# Patient Record
Sex: MALE | Race: WHITE | NOT HISPANIC OR LATINO | Employment: OTHER | URBAN - METROPOLITAN AREA
[De-identification: names, ages, dates, MRNs, and addresses within clinical notes are randomized per-mention and may not be internally consistent; named-entity substitution may affect disease eponyms.]

---

## 2018-01-12 ENCOUNTER — OFFICE VISIT - HEALTHEAST (OUTPATIENT)
Dept: FAMILY MEDICINE | Facility: CLINIC | Age: 63
End: 2018-01-12

## 2018-01-12 DIAGNOSIS — R05.9 COUGH: ICD-10-CM

## 2018-01-12 DIAGNOSIS — R09.82 POSTNASAL DRIP: ICD-10-CM

## 2018-07-30 ENCOUNTER — OFFICE VISIT - HEALTHEAST (OUTPATIENT)
Dept: FAMILY MEDICINE | Facility: CLINIC | Age: 63
End: 2018-07-30

## 2018-07-30 ENCOUNTER — RECORDS - HEALTHEAST (OUTPATIENT)
Dept: GENERAL RADIOLOGY | Facility: CLINIC | Age: 63
End: 2018-07-30

## 2018-07-30 DIAGNOSIS — Z13.6 ENCOUNTER FOR ABDOMINAL AORTIC ANEURYSM (AAA) SCREENING: ICD-10-CM

## 2018-07-30 DIAGNOSIS — E88.09 HYPOALBUMINEMIA: ICD-10-CM

## 2018-07-30 DIAGNOSIS — E66.9 OBESITY, UNSPECIFIED: ICD-10-CM

## 2018-07-30 DIAGNOSIS — E66.9 OBESE: ICD-10-CM

## 2018-07-30 DIAGNOSIS — Z11.4 ENCOUNTER FOR SCREENING FOR HIV: ICD-10-CM

## 2018-07-30 DIAGNOSIS — T14.8XXA BLOOD BLISTER: ICD-10-CM

## 2018-07-30 DIAGNOSIS — T14.8XXA OTHER INJURY OF UNSPECIFIED BODY REGION, INITIAL ENCOUNTER: ICD-10-CM

## 2018-07-30 DIAGNOSIS — R21 RASH: ICD-10-CM

## 2018-07-30 DIAGNOSIS — M25.571 PAIN IN JOINT INVOLVING ANKLE AND FOOT, RIGHT: ICD-10-CM

## 2018-07-30 DIAGNOSIS — Z11.59 ENCOUNTER FOR HEPATITIS C SCREENING TEST FOR LOW RISK PATIENT: ICD-10-CM

## 2018-07-30 LAB
ALBUMIN SERPL-MCNC: 3.2 G/DL (ref 3.5–5)
ALP SERPL-CCNC: 105 U/L (ref 45–120)
ALT SERPL W P-5'-P-CCNC: 17 U/L (ref 0–45)
ANION GAP SERPL CALCULATED.3IONS-SCNC: 12 MMOL/L (ref 5–18)
AST SERPL W P-5'-P-CCNC: 16 U/L (ref 0–40)
BASOPHILS # BLD AUTO: 0.1 THOU/UL (ref 0–0.2)
BASOPHILS NFR BLD AUTO: 1 % (ref 0–2)
BILIRUB SERPL-MCNC: 0.6 MG/DL (ref 0–1)
BUN SERPL-MCNC: 17 MG/DL (ref 8–22)
CALCIUM SERPL-MCNC: 8.7 MG/DL (ref 8.5–10.5)
CHLORIDE BLD-SCNC: 105 MMOL/L (ref 98–107)
CHOLEST SERPL-MCNC: 165 MG/DL
CO2 SERPL-SCNC: 21 MMOL/L (ref 22–31)
CREAT SERPL-MCNC: 0.86 MG/DL (ref 0.7–1.3)
EOSINOPHIL # BLD AUTO: 0.2 THOU/UL (ref 0–0.4)
EOSINOPHIL NFR BLD AUTO: 2 % (ref 0–6)
ERYTHROCYTE [DISTWIDTH] IN BLOOD BY AUTOMATED COUNT: 12.3 % (ref 11–14.5)
FASTING STATUS PATIENT QL REPORTED: NO
GFR SERPL CREATININE-BSD FRML MDRD: >60 ML/MIN/1.73M2
GLUCOSE BLD-MCNC: 117 MG/DL (ref 70–125)
HBA1C MFR BLD: 5.8 % (ref 3.5–6)
HCT VFR BLD AUTO: 42.5 % (ref 40–54)
HDLC SERPL-MCNC: 43 MG/DL
HGB BLD-MCNC: 14.2 G/DL (ref 14–18)
HIV 1+2 AB+HIV1 P24 AG SERPL QL IA: NEGATIVE
LDLC SERPL CALC-MCNC: 94 MG/DL
LYMPHOCYTES # BLD AUTO: 1.6 THOU/UL (ref 0.8–4.4)
LYMPHOCYTES NFR BLD AUTO: 19 % (ref 20–40)
MCH RBC QN AUTO: 29.7 PG (ref 27–34)
MCHC RBC AUTO-ENTMCNC: 33.4 G/DL (ref 32–36)
MCV RBC AUTO: 89 FL (ref 80–100)
MONOCYTES # BLD AUTO: 0.8 THOU/UL (ref 0–0.9)
MONOCYTES NFR BLD AUTO: 9 % (ref 2–10)
NEUTROPHILS # BLD AUTO: 5.6 THOU/UL (ref 2–7.7)
NEUTROPHILS NFR BLD AUTO: 68 % (ref 50–70)
PLATELET # BLD AUTO: 264 THOU/UL (ref 140–440)
PMV BLD AUTO: 6.8 FL (ref 7–10)
POTASSIUM BLD-SCNC: 4 MMOL/L (ref 3.5–5)
PROT SERPL-MCNC: 7.2 G/DL (ref 6–8)
RBC # BLD AUTO: 4.77 MILL/UL (ref 4.4–6.2)
SODIUM SERPL-SCNC: 138 MMOL/L (ref 136–145)
TRIGL SERPL-MCNC: 142 MG/DL
WBC: 8.2 THOU/UL (ref 4–11)

## 2018-07-30 ASSESSMENT — MIFFLIN-ST. JEOR: SCORE: 1641.49

## 2018-07-31 LAB
25(OH)D3 SERPL-MCNC: 25.7 NG/ML (ref 30–80)
25(OH)D3 SERPL-MCNC: 25.7 NG/ML (ref 30–80)
HCV AB SERPL QL IA: NEGATIVE

## 2018-08-03 ENCOUNTER — HOSPITAL ENCOUNTER (OUTPATIENT)
Dept: ULTRASOUND IMAGING | Facility: HOSPITAL | Age: 63
Discharge: HOME | End: 2018-08-03

## 2018-08-03 DIAGNOSIS — Z13.6 ENCOUNTER FOR ABDOMINAL AORTIC ANEURYSM (AAA) SCREENING: ICD-10-CM

## 2018-08-03 DIAGNOSIS — E66.9 OBESE: ICD-10-CM

## 2019-01-01 ENCOUNTER — AMBULATORY - HEALTHEAST (OUTPATIENT)
Dept: MULTI SPECIALTY CLINIC | Facility: CLINIC | Age: 64
End: 2019-01-01

## 2019-01-04 ENCOUNTER — RECORDS - HEALTHEAST (OUTPATIENT)
Dept: ADMINISTRATIVE | Facility: OTHER | Age: 64
End: 2019-01-04

## 2019-06-11 ENCOUNTER — COMMUNICATION - HEALTHEAST (OUTPATIENT)
Dept: FAMILY MEDICINE | Facility: CLINIC | Age: 64
End: 2019-06-11

## 2019-08-09 ENCOUNTER — OFFICE VISIT - HEALTHEAST (OUTPATIENT)
Dept: FAMILY MEDICINE | Facility: CLINIC | Age: 64
End: 2019-08-09

## 2019-08-09 DIAGNOSIS — L72.3 INFLAMED SEBACEOUS CYST: ICD-10-CM

## 2019-08-09 DIAGNOSIS — L73.9 FOLLICULITIS: ICD-10-CM

## 2019-08-09 ASSESSMENT — MIFFLIN-ST. JEOR: SCORE: 1606.62

## 2019-11-11 ENCOUNTER — COMMUNICATION - HEALTHEAST (OUTPATIENT)
Dept: FAMILY MEDICINE | Facility: CLINIC | Age: 64
End: 2019-11-11

## 2019-12-19 ENCOUNTER — AMBULATORY - HEALTHEAST (OUTPATIENT)
Dept: NURSING | Facility: CLINIC | Age: 64
End: 2019-12-19

## 2019-12-19 DIAGNOSIS — Z23 FLU VACCINE NEED: ICD-10-CM

## 2020-01-21 ENCOUNTER — OFFICE VISIT - HEALTHEAST (OUTPATIENT)
Dept: FAMILY MEDICINE | Facility: CLINIC | Age: 65
End: 2020-01-21

## 2020-01-21 DIAGNOSIS — H54.61 DECREASED VISION OF RIGHT EYE: ICD-10-CM

## 2020-01-21 DIAGNOSIS — Z01.818 PREOPERATIVE EXAMINATION: ICD-10-CM

## 2020-01-21 ASSESSMENT — MIFFLIN-ST. JEOR: SCORE: 1619.95

## 2020-02-04 ENCOUNTER — COMMUNICATION - HEALTHEAST (OUTPATIENT)
Dept: FAMILY MEDICINE | Facility: CLINIC | Age: 65
End: 2020-02-04

## 2020-12-02 ENCOUNTER — COMMUNICATION - HEALTHEAST (OUTPATIENT)
Dept: FAMILY MEDICINE | Facility: CLINIC | Age: 65
End: 2020-12-02

## 2020-12-02 ENCOUNTER — AMBULATORY - HEALTHEAST (OUTPATIENT)
Dept: NURSING | Facility: CLINIC | Age: 65
End: 2020-12-02

## 2020-12-02 DIAGNOSIS — Z23 NEED FOR VACCINATION: ICD-10-CM

## 2021-03-15 ENCOUNTER — COMMUNICATION - HEALTHEAST (OUTPATIENT)
Dept: FAMILY MEDICINE | Facility: CLINIC | Age: 66
End: 2021-03-15

## 2021-05-31 VITALS — BODY MASS INDEX: 32.43 KG/M2 | WEIGHT: 204 LBS

## 2021-05-31 NOTE — PROGRESS NOTES
"[facility] Note    Name: Se Quinonez  : 1955   MRN: [id number]    Se Quinonez is a 64 y.o. male presenting to discuss the following:     CC:   Chief Complaint   Patient presents with     Cyst     Upper back, left shoulder blade     Urticaria     Waist and legs       HPI:  CYST - wife noticed 2 weeks ago. Wife has been squeezing it and applying triple antibiotic ointment. Not painful. Initially had some drainage, white cheesy drainage. Now not getting much out, has decreased in size. Was initially a quarter in diameter and 1/4\" tall.     HIVES - Had an outdoor tabling evening at [facility] 1 week ago. Noticed a few bumps later in the day, looked hive-like. Remote history of hives and looked similar. Grass had recently been mowed. Not sure if insect bites. No longer itchy. Las 5-6 days, has been using topical benadryl. Stayed the same, no migration. Seem to be shrinking in the last day or so.     No symptoms of swelling, shortness of breath, lightheadedness or dizziness.     Had colonoscopy in January, diagnosed with mild case of Crohn's, prescribed budesonide, treatment was going to be $2000 annually. Inflammatory bowel disease runs in family. Since then, hasn't had any symptoms or urges. Was told he had hemorrhoids as well. Hasn't followed up with GI.     ROS:   See HPI above.     PMH:   Patient Active Problem List   Diagnosis     Postherpetic Polyneuropathy     Herpes Zoster (Shingles)     Red Blood In Bowel Movement (Hematochezia)     PSH:   Past Surgical History:   Procedure Laterality Date     EYE SURGERY       tonsillectomy       MEDICATIONS:   Current Outpatient Medications on File Prior to Visit   Medication Sig Dispense Refill     [DISCONTINUED] ibuprofen (ADVIL,MOTRIN) 200 MG tablet Take 200 mg by mouth every 6 (six) hours as needed for pain.       No current facility-administered medications on file prior to visit.        ALLERGIES:  No Known Allergies    PHYSICAL EXAM: " "  /74   Pulse 100   Temp 98.8  F (37.1  C) (Oral)   Resp 16   Ht 5' 6.5\" (1.689 m)   Wt 195 lb 5 oz (88.6 kg)   BMI 31.05 kg/m     GENERAL: Se is a pleasant, well appearing male, overweight, in no acute distress.   HEART: Regular rate and rhythm, no murmurs.   LUNGS: Clear to auscultation bilaterally, unlabored.   DERM: Inflamed cyst draining cheesy white, purulent fluid overlying left scapula. Utilized q-tip to help express remaining contents. Cyst flattened, approximately the size of a nickel. On bilateral upper legs, he has non-blanchable dark erythematous papules and pustules    ASSESSMENT & PLAN:   Se Quinonez is a 64 y.o. male presenting today for evaluation of skin concerns.     1. Inflamed sebaceous cyst  - sulfamethoxazole-trimethoprim (BACTRIM DS) 800-160 mg per tablet; Take 1 tablet by mouth 2 (two) times a day for 7 days.  Dispense: 14 tablet; Refill: 0    Cyst currently inflamed, has already been drained by home remedies. Recommended antibiotics to help calm down inflammation and treat secondary bacterial infection. Discussed options of keflex vs clindamycin vs bactrim vs doxycycline, opted to cover with Bactrim.     Recommended allowing cyst to re-accumulate and when bothersome again, then return for excision of cyst sac to prevent recurrence.     2. Folliculitis  Leg legions are not consistent with hives, more consistent with folliculitis vs bug bites. May continue topical and/or oral antihistamines for itching. Anticipate antibiotics per above will treat lesions. If not improving, return for further evaluation and consider possible vasculitis in differential diagnosis.     HM: JAMI signed for colonoscopy report from [facility].     RTC: 1 month - medicare annual wellness visit    Anastasiya Abdul, DO       "

## 2021-06-01 VITALS — WEIGHT: 203 LBS | HEIGHT: 67 IN | BODY MASS INDEX: 31.86 KG/M2

## 2021-06-03 VITALS — BODY MASS INDEX: 30.65 KG/M2 | HEIGHT: 67 IN | WEIGHT: 195.31 LBS

## 2021-06-04 VITALS
DIASTOLIC BLOOD PRESSURE: 78 MMHG | SYSTOLIC BLOOD PRESSURE: 122 MMHG | HEIGHT: 67 IN | HEART RATE: 72 BPM | BODY MASS INDEX: 31.11 KG/M2 | RESPIRATION RATE: 16 BRPM | TEMPERATURE: 97.9 F | WEIGHT: 198.25 LBS

## 2021-06-05 NOTE — PROGRESS NOTES
Preoperative Exam    Scheduled Procedure: R Eye Yag Capsulotomy  Surgery Date:  1/23/20  Surgery Location: [facility], [place], fax [phone]    Surgeon:  Dr. Denton Mcnally    Assessment/Plan:     1. Preoperative examination    2. Decreased vision of right eye     Surgical Procedure Risk: Low (reported cardiac risk generally < 1%)  Have you had prior anesthesia?: Yes  Have you or any family members had a previous anesthesia reaction:  No  Do you or any family members have a history of a clotting or bleeding disorder?: No  Cardiac Risk Assessment: no increased risk for major cardiac complications    APPROVAL GIVEN to proceed with proposed procedure, without further diagnostic evaluation    Functional Status: Independent  Patient plans to recover at home with family.     Subjective:      Se Quinonez is a 64 y.o. male who presents for a preoperative consultation.  He has unfortunately been having significant issues with his right eye.  He will be having a yag capsulotomy procedure done later this week.    He has a past medical history significant for Crohn's disease which is untreated with medication.  He states that he is overall asymptomatic.    All other systems reviewed and are negative, other than those listed in the HPI.    Pertinent History  Do you have difficulty breathing or chest pain after walking up a flight of stairs: No  History of obstructive sleep apnea: No  Steroid use in the last 6 months: No  Frequent Aspirin/NSAID use: No  Prior Blood Transfusion: No  Prior Blood Transfusion Reaction: No  If for some reason prior to, during or after the procedure, if it is medically indicated, would you be willing to have a blood transfusion?:  There is no transfusion refusal.    No current outpatient medications on file.     No current facility-administered medications for this visit.         No Known Allergies    Patient Active Problem List   Diagnosis     Red Blood In Bowel Movement  (Hematochezia)       Past Medical History:   Diagnosis Date     Herpes Zoster (Shingles)     Created by Conversion  Replacement Utility updated for latest IMO load     Postherpetic polyneuropathy     Created by Conversion        Past Surgical History:   Procedure Laterality Date     EYE SURGERY       tonsillectomy         Social History     Socioeconomic History     Marital status:      Spouse name: Not on file     Number of children: Not on file     Years of education: Not on file     Highest education level: Not on file   Occupational History     Not on file   Social Needs     Financial resource strain: Not on file     Food insecurity:     Worry: Not on file     Inability: Not on file     Transportation needs:     Medical: Not on file     Non-medical: Not on file   Tobacco Use     Smoking status: Former Smoker     Last attempt to quit: 11/1/2004     Years since quitting: 15.2     Smokeless tobacco: Never Used   Substance and Sexual Activity     Alcohol use: No     Drug use: No     Sexual activity: Not on file   Lifestyle     Physical activity:     Days per week: Not on file     Minutes per session: Not on file     Stress: Not on file   Relationships     Social connections:     Talks on phone: Not on file     Gets together: Not on file     Attends [employer] service: Not on file     Active member of club or organization: Not on file     Attends meetings of clubs or organizations: Not on file     Relationship status: Not on file     Intimate partner violence:     Fear of current or ex partner: Not on file     Emotionally abused: Not on file     Physically abused: Not on file     Forced sexual activity: Not on file   Other Topics Concern     Not on file   Social History Narrative     Not on file       Patient Care Team:  Mikayla Bill MD as PCP - General (Family Medicine)  Anastasiya Abdul DO as Assigned PCP          Objective:     Vitals:    01/21/20 0953   BP: 122/78   Pulse: 72   Resp: 16   Temp:  "97.9  F (36.6  C)   TempSrc: Oral   Weight: 198 lb 4 oz (89.9 kg)   Height: 5' 6.5\" (1.689 m)         Physical Exam:  Objective:  Vital signs reviewed and stable  General: No acute distress  Psych: Appropriate affect  HEENT: moist mucous membranes, pupils equal, round, reactive to light and accomodation, posterior oropharynx clear of erythema or exudate, tympanic membranes are pearly grey bilaterally  Lymph: no cervical or supraclavicular lymphadenopathy  Cardiovascular: regular rate and rhythm with no murmur  Pulmonary: clear to auscultation bilaterally with no wheeze  Abdomen: soft, non tender, non distended with normo-active bowel sounds  Extremities: warm and well perfused with no edema  Skin: warm and dry with no rash      There are no Patient Instructions on file for this visit.      Labs:  No labs were ordered during this visit    Immunization History   Administered Date(s) Administered     Influenza high dose,seasonal,PF, 65+ yrs 12/19/2019     Influenza, inj, historic,unspecified 12/14/2005, 11/01/2013, 09/11/2015     Pneumo Conj 13-V (2010&after) 07/30/2018     Tdap 07/30/2018           Electronically signed by Mikayla Bill MD 01/21/20 9:55 AM  "

## 2021-06-05 NOTE — TELEPHONE ENCOUNTER
Left message for pt to call back.  Dr. Bill wanted pt to know she received a copy of colonoscopy from 1/2019. Recommendations from [facility] were to repeat colonoscopy in 1 year.  Please give pt phone number for [facility] ([phone]) to call and schedule colonoscopy.  Please document call was returned.

## 2021-06-05 NOTE — TELEPHONE ENCOUNTER
Patient Returning Call  Reason for call:  Return call   Information relayed to patient:  Caller was informed of message below.   Patient has additional questions:  Yes  If YES, what are your questions/concerns:  Caller stated that he already knows and does not need a reminder call. Caller stated that he is not ready at the moment and will call when he is.   Okay to leave a detailed message?: No call back needed

## 2021-06-15 NOTE — PROGRESS NOTES
ASSESSMENT/ PLAN    1. Cough  2. Postnasal drip  62 year old male here for 4-5 day history of cough, cold symptoms, postnasal drip. He's feeling better today but still came in to make sure everything is ok. Vital signs normal. Cardiopulmonary exam normal. HEENT exam normal except mildly erythematous posterior oropharynx. Most likely viral bronchitis, and cough due to ongoing postnasal drip. Will treat symptomatically with flonase for postnasal drip. No antibiotic indication. No lab work or imaging indicated. RTC in a few days if not improved.   - fluticasone (FLONASE) 50 mcg/actuation nasal spray; 1 spray into each nostril daily.  Dispense: 16 g; Refill: 2      Rosangela Morfin MD        SUBJECTIVE   Se Quinonez is a 62 y.o. old male with a past medical history of obesity who presented to clinic today for further evaluation of cough and cold symptoms. Started about 4-5 days ago. Cough is getting worse with chest pain from coughing, pain on the bilateral frontal lower ribs, postnasal drip, dry cough. Associated with fatigue. Reports chills and subjective fever. Headache. Denies dizziness. Reports fatigue. Reports mild sinus pain. Denies ear pain. Reports mild nausea with coughing. Reports mild SOB. Denies chest pain with exertion. Denies stool problem or urinary problem. Nasal congestion and fatigue.     Review of Systems:  A 12 point comprehensive review of systems was negative except as noted in HPI.    The following portions of the patient's history were reviewed and updated as appropriate: past medical history, past surgical history, family history, allergies, current medications and problem list.    Medical History  Active Ambulatory (Non-Hospital) Problems    Diagnosis     Postherpetic Polyneuropathy     Herpes Zoster (Shingles)     Red Blood In Bowel Movement (Hematochezia)     History reviewed. No pertinent past medical history.    Surgical History  He  has a past surgical history that includes  tonsillectomy and Eye surgery.    Social History  Reviewed, and he  reports that he quit smoking about 13 years ago. He has never used smokeless tobacco. He reports that he does not drink alcohol or use illicit drugs.    Family History  Reviewed, and family history includes Alzheimer's disease in his mother; Cancer in his father.    Medications  Reviewed and reconciled.      Allergies  No Known Allergies      OBJECTIVE  Physical Exam:  Vital signs: /86 (Patient Site: Left Arm, Patient Position: Sitting, Cuff Size: Adult Regular)  Pulse (!) 104  Temp 98.5  F (36.9  C) (Oral)   Resp 20  Wt 204 lb (92.5 kg)  SpO2 94%  BMI 32.43 kg/m2  Weight: 204 lb (92.5 kg)    General appearance: pleasant, appears stated age, cooperative and in no distress  Eyes: EOMs intact, PERRL, conjunctivae normal.   ENT: moist oral mucosa, posterior oropharynx mildly, TMs normal. No sinus tenderness on palpation.   Lymph: no cervical/supraclavicular adenopathy  Respiratory: clear to auscultation bilaterally, good air movement throughout, no wheezing or crackles, speaking full sentences without difficulty  Cardiovascular: regular rate and rhythm, no murmur appreciated, no leg edema  Skin: no rashes  Neuro: alert oriented x 3, grossly normal otherwise  Psych: normal affect, appropriate conversation

## 2021-06-19 NOTE — LETTER
Letter by Mikayla Bill MD at      Author: Mikayla Bill MD Service: -- Author Type: --    Filed:  Encounter Date: 6/11/2019 Status: (Other)       Se Quinonez  [address] [facility] [address]             June 11, 2019        Dear Se Quinonez :    Dr. Bill was reviewing your chart and noticed that you are due for a colonoscopy. Your last discussion pertaining to a colonoscopy was 7/30/2018 with Dr. Maikol Loving.    To prevent delays in your care, please call [facility] [phone] to schedule a screening colonoscopy.    If you had a colonoscopy performed within the last 10 years at a different facility please contact the [facility] at [phone]   so we can get the report.    Sincerely,  Your care team at [facility]

## 2021-06-19 NOTE — PROGRESS NOTES
"[facility] Note    Patient Name: Se Quinonez  Patient Age: 63 y.o.  YOB: 1955  MRN: [id number]    Date of visit: 7/30/2018    Patient Active Problem List   Diagnosis     Postherpetic Polyneuropathy     Herpes Zoster (Shingles)     Red Blood In Bowel Movement (Hematochezia)     Social History     Social History Narrative     Family History   Problem Relation Age of Onset     Alzheimer's disease Mother      Cancer Father      blood cancer     Outpatient Encounter Prescriptions as of 7/30/2018   Medication Sig Dispense Refill     ibuprofen (ADVIL,MOTRIN) 200 MG tablet Take 200 mg by mouth every 6 (six) hours as needed for pain.       terbinafine HCl (LAMISIL) 1 % cream Apply topically 2 (two) times a day for 14 days. 30 g 0     triamcinolone (KENALOG) 0.5 % ointment Apply topically 2 (two) times a day for 14 days. If using longer than 2 weeks, only use 5 days/week. 15 g 0     [DISCONTINUED] fluticasone (FLONASE) 50 mcg/actuation nasal spray 1 spray into each nostril daily. 16 g 2     No facility-administered encounter medications on file as of 7/30/2018.        Chief Complaint:   Chief Complaint   Patient presents with     Rash     x 1 month. Itchy       /74  Pulse 96  Temp 98.3  F (36.8  C) (Oral)   Resp 20  Ht 5' 6.5\" (1.689 m)  Wt 203 lb (92.1 kg)  BMI 32.27 kg/m2  HPI:   Patient has had a rectangular rash on the right medial ankle for the last month or so, it is increased somewhat in size, it is pruritic.  He has not been using any medication on it, no new medications, no tape has been used on it.  He has had swelling of that ankle for the last year and some pain when he is going down steps over that time no distal symptoms.  He did notice a hemorrhagic blister on his fourth toe over the last week.    He has not been in for a wellness visit in a very long time, he is interested in having some preventative things done here today.  He had a sigmoidoscopy about 30 years ago.  He " "did smoke until 2004.    ROS: Pertinent ros findings in hpi, all other systems negative.    Objective/Physical Exam:     /74  Pulse 96  Temp 98.3  F (36.8  C) (Oral)   Resp 20  Ht 5' 6.5\" (1.689 m)  Wt 203 lb (92.1 kg)  BMI 32.27 kg/m2    Gen: NAD, appears age  Skin: warm, dry  HENT: normocephalic atraumatic, MMM  Eyes: non-icteric, no proptosis  CV: NRRR no m/r/g, no peripheral edema  Resp: CTAB no w/r/r, normal respiratory effort  Abd: non-distended, soft  Hematologic: No petechiae or purpura  MSK: no muscle or joint swelling  Neuro: no dysarthria or gross asymmetry  Psych: Cooperative, full affect    Right medial ankle there is a 1 x 3 cm patch with some crusting over it which is patchy.  There is no surrounding erythema, no induration.  There is very mild swelling of the ankle on the right, dorsalis pedis 1+ no distal cyanosis or pallor.  There is a hemorrhagic blister on the plantar surface of the fourth toe which has started to become a callus.  There is no pain or restriction with range of motion of the right ankle, 5 out of 5 strength of the ankle and great toe.  Absolutely no swelling of the right calf.    Assessment/Plan:  No results found for this or any previous visit (from the past 24 hour(s)).      ICD-10-CM    1. Rash R21 Ambulatory referral to Dermatology   2. Pain in joint involving ankle and foot, right M25.571    3. Blood blister T14.8XXA XR Ankle Right 3 or More VWS   4. Obese E66.9 Lipid Cascade     Glycosylated Hemoglobin A1c     Comprehensive Metabolic Panel     Vitamin D, Total (25-Hydroxy)     HIV Antigen/Antibody Screening [place]     Hepatitis C Antibody (Anti-HCV)     Ambulatory referral for Colonoscopy     US Abdominal Aorta     XR Ankle Right 3 or More VWS   5. Encounter for abdominal aortic aneurysm (AAA) screening Z13.6 US Abdominal Aorta   6. Encounter for hepatitis C screening test for low risk patient Z11.59 Hepatitis C Antibody (Anti-HCV)   7. Encounter for screening " for HIV Z11.4 HIV Antigen/Antibody Screening [place]       I am certain uncertain what the cause of this rash is on the right medial ankle, it may be fungal versus contact dermatitis versus other.  Given that it is worsening, I am recommend that he see a dermatologist, it may need a biopsy.  In the meantime, I do recommend trying topical antifungal for 4 weeks and topical steroid for 1-2 weeks initially.    We are doing a Prevnar and his Tdap today, he wanted to wait on his shin Grix.  Blood tests as above, AAA screen and colonoscopy risks benefits discussed.  I did advise him that he should schedule a dedicated preventative visit in the next couple months.  We talked extensively about diet and exercise.    He is not having pain in his right ankle except when he is going downstairs for the most part, we are x-raying his ankle, we did discuss that weight loss would probably help with this.  We will see what x-ray shows.    Patient Instructions   Plant Based Diet Handout    Eat abundant vegetables.    Only eat whole grains.    2-4 fruits/day.    Enjoy at least 2 servings of legumes (beans) or tofu daily.      Avoid animal products such as dairy, eggs and meat. (Replace these with 1,000mcg vitamin B12 and a calcium/vitamin D supplement such as Caltrate+D3 daily.)    Avoid processed foods, sugars and oils.    Cook your own food as much as possible.    Keep a food diary and ask someone else to diet with you.    Drink 8 glasses of water a day.    [employer] Documentary - watch online.        Counseled patient regarding treatments, treatment options, risks and benefits and diagnosis.  The patient was interactive, attentive, verbalized understanding, and we discussed plan.     Maikol Loving MD

## 2021-06-26 ENCOUNTER — HEALTH MAINTENANCE LETTER (OUTPATIENT)
Age: 66
End: 2021-06-26

## 2021-07-03 NOTE — ADDENDUM NOTE
Addendum Note by Suni Loving MD at 7/31/2018 10:22 AM     Author: Suni Loving MD Service: -- Author Type: Physician    Filed: 7/31/2018 10:22 AM Encounter Date: 7/30/2018 Status: Signed    : Suni Loving MD (Physician)    Addended by: SUNI LOVING on: 7/31/2018 10:22 AM        Modules accepted: Orders

## 2021-10-16 ENCOUNTER — HEALTH MAINTENANCE LETTER (OUTPATIENT)
Age: 66
End: 2021-10-16

## 2021-12-13 ENCOUNTER — IMMUNIZATION (OUTPATIENT)
Dept: FAMILY MEDICINE | Facility: CLINIC | Age: 66
End: 2021-12-13
Payer: MEDICARE

## 2021-12-13 PROCEDURE — 91306 COVID-19,PF,MODERNA (18+ YRS BOOSTER .25ML): CPT

## 2021-12-13 PROCEDURE — 0064A COVID-19,PF,MODERNA (18+ YRS BOOSTER .25ML): CPT

## 2022-07-08 ENCOUNTER — OFFICE VISIT (OUTPATIENT)
Dept: FAMILY MEDICINE | Facility: CLINIC | Age: 67
End: 2022-07-08
Payer: MEDICARE

## 2022-07-08 VITALS
RESPIRATION RATE: 16 BRPM | OXYGEN SATURATION: 97 % | DIASTOLIC BLOOD PRESSURE: 78 MMHG | TEMPERATURE: 97.8 F | SYSTOLIC BLOOD PRESSURE: 130 MMHG | HEART RATE: 80 BPM | BODY MASS INDEX: 31.24 KG/M2 | HEIGHT: 66 IN | WEIGHT: 194.38 LBS

## 2022-07-08 DIAGNOSIS — Z13.1 SCREENING FOR DIABETES MELLITUS: ICD-10-CM

## 2022-07-08 DIAGNOSIS — Z12.5 SCREENING FOR PROSTATE CANCER: ICD-10-CM

## 2022-07-08 DIAGNOSIS — Z00.00 ENCOUNTER FOR MEDICARE ANNUAL WELLNESS EXAM: Primary | ICD-10-CM

## 2022-07-08 DIAGNOSIS — Z13.220 LIPID SCREENING: ICD-10-CM

## 2022-07-08 DIAGNOSIS — H91.93 HEARING DEFICIT, BILATERAL: ICD-10-CM

## 2022-07-08 DIAGNOSIS — R97.20 ELEVATED PROSTATE SPECIFIC ANTIGEN (PSA): ICD-10-CM

## 2022-07-08 DIAGNOSIS — R73.09 OTHER ABNORMAL GLUCOSE: ICD-10-CM

## 2022-07-08 DIAGNOSIS — K50.919 CROHN'S DISEASE WITH COMPLICATION, UNSPECIFIED GASTROINTESTINAL TRACT LOCATION (H): ICD-10-CM

## 2022-07-08 LAB
ALBUMIN SERPL-MCNC: 3.2 G/DL (ref 3.4–5)
ALP SERPL-CCNC: 101 U/L (ref 40–150)
ALT SERPL W P-5'-P-CCNC: 24 U/L (ref 0–70)
ANION GAP SERPL CALCULATED.3IONS-SCNC: 5 MMOL/L (ref 3–14)
AST SERPL W P-5'-P-CCNC: 21 U/L (ref 0–45)
BILIRUB SERPL-MCNC: 0.9 MG/DL (ref 0.2–1.3)
BUN SERPL-MCNC: 15 MG/DL (ref 7–30)
CALCIUM SERPL-MCNC: 8.3 MG/DL (ref 8.5–10.1)
CHLORIDE BLD-SCNC: 108 MMOL/L (ref 94–109)
CHOLEST SERPL-MCNC: 149 MG/DL
CO2 SERPL-SCNC: 25 MMOL/L (ref 20–32)
CREAT SERPL-MCNC: 0.84 MG/DL (ref 0.66–1.25)
FASTING STATUS PATIENT QL REPORTED: NORMAL
GFR SERPL CREATININE-BSD FRML MDRD: >90 ML/MIN/1.73M2
GLUCOSE BLD-MCNC: 90 MG/DL (ref 70–99)
HBA1C MFR BLD: 5.3 % (ref 0–5.6)
HDLC SERPL-MCNC: 43 MG/DL
LDLC SERPL CALC-MCNC: 85 MG/DL
NONHDLC SERPL-MCNC: 106 MG/DL
POTASSIUM BLD-SCNC: 4.5 MMOL/L (ref 3.4–5.3)
PROT SERPL-MCNC: 7.5 G/DL (ref 6.8–8.8)
PSA SERPL-MCNC: 28.3 UG/L (ref 0–4)
SODIUM SERPL-SCNC: 138 MMOL/L (ref 133–144)
TRIGL SERPL-MCNC: 106 MG/DL

## 2022-07-08 PROCEDURE — 36415 COLL VENOUS BLD VENIPUNCTURE: CPT | Performed by: FAMILY MEDICINE

## 2022-07-08 PROCEDURE — 83036 HEMOGLOBIN GLYCOSYLATED A1C: CPT | Performed by: FAMILY MEDICINE

## 2022-07-08 PROCEDURE — G0439 PPPS, SUBSEQ VISIT: HCPCS | Performed by: FAMILY MEDICINE

## 2022-07-08 PROCEDURE — 80061 LIPID PANEL: CPT | Performed by: FAMILY MEDICINE

## 2022-07-08 PROCEDURE — 80053 COMPREHEN METABOLIC PANEL: CPT | Performed by: FAMILY MEDICINE

## 2022-07-08 PROCEDURE — G0103 PSA SCREENING: HCPCS | Performed by: FAMILY MEDICINE

## 2022-07-08 ASSESSMENT — ENCOUNTER SYMPTOMS
EYE PAIN: 0
HEARTBURN: 1
SORE THROAT: 0
SHORTNESS OF BREATH: 0
PALPITATIONS: 0
HEADACHES: 0
PARESTHESIAS: 0
CONSTIPATION: 0
WEAKNESS: 0
HEMATURIA: 0
DIZZINESS: 0
DYSURIA: 0
DIARRHEA: 0
HEMATOCHEZIA: 0
MYALGIAS: 0
ABDOMINAL PAIN: 0
FEVER: 0
JOINT SWELLING: 0
COUGH: 0
NERVOUS/ANXIOUS: 0
FREQUENCY: 1
ARTHRALGIAS: 1
CHILLS: 0

## 2022-07-08 ASSESSMENT — ACTIVITIES OF DAILY LIVING (ADL): CURRENT_FUNCTION: NO ASSISTANCE NEEDED

## 2022-07-08 NOTE — PATIENT INSTRUCTIONS
Patient Education   Personalized Prevention Plan  You are due for the preventive services outlined below.  Your care team is available to assist you in scheduling these services.  If you have already completed any of these items, please share that information with your care team to update in your medical record.  Health Maintenance Due   Topic Date Due     ANNUAL REVIEW OF HM ORDERS  Never done     Discuss Advance Care Planning  Never done     Zoster (Shingles) Vaccine (1 of 2) Never done     COVID-19 Vaccine (4 - Booster for Moderna series) 04/13/2022

## 2022-07-08 NOTE — PROGRESS NOTES
"SUBJECTIVE:   Se Quinonez is a 67 year old male who presents for Preventive Visit.    Patient has been advised of split billing requirements and indicates understanding: Yes  Are you in the first 12 months of your Medicare coverage?  No    Healthy Habits:     In general, how would you rate your overall health?  Good    Frequency of exercise:  4-5 days/week    Duration of exercise:  15-30 minutes    Do you usually eat at least 4 servings of fruit and vegetables a day, include whole grains    & fiber and avoid regularly eating high fat or \"junk\" foods?  No    Taking medications regularly:  Yes    Medication side effects:  None    Ability to successfully perform activities of daily living:  No assistance needed    Home Safety:  Lack of grab bars in the bathroom    Hearing Impairment:  Difficult to understand a speaker at a public meeting or [employer] service, need to ask people to speak up or repeat themselves, find that men's voices are easier to understand than woman's, difficulty understanding soft or whispered speech and difficulty understanding speech on the telephone    In the past 6 months, have you been bothered by leaking of urine? Yes    In general, how would you rate your overall mental or emotional health?  Good      PHQ-2 Total Score: 0    Additional concerns today:  No    Do you feel safe in your environment? Yes    Have you ever done Advance Care Planning? (For example, a Health Directive, POLST, or a discussion with a medical provider or your loved ones about your wishes): Not on file with the clinic    Fall risk  Fallen 2 or more times in the past year?: No  Any fall with injury in the past year?: No    Cognitive Screening   1) Repeat 3 items (Leader, Season, Table)    2) Clock draw: NORMAL  3) 3 item recall: Recalls 2 objects   Results: NORMAL clock, 1-2 items recalled: COGNITIVE IMPAIRMENT LESS LIKELY    Mini-CogTM Copyright FERNANDO Guevara. Licensed by the author for use in [facility]; " reprinted with permission (soob@.[facility]). All rights reserved.      Do you have sleep apnea, excessive snoring or daytime drowsiness?: no    Reviewed and updated as needed this visit by clinical staff   Tobacco  Allergies  Meds                Reviewed and updated as needed this visit by Provider                   Social History     Tobacco Use     Smoking status: Former Smoker     Quit date: 2004     Years since quittin.7     Smokeless tobacco: Never Used   Substance Use Topics     Alcohol use: No     If you drink alcohol do you typically have >3 drinks per day or >7 drinks per week? No    Alcohol Use 2022   Prescreen: >3 drinks/day or >7 drinks/week? Not Applicable     Patient notes that he gets up several times at night to urinate.  He does drink 2 cups of coffee before bed.  He has not tried decreasing his fluid intake.  He does not have issues urinating a full bladder or issues starting or stopping stream.  There is a family history of prostate cancer.  Patient has never had a physical and thus never had PSA testing done.  He has not had issues with urination throughout the day.      PROBLEMS TO ADD ON...    Current providers sharing in care for this patient include:   Patient Care Team:  Mikayla Bill MD as PCP - General (Family Practice)  Mikayla Bill MD as Assigned PCP    The following health maintenance items are reviewed in Epic and correct as of today:  Health Maintenance Due   Topic Date Due     ADVANCE CARE PLANNING  Never done     ZOSTER IMMUNIZATION (1 of 2) Never done     COVID-19 Vaccine (4 - Booster for Moderna series) 2022     Labs reviewed in EPIC    Review of Systems   Constitutional: Negative for chills and fever.   HENT: Positive for hearing loss. Negative for congestion, ear pain and sore throat.    Eyes: Negative for pain and visual disturbance.   Respiratory: Negative for cough and shortness of breath.    Cardiovascular: Negative for chest pain,  "palpitations and peripheral edema.   Gastrointestinal: Positive for heartburn. Negative for abdominal pain, constipation, diarrhea and hematochezia.   Genitourinary: Positive for frequency, impotence and urgency. Negative for dysuria, genital sores, hematuria and penile discharge.   Musculoskeletal: Positive for arthralgias. Negative for joint swelling and myalgias.   Skin: Negative for rash.   Neurological: Negative for dizziness, weakness, headaches and paresthesias.   Psychiatric/Behavioral: Negative for mood changes. The patient is not nervous/anxious.          OBJECTIVE:   /78   Pulse 80   Temp 97.8  F (36.6  C) (Tympanic)   Resp 16   Ht 1.676 m (5' 6\")   Wt 88.2 kg (194 lb 6 oz)   SpO2 97%   BMI 31.37 kg/m   Estimated body mass index is 31.37 kg/m  as calculated from the following:    Height as of this encounter: 1.676 m (5' 6\").    Weight as of this encounter: 88.2 kg (194 lb 6 oz).  Physical Exam  GENERAL: healthy, alert and no distress  EYES: Eyes grossly normal to inspection, PERRL and conjunctivae and sclerae normal  HENT: ear canals and TM's normal, nose and mouth without ulcers or lesions  NECK: no adenopathy, no asymmetry, masses, or scars and thyroid normal to palpation  RESP: lungs clear to auscultation - no rales, rhonchi or wheezes  CV: regular rate and rhythm, normal S1 S2, no S3 or S4, no murmur, click or rub, no peripheral edema and peripheral pulses strong  ABDOMEN: soft, nontender, no hepatosplenomegaly, no masses and bowel sounds normal  MS: no gross musculoskeletal defects noted, no edema  SKIN: no suspicious lesions or rashes  NEURO: Normal strength and tone, mentation intact and speech normal  PSYCH: mentation appears normal, affect normal/bright    Diagnostic Test Results:  Labs reviewed in Epic    ASSESSMENT / PLAN:   1. Encounter for Medicare annual wellness exam  - REVIEW OF HEALTH MAINTENANCE PROTOCOL ORDERS    2. Crohn's disease with complication, unspecified " "gastrointestinal tract location (H)  Labs below.  Not really having any issues currently.  Could consider following up with GI.  - Comprehensive metabolic panel (BMP + Alb, Alk Phos, ALT, AST, Total. Bili, TP); Future  - Hemoglobin A1c; Future  - Comprehensive metabolic panel (BMP + Alb, Alk Phos, ALT, AST, Total. Bili, TP)  - Hemoglobin A1c    3. Lipid screening  - Lipid panel reflex to direct LDL Non-fasting; Future  - Lipid panel reflex to direct LDL Non-fasting    4. Screening for prostate cancer  - PROSTATE SPEC ANTIGEN SCREEN; Future  - PROSTATE SPEC ANTIGEN SCREEN    5. Screening for diabetes mellitus  - Hemoglobin A1c; Future  - Hemoglobin A1c    6. Other abnormal glucose   - Hemoglobin A1c; Future  - Hemoglobin A1c    7. Hearing deficit, bilateral  - Adult Audiology  Referral; Future    8. Elevated prostate specific antigen (PSA)  PSA returned elevated.  Referral to urology.  - Adult Urology Referral; Future      Patient has been advised of split billing requirements and indicates understanding: Yes    COUNSELING:  Reviewed preventive health counseling, as reflected in patient instructions    Estimated body mass index is 31.37 kg/m  as calculated from the following:    Height as of this encounter: 1.676 m (5' 6\").    Weight as of this encounter: 88.2 kg (194 lb 6 oz).    Weight management plan: Discussed healthy diet and exercise guidelines    He reports that he quit smoking about 17 years ago. He has never used smokeless tobacco.      Appropriate preventive services were discussed with this patient, including applicable screening as appropriate for cardiovascular disease, diabetes, osteopenia/osteoporosis, and glaucoma.  As appropriate for age/gender, discussed screening for colorectal cancer, prostate cancer, breast cancer, and cervical cancer. Checklist reviewing preventive services available has been given to the patient.    Reviewed patients plan of care and provided an AVS. The Basic Care " Plan (routine screening as documented in Health Maintenance) for Se meets the Care Plan requirement. This Care Plan has been established and reviewed with the Patient.    Counseling Resources:  ATP IV Guidelines  Pooled Cohorts Equation Calculator  Breast Cancer Risk Calculator  Breast Cancer: Medication to Reduce Risk  FRAX Risk Assessment  ICSI Preventive Guidelines  Dietary Guidelines for Americans, 2010  [employer] MyPlate  ASA Prophylaxis  Lung CA Screening    Mikayla Bill MD  [facility]    Identified Health Risks:

## 2022-07-09 ENCOUNTER — MYC MEDICAL ADVICE (OUTPATIENT)
Dept: FAMILY MEDICINE | Facility: CLINIC | Age: 67
End: 2022-07-09

## 2022-07-28 ENCOUNTER — TRANSFERRED RECORDS (OUTPATIENT)
Dept: HEALTH INFORMATION MANAGEMENT | Facility: CLINIC | Age: 67
End: 2022-07-28

## 2022-08-17 ENCOUNTER — TRANSFERRED RECORDS (OUTPATIENT)
Dept: HEALTH INFORMATION MANAGEMENT | Facility: CLINIC | Age: 67
End: 2022-08-17

## 2022-08-29 ENCOUNTER — TRANSFERRED RECORDS (OUTPATIENT)
Dept: FAMILY MEDICINE | Facility: CLINIC | Age: 67
End: 2022-08-29

## 2022-09-02 ENCOUNTER — TRANSFERRED RECORDS (OUTPATIENT)
Dept: HEALTH INFORMATION MANAGEMENT | Facility: CLINIC | Age: 67
End: 2022-09-02

## 2022-09-15 ENCOUNTER — TRANSFERRED RECORDS (OUTPATIENT)
Dept: HEALTH INFORMATION MANAGEMENT | Facility: CLINIC | Age: 67
End: 2022-09-15

## 2022-09-19 ENCOUNTER — TRANSFERRED RECORDS (OUTPATIENT)
Dept: HEALTH INFORMATION MANAGEMENT | Facility: CLINIC | Age: 67
End: 2022-09-19

## 2022-09-25 ENCOUNTER — HEALTH MAINTENANCE LETTER (OUTPATIENT)
Age: 67
End: 2022-09-25

## 2022-10-07 LAB
ALT SERPL-CCNC: 21 U/L (ref 7–40)
AST SERPL-CCNC: 22 U/L (ref 13–40)
CREATININE (EXTERNAL): 1.01 MG/DL (ref 0.5–1.2)
GLUCOSE (EXTERNAL): 203 MG/DL (ref 73–126)
POTASSIUM (EXTERNAL): 4.6 MMOL/L (ref 3.5–5.1)

## 2022-10-11 ENCOUNTER — E-VISIT (OUTPATIENT)
Dept: FAMILY MEDICINE | Facility: CLINIC | Age: 67
End: 2022-10-11
Payer: MEDICARE

## 2022-10-11 DIAGNOSIS — K59.00 CONSTIPATION, UNSPECIFIED CONSTIPATION TYPE: Primary | ICD-10-CM

## 2022-10-11 PROCEDURE — 99207 PR NON-BILLABLE SERV PER CHARTING: CPT | Performed by: FAMILY MEDICINE

## 2022-10-12 ENCOUNTER — E-VISIT (OUTPATIENT)
Dept: FAMILY MEDICINE | Facility: CLINIC | Age: 67
End: 2022-10-12
Payer: MEDICARE

## 2022-10-12 ENCOUNTER — MYC MEDICAL ADVICE (OUTPATIENT)
Dept: FAMILY MEDICINE | Facility: CLINIC | Age: 67
End: 2022-10-12

## 2022-10-12 ENCOUNTER — TRANSFERRED RECORDS (OUTPATIENT)
Dept: HEALTH INFORMATION MANAGEMENT | Facility: CLINIC | Age: 67
End: 2022-10-12

## 2022-10-12 DIAGNOSIS — Z71.85 IMMUNIZATION COUNSELING: Primary | ICD-10-CM

## 2022-10-12 PROCEDURE — 99207 PR NON-BILLABLE SERV PER CHARTING: CPT | Performed by: FAMILY MEDICINE

## 2022-10-19 ENCOUNTER — TRANSFERRED RECORDS (OUTPATIENT)
Dept: HEALTH INFORMATION MANAGEMENT | Facility: CLINIC | Age: 67
End: 2022-10-19

## 2022-10-28 ENCOUNTER — TRANSFERRED RECORDS (OUTPATIENT)
Dept: HEALTH INFORMATION MANAGEMENT | Facility: CLINIC | Age: 67
End: 2022-10-28

## 2022-11-18 ENCOUNTER — TRANSFERRED RECORDS (OUTPATIENT)
Dept: HEALTH INFORMATION MANAGEMENT | Facility: CLINIC | Age: 67
End: 2022-11-18

## 2022-12-09 ENCOUNTER — TRANSFERRED RECORDS (OUTPATIENT)
Dept: HEALTH INFORMATION MANAGEMENT | Facility: CLINIC | Age: 67
End: 2022-12-09

## 2022-12-23 ENCOUNTER — HOSPITAL ENCOUNTER (OUTPATIENT)
Dept: CT IMAGING | Facility: HOSPITAL | Age: 67
Discharge: HOME | End: 2022-12-23
Attending: INTERNAL MEDICINE | Admitting: INTERNAL MEDICINE
Payer: MEDICARE

## 2022-12-23 DIAGNOSIS — C61 PRIMARY MALIGNANT NEOPLASM OF PROSTATE (H): ICD-10-CM

## 2022-12-23 DIAGNOSIS — R06.02 SOB (SHORTNESS OF BREATH) ON EXERTION: ICD-10-CM

## 2022-12-23 PROCEDURE — 250N000011 HC RX IP 250 OP 636: Performed by: INTERNAL MEDICINE

## 2022-12-23 PROCEDURE — 71275 CT ANGIOGRAPHY CHEST: CPT

## 2022-12-23 RX ORDER — IOPAMIDOL 755 MG/ML
100 INJECTION, SOLUTION INTRAVASCULAR ONCE
Status: DISCONTINUED | OUTPATIENT
Start: 2022-12-23 | End: 2022-12-23

## 2022-12-23 RX ORDER — IOPAMIDOL 755 MG/ML
80 INJECTION, SOLUTION INTRAVASCULAR ONCE
Status: COMPLETED | OUTPATIENT
Start: 2022-12-23 | End: 2022-12-23

## 2022-12-23 RX ADMIN — IOPAMIDOL 80 ML: 755 INJECTION, SOLUTION INTRAVENOUS at 09:19

## 2022-12-30 ENCOUNTER — TRANSFERRED RECORDS (OUTPATIENT)
Dept: HEALTH INFORMATION MANAGEMENT | Facility: CLINIC | Age: 67
End: 2022-12-30

## 2023-01-05 ENCOUNTER — HOSPITAL ENCOUNTER (OUTPATIENT)
Dept: CARDIOLOGY | Facility: HOSPITAL | Age: 68
Discharge: HOME | End: 2023-01-05
Attending: NURSE PRACTITIONER | Admitting: NURSE PRACTITIONER
Payer: MEDICARE

## 2023-01-05 DIAGNOSIS — Z01.818 EXAMINATION PRIOR TO CHEMOTHERAPY: ICD-10-CM

## 2023-01-05 LAB — LVEF ECHO: NORMAL

## 2023-01-05 PROCEDURE — 93306 TTE W/DOPPLER COMPLETE: CPT | Mod: 26 | Performed by: INTERNAL MEDICINE

## 2023-01-05 PROCEDURE — 93306 TTE W/DOPPLER COMPLETE: CPT

## 2023-01-20 ENCOUNTER — TRANSFERRED RECORDS (OUTPATIENT)
Dept: HEALTH INFORMATION MANAGEMENT | Facility: CLINIC | Age: 68
End: 2023-01-20

## 2023-02-13 ENCOUNTER — TRANSFERRED RECORDS (OUTPATIENT)
Dept: HEALTH INFORMATION MANAGEMENT | Facility: CLINIC | Age: 68
End: 2023-02-13

## 2023-02-17 ENCOUNTER — TRANSFERRED RECORDS (OUTPATIENT)
Dept: HEALTH INFORMATION MANAGEMENT | Facility: CLINIC | Age: 68
End: 2023-02-17

## 2023-02-21 ENCOUNTER — TRANSFERRED RECORDS (OUTPATIENT)
Dept: HEALTH INFORMATION MANAGEMENT | Facility: CLINIC | Age: 68
End: 2023-02-21

## 2023-03-21 ENCOUNTER — TRANSFERRED RECORDS (OUTPATIENT)
Dept: HEALTH INFORMATION MANAGEMENT | Facility: CLINIC | Age: 68
End: 2023-03-21

## 2023-03-28 ENCOUNTER — TRANSFERRED RECORDS (OUTPATIENT)
Dept: HEALTH INFORMATION MANAGEMENT | Facility: CLINIC | Age: 68
End: 2023-03-28

## 2023-04-05 ENCOUNTER — MYC MEDICAL ADVICE (OUTPATIENT)
Dept: FAMILY MEDICINE | Facility: CLINIC | Age: 68
End: 2023-04-05

## 2023-04-05 ENCOUNTER — OFFICE VISIT (OUTPATIENT)
Dept: FAMILY MEDICINE | Facility: CLINIC | Age: 68
End: 2023-04-05
Payer: MEDICARE

## 2023-04-05 ENCOUNTER — TELEPHONE (OUTPATIENT)
Dept: FAMILY MEDICINE | Facility: CLINIC | Age: 68
End: 2023-04-05

## 2023-04-05 VITALS
SYSTOLIC BLOOD PRESSURE: 132 MMHG | HEIGHT: 66 IN | BODY MASS INDEX: 29.41 KG/M2 | DIASTOLIC BLOOD PRESSURE: 82 MMHG | TEMPERATURE: 97.6 F | RESPIRATION RATE: 16 BRPM | HEART RATE: 96 BPM | WEIGHT: 183 LBS | OXYGEN SATURATION: 98 %

## 2023-04-05 DIAGNOSIS — R53.83 FATIGUE, UNSPECIFIED TYPE: ICD-10-CM

## 2023-04-05 DIAGNOSIS — K50.919 CROHN'S DISEASE WITH COMPLICATION, UNSPECIFIED GASTROINTESTINAL TRACT LOCATION (H): ICD-10-CM

## 2023-04-05 DIAGNOSIS — M25.50 ARTHRALGIA OF MULTIPLE JOINTS: Primary | ICD-10-CM

## 2023-04-05 DIAGNOSIS — C61 MALIGNANT TUMOR OF PROSTATE (H): ICD-10-CM

## 2023-04-05 DIAGNOSIS — M25.50 ARTHRALGIA OF MULTIPLE JOINTS: ICD-10-CM

## 2023-04-05 LAB
BASOPHILS # BLD AUTO: 0 10E3/UL (ref 0–0.2)
BASOPHILS NFR BLD AUTO: 0 %
CRP SERPL-MCNC: 10.34 MG/L
EOSINOPHIL # BLD AUTO: 0.1 10E3/UL (ref 0–0.7)
EOSINOPHIL NFR BLD AUTO: 1 %
ERYTHROCYTE [DISTWIDTH] IN BLOOD BY AUTOMATED COUNT: 13.8 % (ref 10–15)
ERYTHROCYTE [SEDIMENTATION RATE] IN BLOOD BY WESTERGREN METHOD: 38 MM/HR (ref 0–20)
HCT VFR BLD AUTO: 42.3 % (ref 40–53)
HGB BLD-MCNC: 13.4 G/DL (ref 13.3–17.7)
LYMPHOCYTES # BLD AUTO: 0.2 10E3/UL (ref 0.8–5.3)
LYMPHOCYTES NFR BLD AUTO: 5 %
MCH RBC QN AUTO: 29.3 PG (ref 26.5–33)
MCHC RBC AUTO-ENTMCNC: 31.7 G/DL (ref 31.5–36.5)
MCV RBC AUTO: 93 FL (ref 78–100)
MONOCYTES # BLD AUTO: 0.4 10E3/UL (ref 0–1.3)
MONOCYTES NFR BLD AUTO: 8 %
NEUTROPHILS # BLD AUTO: 3.9 10E3/UL (ref 1.6–8.3)
NEUTROPHILS NFR BLD AUTO: 86 %
PLATELET # BLD AUTO: 219 10E3/UL (ref 150–450)
RBC # BLD AUTO: 4.57 10E6/UL (ref 4.4–5.9)
TSH SERPL DL<=0.005 MIU/L-ACNC: 2 UIU/ML (ref 0.3–4.2)
URATE SERPL-MCNC: 3.1 MG/DL (ref 3.4–7)
WBC # BLD AUTO: 4.5 10E3/UL (ref 4–11)

## 2023-04-05 PROCEDURE — 99214 OFFICE O/P EST MOD 30 MIN: CPT | Performed by: NURSE PRACTITIONER

## 2023-04-05 PROCEDURE — 36415 COLL VENOUS BLD VENIPUNCTURE: CPT | Performed by: NURSE PRACTITIONER

## 2023-04-05 PROCEDURE — 86618 LYME DISEASE ANTIBODY: CPT | Performed by: NURSE PRACTITIONER

## 2023-04-05 PROCEDURE — 85652 RBC SED RATE AUTOMATED: CPT | Performed by: NURSE PRACTITIONER

## 2023-04-05 PROCEDURE — 84443 ASSAY THYROID STIM HORMONE: CPT | Performed by: NURSE PRACTITIONER

## 2023-04-05 PROCEDURE — 85025 COMPLETE CBC W/AUTO DIFF WBC: CPT | Performed by: NURSE PRACTITIONER

## 2023-04-05 PROCEDURE — 84550 ASSAY OF BLOOD/URIC ACID: CPT | Performed by: NURSE PRACTITIONER

## 2023-04-05 PROCEDURE — 86140 C-REACTIVE PROTEIN: CPT | Performed by: NURSE PRACTITIONER

## 2023-04-05 PROCEDURE — 86200 CCP ANTIBODY: CPT | Performed by: NURSE PRACTITIONER

## 2023-04-05 PROCEDURE — 86431 RHEUMATOID FACTOR QUANT: CPT | Performed by: NURSE PRACTITIONER

## 2023-04-05 RX ORDER — TRAMADOL HYDROCHLORIDE 50 MG/1
TABLET ORAL
COMMUNITY
Start: 2023-03-28 | End: 2023-04-05

## 2023-04-05 RX ORDER — TAMSULOSIN HYDROCHLORIDE 0.4 MG/1
0.4 CAPSULE ORAL DAILY
COMMUNITY
Start: 2023-03-07 | End: 2023-04-12

## 2023-04-05 RX ORDER — FAMOTIDINE 20 MG/1
20 TABLET, FILM COATED ORAL 2 TIMES DAILY
COMMUNITY

## 2023-04-05 RX ORDER — PREDNISONE 5 MG/1
TABLET ORAL
COMMUNITY
Start: 2023-03-03 | End: 2023-05-18

## 2023-04-05 RX ORDER — OMEGA-3 FATTY ACIDS/FISH OIL 300-1000MG
200 CAPSULE ORAL DAILY PRN
COMMUNITY
End: 2023-09-01

## 2023-04-05 RX ORDER — CELECOXIB 100 MG/1
100 CAPSULE ORAL 2 TIMES DAILY
Qty: 60 CAPSULE | Refills: 3 | Status: SHIPPED | OUTPATIENT
Start: 2023-04-05 | End: 2023-04-05

## 2023-04-05 RX ORDER — MELOXICAM 15 MG/1
15 TABLET ORAL DAILY PRN
Qty: 30 TABLET | Refills: 2 | Status: SHIPPED | OUTPATIENT
Start: 2023-04-05 | End: 2023-04-05

## 2023-04-05 RX ORDER — MELOXICAM 15 MG/1
TABLET ORAL
Qty: 90 TABLET | Refills: 0 | Status: SHIPPED | OUTPATIENT
Start: 2023-04-05 | End: 2023-05-18

## 2023-04-05 RX ORDER — PROCHLORPERAZINE MALEATE 10 MG
TABLET ORAL
COMMUNITY
Start: 2023-01-20 | End: 2023-04-05

## 2023-04-05 RX ORDER — ABIRATERONE ACETATE 250 MG/1
1000 TABLET ORAL DAILY
COMMUNITY
End: 2023-06-23

## 2023-04-05 RX ORDER — LEVOFLOXACIN 500 MG/1
TABLET, FILM COATED ORAL
COMMUNITY
Start: 2022-07-28 | End: 2023-04-05

## 2023-04-05 RX ORDER — NYSTATIN 100000/ML
SUSPENSION, ORAL (FINAL DOSE FORM) ORAL
COMMUNITY
Start: 2022-10-12 | End: 2023-09-01

## 2023-04-05 ASSESSMENT — PATIENT HEALTH QUESTIONNAIRE - PHQ9
10. IF YOU CHECKED OFF ANY PROBLEMS, HOW DIFFICULT HAVE THESE PROBLEMS MADE IT FOR YOU TO DO YOUR WORK, TAKE CARE OF THINGS AT HOME, OR GET ALONG WITH OTHER PEOPLE: VERY DIFFICULT
SUM OF ALL RESPONSES TO PHQ QUESTIONS 1-9: 13
SUM OF ALL RESPONSES TO PHQ QUESTIONS 1-9: 13

## 2023-04-05 ASSESSMENT — PAIN SCALES - GENERAL: PAINLEVEL: EXTREME PAIN (8)

## 2023-04-05 NOTE — PROGRESS NOTES
Assessment & Plan     Arthralgia of multiple joints  -possibly due to side effects from Abiraterone? Recommended to rule out inflammatory arthritis such as RA, thyroid disease  - CRP, inflammation; Future  - ESR: Erythrocyte sedimentation rate; Future  - Rheumatoid factor; Future  - Uric acid; Future  - Cyclic Citrullinated Peptide Antibody IgG; Future  - Lyme Disease Total Abs Bld with Reflex to Confirm CLIA; Future  - Adult Rheumatology  Referral; Future  - recommended to try celecoxib (CELEBREX) 100 MG capsule; Take 1 capsule (100 mg) by mouth 2 times daily  - CBC with platelets and differential; Future  - CRP, inflammation  - ESR: Erythrocyte sedimentation rate  - Rheumatoid factor  - Uric acid  - Cyclic Citrullinated Peptide Antibody IgG  - Lyme Disease Total Abs Bld with Reflex to Confirm CLIA  - CBC with platelets and differential    Fatigue, unspecified type    - TSH with free T4 reflex; Future  - CBC with platelets and differential; Future  - TSH with free T4 reflex  - CBC with platelets and differential    Crohn's disease with complication, unspecified gastrointestinal tract location (H)  -stable, no recent episodes of abdominal pain and diarrhea   - CBC with platelets and differential    Malignant tumor of prostate (H)  -following oncology, recent prostate-specific antigen test was 0.2 per patient report, improved significantly from 28.3  - CBC with platelets and differential    Depression Screening Follow Up        4/5/2023     7:16 AM   PHQ   PHQ-9 Total Score 13   Q9: Thoughts of better off dead/self-harm past 2 weeks Not at all           FRANKO Kingston CNP  [facility] is a 68 year old, presenting for the following health issues:  Joint Pain      History of Present Illness       Reason for visit:  Severe joint pain  Symptom onset:  3-4 weeks ago  Symptoms include:  Severe joint pain in all joints except feet  Symptom intensity:   "Severe  Symptom progression:  Staying the same  Had these symptoms before:  No  What makes it worse:  No  What makes it better:  Ibuprofen    He eats 2-3 servings of fruits and vegetables daily.He consumes 1 sweetened beverage(s) daily.He exercises with enough effort to increase his heart rate 9 or less minutes per day.  He exercises with enough effort to increase his heart rate 3 or less days per week.   He is taking medications regularly.    Today's PHQ-9         PHQ-9 Total Score: 13    PHQ-9 Q9 Thoughts of better off dead/self-harm past 2 weeks :   Not at all    How difficult have these problems made it for you to do your work, take care of things at home, or get along with other people: Very difficult           Review of Systems   Constitutional, HEENT, cardiovascular, pulmonary, gi and gu systems are negative, except as otherwise noted.      Objective    /82 (BP Location: Left arm, Patient Position: Sitting, Cuff Size: Adult Large)   Pulse 96   Temp 97.6  F (36.4  C) (Tympanic)   Resp 16   Ht 1.676 m (5' 6\")   Wt 83 kg (183 lb)   SpO2 98%   BMI 29.54 kg/m    Body mass index is 29.54 kg/m .  Physical Exam   GENERAL: healthy, alert and no distress  EYES: Eyes grossly normal to inspection, PERRL and conjunctivae and sclerae normal  MS: no gross musculoskeletal defects noted, no edema  SKIN: no suspicious lesions or rashes  NEURO: Normal strength and tone, mentation intact and speech normal  PSYCH: mentation appears normal, affect normal/bright                    "

## 2023-04-06 LAB
B BURGDOR IGG+IGM SER QL: 0.35
RHEUMATOID FACT SER NEPH-ACNC: 222 IU/ML

## 2023-04-06 NOTE — TELEPHONE ENCOUNTER
Prior Authorization Retail Medication Request    Medication/Dose: celecoxib  ICD code (if different than what is on RX):    Previously Tried and Failed:    Rationale:      Insurance Name:  [phone]  Insurance ID:  [id number]      Pharmacy Information (if different than what is on RX)  Name:    Phone:

## 2023-04-07 LAB — CCP AB SER IA-ACNC: >340 U/ML

## 2023-04-11 NOTE — TELEPHONE ENCOUNTER
Central Prior Authorization Team   Phone: [phone]    PA Initiation    Medication: Celecoxib 100 mg-PA initiated  Insurance Company: [employer] D - Phone [phone] Fax [phone]  Pharmacy Filling the Rx: [id number] [place] [address] [address] AT [facility] [address] [facility]  Filling Pharmacy Phone: [phone]  Filling Pharmacy Fax:    Start Date: 4/11/2023

## 2023-04-13 NOTE — TELEPHONE ENCOUNTER
PRIOR AUTHORIZATION DENIED    Medication: Celecoxib 100 mg-PA DENIED    Denial Date: 4/11/2023    Denial Rational:         Appeal Information:

## 2023-04-14 NOTE — TELEPHONE ENCOUNTER
This was already addressed, patient is aware and he was prescribed alternative medication    FRANKO Kingston CNP

## 2023-05-15 ENCOUNTER — MYC MEDICAL ADVICE (OUTPATIENT)
Dept: FAMILY MEDICINE | Facility: CLINIC | Age: 68
End: 2023-05-15
Payer: MEDICARE

## 2023-05-15 DIAGNOSIS — M05.742 RHEUMATOID ARTHRITIS INVOLVING BOTH HANDS WITH POSITIVE RHEUMATOID FACTOR (H): Primary | ICD-10-CM

## 2023-05-15 DIAGNOSIS — M05.741 RHEUMATOID ARTHRITIS INVOLVING BOTH HANDS WITH POSITIVE RHEUMATOID FACTOR (H): Primary | ICD-10-CM

## 2023-05-16 NOTE — TELEPHONE ENCOUNTER
See [employer] message, pt was seen in clinic on 4/5/23 for joint pain. Referral has already been ordered, flagging for PSC completion and routing to ordering provider with update.     Thuy Pope RN  [facility]

## 2023-05-17 PROBLEM — K64.9 HEMORRHOIDS: Status: ACTIVE | Noted: 2019-01-04

## 2023-05-17 PROBLEM — K59.00 CONSTIPATION: Status: ACTIVE | Noted: 2022-10-08

## 2023-05-17 PROBLEM — K63.3 ULCERATION OF INTESTINE: Status: ACTIVE | Noted: 2019-01-04

## 2023-05-17 PROBLEM — R97.20 RAISED PROSTATE SPECIFIC ANTIGEN: Status: ACTIVE | Noted: 2022-08-17

## 2023-05-17 PROBLEM — K57.30 DIVERTICULAR DISEASE OF LARGE INTESTINE: Status: ACTIVE | Noted: 2019-01-04

## 2023-05-17 NOTE — TELEPHONE ENCOUNTER
Faxed referral and last two OV to [facility], [phone], per patient request. Britni Sethi on 5/17/2023 at 8:35 AM

## 2023-05-18 RX ORDER — TRAMADOL HYDROCHLORIDE 50 MG/1
50 TABLET ORAL 2 TIMES DAILY PRN
Qty: 15 TABLET | Refills: 0 | Status: SHIPPED | OUTPATIENT
Start: 2023-05-18 | End: 2023-06-03

## 2023-05-18 RX ORDER — PREDNISONE 5 MG/1
TABLET ORAL
Qty: 63 TABLET | Refills: 0 | Status: SHIPPED | OUTPATIENT
Start: 2023-05-18 | End: 2023-06-03

## 2023-05-18 NOTE — TELEPHONE ENCOUNTER
Ashley  See [facility] regarding pain control issues      Magan Kowalski RN

## 2023-06-02 ENCOUNTER — MYC MEDICAL ADVICE (OUTPATIENT)
Dept: FAMILY MEDICINE | Facility: CLINIC | Age: 68
End: 2023-06-02
Payer: MEDICARE

## 2023-06-02 DIAGNOSIS — M05.742 RHEUMATOID ARTHRITIS INVOLVING BOTH HANDS WITH POSITIVE RHEUMATOID FACTOR (H): ICD-10-CM

## 2023-06-02 DIAGNOSIS — M05.741 RHEUMATOID ARTHRITIS INVOLVING BOTH HANDS WITH POSITIVE RHEUMATOID FACTOR (H): ICD-10-CM

## 2023-06-03 RX ORDER — TRAMADOL HYDROCHLORIDE 50 MG/1
50 TABLET ORAL 2 TIMES DAILY PRN
Qty: 15 TABLET | Refills: 0 | Status: SHIPPED | OUTPATIENT
Start: 2023-06-03 | End: 2023-09-01

## 2023-06-03 RX ORDER — PREDNISONE 5 MG/1
10 TABLET ORAL 2 TIMES DAILY
Qty: 28 TABLET | Refills: 0 | Status: SHIPPED | OUTPATIENT
Start: 2023-06-03 | End: 2023-06-20

## 2023-06-14 ENCOUNTER — TRANSFERRED RECORDS (OUTPATIENT)
Dept: HEALTH INFORMATION MANAGEMENT | Facility: CLINIC | Age: 68
End: 2023-06-14
Payer: MEDICARE

## 2023-06-15 ENCOUNTER — TRANSFERRED RECORDS (OUTPATIENT)
Dept: HEALTH INFORMATION MANAGEMENT | Facility: CLINIC | Age: 68
End: 2023-06-15
Payer: MEDICARE

## 2023-06-20 ENCOUNTER — TRANSFERRED RECORDS (OUTPATIENT)
Dept: HEALTH INFORMATION MANAGEMENT | Facility: CLINIC | Age: 68
End: 2023-06-20
Payer: MEDICARE

## 2023-06-20 ENCOUNTER — MYC REFILL (OUTPATIENT)
Dept: FAMILY MEDICINE | Facility: CLINIC | Age: 68
End: 2023-06-20
Payer: MEDICARE

## 2023-06-20 DIAGNOSIS — M05.742 RHEUMATOID ARTHRITIS INVOLVING BOTH HANDS WITH POSITIVE RHEUMATOID FACTOR (H): ICD-10-CM

## 2023-06-20 DIAGNOSIS — M05.741 RHEUMATOID ARTHRITIS INVOLVING BOTH HANDS WITH POSITIVE RHEUMATOID FACTOR (H): ICD-10-CM

## 2023-06-21 RX ORDER — PREDNISONE 5 MG/1
10 TABLET ORAL 2 TIMES DAILY
Qty: 28 TABLET | Refills: 0 | Status: SHIPPED | OUTPATIENT
Start: 2023-06-21 | End: 2023-06-23

## 2023-06-23 ENCOUNTER — TRANSFERRED RECORDS (OUTPATIENT)
Dept: HEALTH INFORMATION MANAGEMENT | Facility: CLINIC | Age: 68
End: 2023-06-23

## 2023-06-23 ENCOUNTER — MYC MEDICAL ADVICE (OUTPATIENT)
Dept: FAMILY MEDICINE | Facility: CLINIC | Age: 68
End: 2023-06-23
Payer: MEDICARE

## 2023-06-23 DIAGNOSIS — M05.742 RHEUMATOID ARTHRITIS INVOLVING BOTH HANDS WITH POSITIVE RHEUMATOID FACTOR (H): ICD-10-CM

## 2023-06-23 DIAGNOSIS — M05.741 RHEUMATOID ARTHRITIS INVOLVING BOTH HANDS WITH POSITIVE RHEUMATOID FACTOR (H): ICD-10-CM

## 2023-06-23 RX ORDER — PREDNISONE 10 MG/1
10 TABLET ORAL 2 TIMES DAILY
Qty: 60 TABLET | Refills: 2 | Status: SHIPPED | OUTPATIENT
Start: 2023-06-23 | End: 2023-09-01

## 2023-06-23 RX ORDER — ABIRATERONE ACETATE 250 MG/1
250 TABLET ORAL DAILY
COMMUNITY
Start: 2023-06-23

## 2023-06-23 NOTE — TELEPHONE ENCOUNTER
RN reached out to patient via phone for clarification on what he's needing.   Patient was instructed to return call to [facility] main line at [phone] to speak with an RN.  Ayan sent and will await response.    Noa Riddle RN  [facility]

## 2023-06-23 NOTE — TELEPHONE ENCOUNTER
Forwarding to provider for review please.  Patient was advised to schedule a follow-up to discuss, which he states he'll do, but was adamant about having this sent through to see if provider will continue prescribing prednisone for now.    Patient returns call, essentially he has two prescriptions for prednisone- one from [facility] Dr. Ambriz for 5 mg once daily.  This is long-term while he's taking the abiraterone, as it can cause joint pain.  Then the other Rx recently sent by Ashley is for 10 mg twice daily. The challenge is that the insurance company sees it as one drug, so they're denying the Rx from Ashley, since he already received the 30 day supply from [facility]. Technician at [facility] advised he work with the two entities to figure out one prescription.  Right now patient has his one month supply (30 tabs) from [facility], so this will at least last him about a week if he's taking 4 daily.  Then he's able to  Ashley's Rx on 6/25/23, per insurance.  However, this was only still for a week supply, as patient was advised to follow-up with rheumatology for the prednisone more long-term.    Patient states that he did contact Rheumatology and had a consultation with them on 6/15/23-see encounter.  However, it's difficult to get in with them, and he has kind of put follow-up with them on hold for now due to his recent PET scan showing possible return of his prostate cancer. He states he has some other things to address, so may not be able to see them again for awhile.    RN advised a f/u appt to discuss this with Ashley, since she's only seen him once and clearly advised that ongoing prednisone Rxs should be managed by specialty. Patient declines to have RN assist with scheduling appt and states that he will do so via [employer] and prefers virtual.   RN advised will forward his updates to [place] per his request.       Noa Riddle RN  [facility]

## 2023-06-23 NOTE — TELEPHONE ENCOUNTER
I sent refill for Prednisone, for 30 days plus refills.   I agree that treatment of prostate cancer should be addressed first  Patient can also ask his oncologist to contact rheumatologist who he recently saw from [facility] rheumatology, so they can determine which treatment plan will be safe and best for the patient.     FRANKO Kingston CNP

## 2023-06-27 ENCOUNTER — TRANSFERRED RECORDS (OUTPATIENT)
Dept: HEALTH INFORMATION MANAGEMENT | Facility: CLINIC | Age: 68
End: 2023-06-27
Payer: MEDICARE

## 2023-06-30 ENCOUNTER — TRANSFERRED RECORDS (OUTPATIENT)
Dept: HEALTH INFORMATION MANAGEMENT | Facility: CLINIC | Age: 68
End: 2023-06-30
Payer: MEDICARE

## 2023-07-05 ENCOUNTER — TRANSFERRED RECORDS (OUTPATIENT)
Dept: HEALTH INFORMATION MANAGEMENT | Facility: CLINIC | Age: 68
End: 2023-07-05
Payer: MEDICARE

## 2023-07-06 ENCOUNTER — MYC MEDICAL ADVICE (OUTPATIENT)
Dept: FAMILY MEDICINE | Facility: CLINIC | Age: 68
End: 2023-07-06
Payer: MEDICARE

## 2023-07-06 ENCOUNTER — TRANSFERRED RECORDS (OUTPATIENT)
Dept: HEALTH INFORMATION MANAGEMENT | Facility: CLINIC | Age: 68
End: 2023-07-06
Payer: MEDICARE

## 2023-07-07 ENCOUNTER — TRANSFERRED RECORDS (OUTPATIENT)
Dept: HEALTH INFORMATION MANAGEMENT | Facility: CLINIC | Age: 68
End: 2023-07-07
Payer: MEDICARE

## 2023-07-07 NOTE — TELEPHONE ENCOUNTER
Please contact patient to schedule Covid shot appointment with [facility] and I recommend to contact his insurance regarding shingles, I think for medicare patients shingrix covered only if patient gets it done over at any retail pharmacy.    FRANKO Kingston CNP

## 2023-07-07 NOTE — TELEPHONE ENCOUNTER
Routed to provider.  Please see [employer] message from pt.  Any further instructions?  Natasha Longo RN

## 2023-07-12 ENCOUNTER — TRANSFERRED RECORDS (OUTPATIENT)
Dept: HEALTH INFORMATION MANAGEMENT | Facility: CLINIC | Age: 68
End: 2023-07-12
Payer: MEDICARE

## 2023-07-20 ENCOUNTER — HOSPITAL ENCOUNTER (OUTPATIENT)
Dept: CT IMAGING | Facility: HOSPITAL | Age: 68
Discharge: HOME | End: 2023-07-20
Attending: NURSE PRACTITIONER | Admitting: NURSE PRACTITIONER
Payer: MEDICARE

## 2023-07-20 ENCOUNTER — OFFICE VISIT (OUTPATIENT)
Dept: FAMILY MEDICINE | Facility: CLINIC | Age: 68
End: 2023-07-20
Payer: MEDICARE

## 2023-07-20 VITALS
RESPIRATION RATE: 18 BRPM | OXYGEN SATURATION: 97 % | DIASTOLIC BLOOD PRESSURE: 86 MMHG | BODY MASS INDEX: 28.89 KG/M2 | TEMPERATURE: 98.7 F | WEIGHT: 179 LBS | HEART RATE: 100 BPM | SYSTOLIC BLOOD PRESSURE: 146 MMHG

## 2023-07-20 DIAGNOSIS — R26.9 ABNORMAL GAIT: ICD-10-CM

## 2023-07-20 DIAGNOSIS — S09.93XA FACIAL INJURY, INITIAL ENCOUNTER: ICD-10-CM

## 2023-07-20 DIAGNOSIS — J33.9 NASAL POLYP: ICD-10-CM

## 2023-07-20 DIAGNOSIS — J32.0 CHRONIC MAXILLARY SINUSITIS: Primary | ICD-10-CM

## 2023-07-20 PROCEDURE — 12013 RPR F/E/E/N/L/M 2.6-5.0 CM: CPT | Performed by: NURSE PRACTITIONER

## 2023-07-20 PROCEDURE — G1010 CDSM STANSON: HCPCS

## 2023-07-20 PROCEDURE — 99215 OFFICE O/P EST HI 40 MIN: CPT | Mod: 25 | Performed by: NURSE PRACTITIONER

## 2023-07-20 RX ORDER — ACETAMINOPHEN 325 MG/1
975 TABLET ORAL ONCE
Status: COMPLETED | OUTPATIENT
Start: 2023-07-20 | End: 2023-07-20

## 2023-07-20 RX ADMIN — ACETAMINOPHEN 975 MG: 325 TABLET ORAL at 13:14

## 2023-07-20 ASSESSMENT — ENCOUNTER SYMPTOMS
PHOTOPHOBIA: 0
CONFUSION: 0
NAUSEA: 0
DIZZINESS: 0
RHINORRHEA: 0
HEADACHES: 0
VOMITING: 0

## 2023-07-20 NOTE — PROGRESS NOTES
"Assessment & Plan     Facial injury, initial encounter    - CT Facial Bones without Contrast  - acetaminophen (TYLENOL) tablet 975 mg    Chronic maxillary sinusitis    - amoxicillin-clavulanate (AUGMENTIN) 875-125 MG tablet  Dispense: 20 tablet; Refill: 0  - Adult ENT  Referral    Nasal polyp      Abnormal gait       Patient with history of abnormal gait due to medication side effect who tripped and fell landing on concrete sidewalk just prior to arrival.  Orbital swelling, abrasion and bruising.  + Tender.  No LOC, signs of concussion.  No real head trauma above the level of the eyebrow.    Discussed facial bone CT.  Patient is okay with this.  Facial bone CT was negative for fracture.     However, CT does show \"Moderately advanced chronic right greater than left maxillary sinusitis. There is adjacent dehiscence of the right orbital floor. This is concerning for pending pathway for intraorbital infection.  3.  Suspect posterior right nasal cavity polyp.\"     Patient says he has had multiple PET scans and was never told he had a polyp nor the dehiscence of the right orbital floor.  Was aware of sinus congestion from previous PET scans.     Spoke with [facility] [place] MD about these findings after being told no semiurgent appointments available with our ENT.     He recommended 30 mg of prednisone for 5 days as well as Augmentin for 10 days.  Patient is already on 25 mg of prednisone daily for unrelated condition.    As the patient he does not have much in the way of sinus congestion, can call for ENT appointment in 2 weeks or less or sooner if symptoms are worsening.    Patient directed to radiology desk to sign paper to have CT copy sent to his house.    Laceration was repaired with irrigation and Exelon glue over the right eyebrow.    Back with any concussion symptoms, signs of infection -eye pain, new areas of swelling around the eye.     Patient will use the cane he has at home for stability.  " Patient says oncology providers are well aware of his gait abnormality.  55 minutes spent by me on the date of the encounter doing chart review, review of test results, patient visit, documentation, discussion with other provider(s) and Not inclusive of laceration repair         No follow-ups on file.    Senait Solomon [facility] TRACY Saez is a 68 year old male who presents to clinic today for the following health issues:  Chief Complaint   Patient presents with     Eye Problem     Pt fell around 12:00pm today and hit head above and right eye swollen and bleeding      HPI    Patient tripped and fell and hit his face on concrete while giving a mattress to a dump for recycling.  Patient is on a medication for prostate, Zytiga, which affects his coordination and balance somewhat.  Says he sometimes will trip as a result of this.    Laceration to the right lateral eyebrow area.    Denies any concussion symptoms, LOC.  He is not on thinners.    No neck pain.    He was able to brace his fall a bit.   Last Tdap was 2018.    Patient denies significant sinusitis symptoms such as nasal congestion, purulent drainage.      Review of Systems   HENT: Positive for congestion and sneezing. Negative for dental problem (Denies dental pain, difficulty opening and closing jaw) and rhinorrhea.    Eyes: Negative for photophobia and visual disturbance.   Gastrointestinal: Negative for nausea and vomiting.   Neurological: Negative for dizziness, syncope and headaches.   Psychiatric/Behavioral: Negative for confusion.           Objective    BP (!) 146/86 (BP Location: Right arm, Patient Position: Sitting, Cuff Size: Adult Regular)   Pulse 100   Temp 98.7  F (37.1  C) (Oral)   Resp 18   Wt 81.2 kg (179 lb)   SpO2 97%   BMI 28.89 kg/m    Physical Exam  Constitutional:       Appearance: Normal appearance.   HENT:      Head:      Jaw: There is normal jaw occlusion. No swelling, pain on movement  or malocclusion.        Comments: Periorbital swelling, bruising with mild tenderness.  Able to open and close eyes normally.  Able to move eyes/EOMs  normally without pain.     Laceration, 3 cm, superficial to the right lateral eyebrow area.    Able to open and close jaw normally       Nose: No congestion or rhinorrhea.   Eyes:      Extraocular Movements: Extraocular movements intact.      Conjunctiva/sclera: Conjunctivae normal.      Pupils: Pupils are equal, round, and reactive to light.      Comments: No eye pain with EOMs.   Pulmonary:      Effort: Pulmonary effort is normal.   Musculoskeletal:      Cervical back: Normal range of motion. No tenderness.   Skin:     Findings: Bruising present.      Comments: Bruising and abrasion right periorbital area.    Approximately 3 cm jagged laceration overlying the right eyebrow   Neurological:      Mental Status: He is alert and oriented to person, place, and time.      Gait: Gait abnormal (Right foot with some difficulty with control).   Psychiatric:         Mood and Affect: Mood normal.        Results for orders placed or performed during the hospital encounter of 07/20/23   CT Facial Bones without Contrast     Status: None    Narrative    EXAM: CT FACIAL BONES WITHOUT CONTRAST  LOCATION: [facility]  DATE: 7/20/2023    INDICATION: Right periorbital trauma and swelling  COMPARISON: None.  TECHNIQUE: Routine CT Maxillofacial without IV contrast. Multiplanar reformats. Dose reduction techniques were used.     FINDINGS:  RIGHT ORBIT: There is moderate right preseptal periorbital soft tissue and right premalar soft tissue contusion. Normal globe, extraocular muscles, optic nerve/sheath, periorbital fat and lacrimal gland. Negative for right-sided orbital fracture.   Incidentally noted is bony dehiscence at the right orbital floor series 400 image 49. Status post cataract repair.    LEFT ORBIT: Normal periorbital soft tissues, bony orbit, globe,  extraocular muscles, optic nerve/sheath, periorbital fat and lacrimal gland.    SINUSES: There is complete opacification of the right maxillary sinus. There is moderate mucosal thickening of the left maxillary sinus. There is mucoperiosteal reaction about the maxillary sinuses bilaterally. Remaining paranasal sinuses are   well-aerated. There is arrested pneumatization of the left frontal sinus. Incidentally noted is suspected nasal polyp at the posterior nasal cavity on the right measuring 15 x 9 mm.    VISUALIZED INTRACRANIAL CONTENTS: No abnormality.       Impression    IMPRESSION:   1.  Right periorbital soft tissue contusion without acute fracture.  2.  Moderately advanced chronic right greater than left maxillary sinusitis. There is adjacent dehiscence of the right orbital floor. This is concerning for pending pathway for intraorbital infection.  3.  Suspect posterior right nasal cavity polyp.

## 2023-07-20 NOTE — PROCEDURES
Laceration repair    Right eyebrow -3 cm jagged laceration about 2 mm deep without foreign body.    Area was copiously irrigated with about 200 cc of normal saline.    Explained that I could suture this for more cosmetically pleasing appearance or use Exelon glue but scar would appear either way.  Patient is okay with glue. Exelon glue was applied with good control of bleeding and cosmetic appearance.

## 2023-07-20 NOTE — PATIENT INSTRUCTIONS
Please call the [facility].  They are scheduling out about 2 weeks, but if you are starting to have any type of problem with your sinuses (plugged nose on the right side, yellow or green drainage) you can call earlier and say you were seen here and that we talked to the ENT and you should be added onto the schedule.     Augmentin for sinusitis.    Please bring a copy of your CT report and CT disk -     I used skin glue on your right eyebrow.  Okay to shower with this.  Avoid scrubbing the area.  This will heal on its own/come off in 7 to 10 days.    Tylenol as needed for discomfort.  Continue ice packs up to once an hour for 20 minutes at a time for the next 2 days.

## 2023-08-09 ENCOUNTER — TELEPHONE (OUTPATIENT)
Dept: FAMILY MEDICINE | Facility: CLINIC | Age: 68
End: 2023-08-09
Payer: MEDICARE

## 2023-08-09 NOTE — TELEPHONE ENCOUNTER
Reason for Call:  Appointment Request    Patient requesting this type of appt: Chronic [facility] Management/Medication/Follow-Up    Requested provider: Ashley Akhtar    Reason patient unable to be scheduled: complete physical and update on health problems, concerns about neuropathy, medication problems with med--abiraterone recent decrease of dose he showed improvement, stability issues ?'s if this is a side effect to abiraterone, recent fall in July found a lesion during ct scan, saw ent at [facility] awaiting biospy, leg strength.  Recent radiation and chemo treatment--prostate.     Patient does not want a medicare physical.     Explained to patient it might be best to keep sept visit and do update sooner.     When does patient want to be seen/preferred time: sooner than 9/1    Could we send this information to you in [employer] or would you prefer to receive a phone call?:   Patient would prefer a phone call   Okay to leave a detailed message?: Yes at Home number on file [phone] (home)    Call taken on 8/9/2023 at 1:40 PM by Kayla Lynn

## 2023-08-15 NOTE — TELEPHONE ENCOUNTER
Reached pt re: telephone message below. Pt reports that he has general health updates that he would like to notify PCP of. Also says that he's been having problems with neuropathy in his feet, since the end of his last chemo treatment for prostate cancer last January. Says that usually the neuropathy would resolve on it's own after some time, but says that it hasn't gone away this time. Says that it's been affecting his mobility and stability, and he's had to use a cane as a result. Also says that he had a fall on 7/20/23. Pt denies injury from that fall, but said that during the work up for this fall, a polyp was found in his right nasal passage. This was recently biopsied, but pt hasn't received the results yet. Pt is also interested in applying for a handicap parking permit, and would like to discuss this with PCP ASAP. Writer scheduled pt for a virtual visit tomorrow to further discuss, and pt will also keep currently scheduled physical on 9/1/23. Routing to PCP as NATALIA Pope RN  [facility]

## 2023-08-16 ENCOUNTER — VIRTUAL VISIT (OUTPATIENT)
Dept: FAMILY MEDICINE | Facility: CLINIC | Age: 68
End: 2023-08-16
Payer: MEDICARE

## 2023-08-16 DIAGNOSIS — R29.898 BILATERAL LEG WEAKNESS: ICD-10-CM

## 2023-08-16 DIAGNOSIS — R26.81 GAIT INSTABILITY: ICD-10-CM

## 2023-08-16 DIAGNOSIS — M05.79 RHEUMATOID ARTHRITIS INVOLVING MULTIPLE SITES WITH POSITIVE RHEUMATOID FACTOR (H): Primary | ICD-10-CM

## 2023-08-16 DIAGNOSIS — T45.1X5A PERIPHERAL NEUROPATHY DUE TO CHEMOTHERAPY (H): ICD-10-CM

## 2023-08-16 DIAGNOSIS — G62.0 PERIPHERAL NEUROPATHY DUE TO CHEMOTHERAPY (H): ICD-10-CM

## 2023-08-16 DIAGNOSIS — R73.09 ELEVATED GLUCOSE LEVEL: ICD-10-CM

## 2023-08-16 PROBLEM — M06.9 RHEUMATOID ARTHRITIS (H): Status: ACTIVE | Noted: 2023-08-16

## 2023-08-16 PROCEDURE — 99214 OFFICE O/P EST MOD 30 MIN: CPT | Mod: VID | Performed by: NURSE PRACTITIONER

## 2023-08-16 NOTE — PROGRESS NOTES
Se is a 68 year old who is being evaluated via a billable video visit.      Chief Complaint   Patient presents with    NEUROPATHY    Forms     Would like to get a handicap parking form        How would you like to obtain your AVS? Ayan - He will log in at 5PM.   If the video visit is dropped, the invitation should be resent by: Text to cell phone: [phone]  Will anyone else be joining your video visit? No      Patient has in person visit schedule for 9/1.    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)  -signed handicap parking certificate  -recommended to try physical therapy again   - Physical Therapy Referral; Future    Elevated glucose level  -he has been on chronic Prednisone for almost a year, he had couple episodes of elevated glucose level in the past, recommended to check A1C   - Hemoglobin A1c; Future    Bilateral leg weakness  -patient will follow up with in person visit in September   - Physical Therapy Referral; Future    Gait instability  -recommended physical therapy   - Physical Therapy Referral; Future        FRANKO Kingston [facility]    Subjective   Se is a 68 year old, presenting for the following health issues: neuropathy. Patient reports chronic numbness and tingling in his feet that started after radiation therapy for prostate cancer. He noted that his symptoms got worse last October when he was in half way through chemotherapy and radiation, he also noted weakness in his legs and his oncologist referred him for physical therapy which he states helped at that time. Patient report he recently fell on parking lot, states he fell face down and what caused the fall was instability and numbness in his feet, he started using walker after this accident.  He would like to have handicap parking certificate which I agree is appropriate.    He is following rheumatology now for RA treatment, he is still on Prednisone and reports pain in his shoulders improved significantly.      NEUROPATHY and Forms (Would like to get a handicap parking form )        8/16/2023     9:56 AM   Additional Questions   Roomed by chiquita   Patient states  that he's been having problems with neuropathy in his feet, since the end of his last chemo treatment for prostate cancer last January. Says that usually the neuropathy would resolve on it's own after some time, but says that it hasn't gone away this time. Says that it's been affecting his mobility and stability, and he's had to use a cane as a result. Also says that he had a fall on 7/20/23. Pt denies injury from that fall, but said that during the work up for this fall, a polyp was found in his right nasal passage. This was recently biopsied, but pt hasn't received the results yet. Pt is also interested in getting a handicap parking permit.      Review of Systems   Constitutional, HEENT, cardiovascular, pulmonary, gi and gu systems are negative, except as otherwise noted.      Objective           Vitals:  No vitals were obtained today due to virtual visit.    Physical Exam   GENERAL: Healthy, alert and no distress  EYES: Eyes grossly normal to inspection.  No discharge or erythema, or obvious scleral/conjunctival abnormalities.  RESP: No audible wheeze, cough, or visible cyanosis.  No visible retractions or increased work of breathing.    SKIN: Visible skin clear. No significant rash, abnormal pigmentation or lesions.  NEURO: Cranial nerves grossly intact.  Mentation and speech appropriate for age.  PSYCH: Mentation appears normal, affect normal/bright, judgement and insight intact, normal speech and appearance well-groomed.    No results found for any visits on 08/16/23.            Video-Visit Details    Type of service:  Video Visit    Video Start Time: 5:07 PM  Video End Time:5:33 PM    Originating Location (pt. Location): Home    Distant Location (provider location):  On-site  Platform used for Video Visit: Crystal

## 2023-08-17 ENCOUNTER — TRANSFERRED RECORDS (OUTPATIENT)
Dept: HEALTH INFORMATION MANAGEMENT | Facility: CLINIC | Age: 68
End: 2023-08-17
Payer: MEDICARE

## 2023-08-17 ENCOUNTER — TELEPHONE (OUTPATIENT)
Dept: FAMILY MEDICINE | Facility: CLINIC | Age: 68
End: 2023-08-17
Payer: MEDICARE

## 2023-08-17 NOTE — TELEPHONE ENCOUNTER
Patient Returning Call    Reason for call:  Please send a message through [employer] to the patient and provide him with instructions on whether he needs to fast for the lab tests on 8/21/2023. He is unaware of any fasting and what labs will be done.     Information relayed to patient:  None, that PCP will leave a message in his [employer].     Patient has additional questions:  No    What are your questions/concerns:  Patient wants to know fasting instructions for lab on 8/21/2023 and what kind of labs will be completed.     Could we send this information to you in [employer] or would you prefer to receive a phone call?:   Patient would like to be contacted via [employer]

## 2023-08-21 ENCOUNTER — MYC MEDICAL ADVICE (OUTPATIENT)
Dept: FAMILY MEDICINE | Facility: CLINIC | Age: 68
End: 2023-08-21

## 2023-08-21 ENCOUNTER — LAB (OUTPATIENT)
Dept: LAB | Facility: CLINIC | Age: 68
End: 2023-08-21
Attending: NURSE PRACTITIONER
Payer: MEDICARE

## 2023-08-21 DIAGNOSIS — R73.09 ELEVATED GLUCOSE LEVEL: ICD-10-CM

## 2023-08-21 LAB — HBA1C MFR BLD: 6.6 % (ref 0–5.6)

## 2023-08-21 PROCEDURE — 83036 HEMOGLOBIN GLYCOSYLATED A1C: CPT

## 2023-08-21 PROCEDURE — 36415 COLL VENOUS BLD VENIPUNCTURE: CPT

## 2023-08-24 ENCOUNTER — MYC MEDICAL ADVICE (OUTPATIENT)
Dept: FAMILY MEDICINE | Facility: CLINIC | Age: 68
End: 2023-08-24
Payer: MEDICARE

## 2023-08-25 ASSESSMENT — ENCOUNTER SYMPTOMS
WEAKNESS: 1
SORE THROAT: 0
DIZZINESS: 0
CONSTIPATION: 0
NAUSEA: 0
DIARRHEA: 0
FEVER: 0
HEARTBURN: 0
NERVOUS/ANXIOUS: 0
FREQUENCY: 1
MYALGIAS: 1
HEMATURIA: 0
EYE PAIN: 0
CHILLS: 0
ARTHRALGIAS: 1
PALPITATIONS: 0
ABDOMINAL PAIN: 1
DYSURIA: 0
HEMATOCHEZIA: 0
HEADACHES: 0
COUGH: 0
JOINT SWELLING: 1
PARESTHESIAS: 1
SHORTNESS OF BREATH: 0

## 2023-08-25 ASSESSMENT — ACTIVITIES OF DAILY LIVING (ADL): CURRENT_FUNCTION: NO ASSISTANCE NEEDED

## 2023-08-25 NOTE — TELEPHONE ENCOUNTER
The patient does not want to discuss through the mychart or on the phone. The patient states that is it, urinating at night and that it is.  The patient does not wish to discuss with RN.  This is going to be addressed at the clinic visit.    Thank you    Alexus PENNINGTON RN

## 2023-08-25 NOTE — TELEPHONE ENCOUNTER
See [employer] message.  Attempted to contact patient. Non detailed message left to return call to the clinic.    Patient has upcoming Wellness appointment on 9/1/23.     Ros Manzo RN

## 2023-08-31 ASSESSMENT — PATIENT HEALTH QUESTIONNAIRE - PHQ9
SUM OF ALL RESPONSES TO PHQ QUESTIONS 1-9: 10
SUM OF ALL RESPONSES TO PHQ QUESTIONS 1-9: 10
10. IF YOU CHECKED OFF ANY PROBLEMS, HOW DIFFICULT HAVE THESE PROBLEMS MADE IT FOR YOU TO DO YOUR WORK, TAKE CARE OF THINGS AT HOME, OR GET ALONG WITH OTHER PEOPLE: EXTREMELY DIFFICULT

## 2023-09-01 ENCOUNTER — OFFICE VISIT (OUTPATIENT)
Dept: FAMILY MEDICINE | Facility: CLINIC | Age: 68
End: 2023-09-01
Payer: MEDICARE

## 2023-09-01 VITALS
RESPIRATION RATE: 18 BRPM | TEMPERATURE: 98.2 F | SYSTOLIC BLOOD PRESSURE: 118 MMHG | WEIGHT: 182.9 LBS | HEIGHT: 67 IN | HEART RATE: 113 BPM | OXYGEN SATURATION: 98 % | DIASTOLIC BLOOD PRESSURE: 78 MMHG | BODY MASS INDEX: 28.71 KG/M2

## 2023-09-01 DIAGNOSIS — R35.1 NOCTURIA: ICD-10-CM

## 2023-09-01 DIAGNOSIS — E09.9 STEROID-INDUCED DIABETES MELLITUS, INITIAL ENCOUNTER (H): ICD-10-CM

## 2023-09-01 DIAGNOSIS — Z00.00 ANNUAL PHYSICAL EXAM: Primary | ICD-10-CM

## 2023-09-01 DIAGNOSIS — M05.741 RHEUMATOID ARTHRITIS INVOLVING BOTH HANDS WITH POSITIVE RHEUMATOID FACTOR (H): ICD-10-CM

## 2023-09-01 DIAGNOSIS — C61 MALIGNANT TUMOR OF PROSTATE (H): ICD-10-CM

## 2023-09-01 DIAGNOSIS — M05.742 RHEUMATOID ARTHRITIS INVOLVING BOTH HANDS WITH POSITIVE RHEUMATOID FACTOR (H): ICD-10-CM

## 2023-09-01 DIAGNOSIS — T38.0X5A STEROID-INDUCED DIABETES MELLITUS, INITIAL ENCOUNTER (H): ICD-10-CM

## 2023-09-01 LAB
ALBUMIN UR-MCNC: ABNORMAL MG/DL
APPEARANCE UR: CLEAR
BACTERIA #/AREA URNS HPF: ABNORMAL /HPF
BILIRUB UR QL STRIP: NEGATIVE
COLOR UR AUTO: YELLOW
CREAT UR-MCNC: 157.6 MG/DL
GLUCOSE UR STRIP-MCNC: NEGATIVE MG/DL
HGB UR QL STRIP: NEGATIVE
KETONES UR STRIP-MCNC: NEGATIVE MG/DL
LEUKOCYTE ESTERASE UR QL STRIP: NEGATIVE
MICROALBUMIN UR-MCNC: <12 MG/L
MICROALBUMIN/CREAT UR: NORMAL MG/G{CREAT}
MUCOUS THREADS #/AREA URNS LPF: PRESENT /LPF
NITRATE UR QL: NEGATIVE
PH UR STRIP: 6.5 [PH] (ref 5–7)
RBC #/AREA URNS AUTO: ABNORMAL /HPF
SP GR UR STRIP: 1.01 (ref 1–1.03)
SQUAMOUS #/AREA URNS AUTO: ABNORMAL /LPF
UROBILINOGEN UR STRIP-ACNC: 0.2 E.U./DL
WBC #/AREA URNS AUTO: ABNORMAL /HPF

## 2023-09-01 PROCEDURE — G0439 PPPS, SUBSEQ VISIT: HCPCS | Performed by: NURSE PRACTITIONER

## 2023-09-01 PROCEDURE — 82570 ASSAY OF URINE CREATININE: CPT | Performed by: NURSE PRACTITIONER

## 2023-09-01 PROCEDURE — 81001 URINALYSIS AUTO W/SCOPE: CPT | Performed by: NURSE PRACTITIONER

## 2023-09-01 PROCEDURE — 99214 OFFICE O/P EST MOD 30 MIN: CPT | Mod: 25 | Performed by: NURSE PRACTITIONER

## 2023-09-01 PROCEDURE — 82043 UR ALBUMIN QUANTITATIVE: CPT | Performed by: NURSE PRACTITIONER

## 2023-09-01 RX ORDER — METFORMIN HCL 500 MG
500 TABLET, EXTENDED RELEASE 24 HR ORAL
Qty: 90 TABLET | Refills: 1 | Status: SHIPPED | OUTPATIENT
Start: 2023-09-01 | End: 2023-11-27

## 2023-09-01 RX ORDER — TAMSULOSIN HYDROCHLORIDE 0.4 MG/1
0.4 CAPSULE ORAL DAILY
Qty: 90 CAPSULE | Refills: 3 | Status: SHIPPED | OUTPATIENT
Start: 2023-09-01 | End: 2023-11-27

## 2023-09-01 RX ORDER — PREDNISONE 5 MG/1
5 TABLET ORAL 2 TIMES DAILY
Status: SHIPPED | DISCHARGE
Start: 2023-09-01 | End: 2024-01-31

## 2023-09-01 ASSESSMENT — ENCOUNTER SYMPTOMS
HEADACHES: 0
WEAKNESS: 1
ARTHRALGIAS: 1
EYE PAIN: 0
HEARTBURN: 0
DYSURIA: 0
FREQUENCY: 1
JOINT SWELLING: 1
SHORTNESS OF BREATH: 0
COUGH: 0
MYALGIAS: 1
DIZZINESS: 0
PALPITATIONS: 0
SORE THROAT: 0
NAUSEA: 0
CHILLS: 0
HEMATOCHEZIA: 0
DIARRHEA: 0
HEMATURIA: 0
NERVOUS/ANXIOUS: 0
FEVER: 0
ABDOMINAL PAIN: 1
CONSTIPATION: 0
PARESTHESIAS: 1

## 2023-09-01 ASSESSMENT — PAIN SCALES - GENERAL: PAINLEVEL: MILD PAIN (3)

## 2023-09-01 ASSESSMENT — ACTIVITIES OF DAILY LIVING (ADL): CURRENT_FUNCTION: NO ASSISTANCE NEEDED

## 2023-09-01 NOTE — PATIENT INSTRUCTIONS
Urine test    Try Flomax for at least 4-6 weeks, if no improvement may stop    May try alpha lipoic acid supplement, might be helpful for neuropathy    Start Metformin 500 mg daily with meal     Recheck A1C in 3 months

## 2023-09-01 NOTE — PROGRESS NOTES
"SUBJECTIVE:   Se is a 68 year old who presents for Preventive Visit.      9/1/2023     8:18 AM   Additional Questions   Roomed by Nisa PIZARRO LPN   Accompanied by self         9/1/2023     8:18 AM   Patient Reported Additional Medications   Patient reports taking the following new medications Elligard injections every 3 months       Are you in the first 12 months of your Medicare coverage?  No    Healthy Habits:     In general, how would you rate your overall health?  Fair    Frequency of exercise:  None    Do you usually eat at least 4 servings of fruit and vegetables a day, include whole grains    & fiber and avoid regularly eating high fat or \"junk\" foods?  No    Taking medications regularly:  Yes    Barriers to taking medications:  None    Medication side effects:  Muscle aches    Ability to successfully perform activities of daily living:  No assistance needed    Home Safety:  No safety concerns identified    Hearing Impairment:  Feel that people are mumbling or not speaking clearly, difficult to understand a speaker at a public meeting or Muslim service, need to ask people to speak up or repeat themselves, find that men's voices are easier to understand than woman's and difficulty understanding soft or whispered speech    In the past 6 months, have you been bothered by leaking of urine? Yes    In general, how would you rate your overall mental or emotional health?  Good    Additional concerns today:  Yes (has a new diagnosis for DM and is experiencing neuropathy in fingertips and both feet, also having fatigue in his legs)      Have you ever done Advance Care Planning? (For example, a Health Directive, POLST, or a discussion with a medical provider or your loved ones about your wishes): No, advance care planning information given to patient to review.  Patient plans to discuss their wishes with loved ones or provider.    Medication Followup of Abiraterone 250mg   Taking Medication as prescribed: NO-ordered " as 1000mg daily, patient is only taking 500mg daily  Side Effects:  weakness and fatigue  Medication Helping Symptoms:  yes       Right Ear:      1000 Hz RESPONSE- on Level: 40 db (Conditioning sound)   1000 Hz: RESPONSE- on Level:   20 db    2000 Hz: RESPONSE- on Level: tone not heard   4000 Hz: RESPONSE- on Level: tone not heard    Left Ear:      4000 Hz: RESPONSE- on Level: tone not heard   2000 Hz: RESPONSE- on Level: tone not heard   1000 Hz: RESPONSE- on Level:   20 db     500 Hz: RESPONSE- on Level: tone not heard    Right Ear:    500 Hz: RESPONSE- on Level: tone not heard    Hearing Acuity: REFER    Hearing Assessment: abnormal--refer to audiology   Fall risk  Fallen 2 or more times in the past year?: Yes  Any fall with injury in the past year?: Yes  Get up and go test 12.1 seconds  Cognitive Screening   1) Repeat 3 items (Leader, Season, Table)    2) Clock draw: NORMAL  3) 3 item recall: Recalls 2 objects   Results: NORMAL clock, 1-2 items recalled: COGNITIVE IMPAIRMENT LESS LIKELY    Mini-CogTM Copyright FERNANDO Guevara. Licensed by the author for use in [employer]; reprinted with permission (kareem@[facility]). All rights reserved.      Do you have sleep apnea, excessive snoring or daytime drowsiness? : Yes, this week he has been experiencing fatigue and drowsiness and has been napping during the day. Hasn't had this issue prior to this last week or so. He does get up in the night every 1-1.5 hours to urinate.    Reviewed and updated as needed this visit by clinical staff   Tobacco  Allergies  Meds  Problems             Reviewed and updated as needed this visit by Provider    Allergies  Meds  Problems            Social History     Tobacco Use    Smoking status: Former     Types: Cigarettes     Quit date: 2004     Years since quittin.8    Smokeless tobacco: Never   Substance Use Topics    Alcohol use: No         2023     9:58 AM   Alcohol Use   Prescreen: >3 drinks/day or >7  drinks/week? No     Do you have a current opioid prescription? No  Do you use any other controlled substances or medications that are not prescribed by a provider? None  {Provider      Current providers sharing in care for this patient include:   Patient Care Team:  Ashley Akhtar APRN CNP as PCP - General (Family Medicine)  Ashley Akhtar APRN CNP as Assigned PCP    The following health maintenance items are reviewed in Epic and correct as of today:  Health Maintenance   Topic Date Due    MEDICARE ANNUAL WELLNESS VISIT  07/08/2023    INFLUENZA VACCINE (1) 09/01/2023    ZOSTER IMMUNIZATION (2 of 2) 09/15/2023    ANNUAL REVIEW OF HM ORDERS  09/01/2024    FALL RISK ASSESSMENT  09/01/2024    LIPID  07/08/2027    DTAP/TDAP/TD IMMUNIZATION (2 - Td or Tdap) 07/30/2028    ADVANCE CARE PLANNING  09/01/2028    COLORECTAL CANCER SCREENING  01/04/2029    HEPATITIS C SCREENING  Completed    PHQ-2 (once per calendar year)  Completed    Pneumococcal Vaccine: 65+ Years  Completed    AORTIC ANEURYSM SCREENING (SYSTEM ASSIGNED)  Completed    COVID-19 Vaccine  Completed    IPV IMMUNIZATION  Aged Out    HPV IMMUNIZATION  Aged Out    MENINGITIS IMMUNIZATION  Aged Out       Review of Systems   Constitutional:  Negative for chills and fever.   HENT:  Negative for congestion, ear pain, hearing loss and sore throat.    Eyes:  Negative for pain and visual disturbance.   Respiratory:  Negative for cough and shortness of breath.    Cardiovascular:  Negative for chest pain, palpitations and peripheral edema.   Gastrointestinal:  Positive for abdominal pain. Negative for constipation, diarrhea, heartburn, hematochezia and nausea.   Genitourinary:  Positive for frequency, impotence and urgency. Negative for dysuria, genital sores, hematuria and penile discharge.   Musculoskeletal:  Positive for arthralgias, joint swelling and myalgias.   Skin:  Negative for rash.   Neurological:  Positive for weakness and paresthesias.  "Negative for dizziness and headaches.   Psychiatric/Behavioral:  Negative for mood changes. The patient is not nervous/anxious.      OBJECTIVE:   /78 (BP Location: Left arm, Patient Position: Sitting, Cuff Size: Adult Large)   Pulse 113   Temp 98.2  F (36.8  C) (Tympanic)   Resp 18   Ht 1.697 m (5' 6.81\")   Wt 83 kg (182 lb 14.4 oz)   SpO2 98%   BMI 28.81 kg/m   Estimated body mass index is 28.81 kg/m  as calculated from the following:    Height as of this encounter: 1.697 m (5' 6.81\").    Weight as of this encounter: 83 kg (182 lb 14.4 oz).  Physical Exam  GENERAL: healthy, alert and no distress  EYES: Eyes grossly normal to inspection, PERRL and conjunctivae and sclerae normal  HENT: ear canals and TM's normal, nose and mouth without ulcers or lesions  NECK: no adenopathy, no asymmetry, masses, or scars and thyroid normal to palpation  RESP: lungs clear to auscultation - no rales, rhonchi or wheezes  CV: regular rate and rhythm, normal S1 S2, no S3 or S4, no murmur, click or rub, no peripheral edema and peripheral pulses strong  MS: no gross musculoskeletal defects noted, no edema  SKIN: no suspicious lesions or rashes  NEURO: Normal strength and tone, mentation intact and speech normal  PSYCH: mentation appears normal, affect normal/bright    Diagnostic Test Results:  Labs reviewed in Epic  Results for orders placed or performed in visit on 09/01/23 (from the past 24 hour(s))   UA Macroscopic with reflex to Microscopic and Culture - Lab Collect    Specimen: Urine, Midstream   Result Value Ref Range    Color Urine Yellow Colorless, Straw, Light Yellow, Yellow    Appearance Urine Clear Clear    Glucose Urine Negative Negative mg/dL    Bilirubin Urine Negative Negative    Ketones Urine Negative Negative mg/dL    Specific Gravity Urine 1.015 1.003 - 1.035    Blood Urine Negative Negative    pH Urine 6.5 5.0 - 7.0    Protein Albumin Urine Trace (A) Negative mg/dL    Urobilinogen Urine 0.2 0.2, 1.0 E.U./dL "    Nitrite Urine Negative Negative    Leukocyte Esterase Urine Negative Negative   Albumin Random Urine Quantitative with Creat Ratio   Result Value Ref Range    Creatinine Urine mg/dL 157.6 mg/dL    Albumin Urine mg/L <12.0 mg/L    Albumin Urine mg/g Cr     UA Microscopic with Reflex to Culture   Result Value Ref Range    Bacteria Urine Few (A) None Seen /HPF    RBC Urine 0-2 0-2 /HPF /HPF    WBC Urine 0-5 0-5 /HPF /HPF    Squamous Epithelials Urine Few (A) None Seen /LPF    Mucus Urine Present (A) None Seen /LPF    Narrative    Urine Culture not indicated       ASSESSMENT / PLAN:   (Z00.00) Annual physical exam  (primary encounter diagnosis)  Comment: discussed recent labs  Plan: patient complained of decreased hearing, might need audiology referral in future       (M05.741,  M05.742) Rheumatoid arthritis involving both hands with positive rheumatoid factor (H)  Comment: following rheumatologist, decreased Prednisone to 5 mg twice daily   Plan: predniSONE (DELTASONE) 5 MG tablet            (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,  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, initial encounter (H)  Comment: start Metformin, follow diabetic diet and repeat A1C in 3 months   Plan: metFORMIN (GLUCOPHAGE XR) 500 MG 24 hr tablet,         Hemoglobin A1c, Albumin Random Urine         Quantitative with Creat Ratio            (R35.1) Nocturia  Comment: will try Tamsulosin, will consider Proscar if no improvement   Plan: tamsulosin (FLOMAX) 0.4 MG capsule, UA         Macroscopic with reflex to Microscopic and         Culture - Lab Collect, UA Microscopic with         Reflex to Culture            (C61) Malignant tumor of prostate (H)  Comment: following oncology, scheduled to repeat PET scan on October 18th   Plan: leuprolide (LUPRON DEPOT) 22.5 MG kit          Depression Screening Follow Up        8/31/2023     9:30 AM   PHQ   PHQ-9 Total Score 10   Q9: Thoughts of better off dead/self-harm past 2 weeks Not at all               COUNSELING:  Reviewed  "preventive health counseling, as reflected in patient instructions       Regular exercise       Healthy diet/nutrition      BMI:   Estimated body mass index is 28.81 kg/m  as calculated from the following:    Height as of this encounter: 1.697 m (5' 6.81\").    Weight as of this encounter: 83 kg (182 lb 14.4 oz).         He reports that he quit smoking about 18 years ago. His smoking use included cigarettes. He has never used smokeless tobacco.      Appropriate preventive services were discussed with this patient, including applicable screening as appropriate for cardiovascular disease, diabetes, osteopenia/osteoporosis, and glaucoma.  As appropriate for age/gender, discussed screening for colorectal cancer, prostate cancer, breast cancer, and cervical cancer. Checklist reviewing preventive services available has been given to the patient.    Reviewed patients plan of care and provided an AVS. The Basic Care Plan (routine screening as documented in Health Maintenance) for Se meets the Care Plan requirement. This Care Plan has been established and reviewed with the Patient.          [facility]    Identified Health Risks:  Answers submitted by the patient for this visit:  Patient Health Questionnaire (Submitted on 8/31/2023)  If you checked off any problems, how difficult have these problems made it for you to do your work, take care of things at home, or get along with other people?: Extremely difficult  PHQ9 TOTAL SCORE: 10    "

## 2023-09-05 ENCOUNTER — MYC MEDICAL ADVICE (OUTPATIENT)
Dept: FAMILY MEDICINE | Facility: CLINIC | Age: 68
End: 2023-09-05
Payer: MEDICARE

## 2023-09-05 DIAGNOSIS — R29.898 WEAKNESS OF BOTH LEGS: ICD-10-CM

## 2023-09-05 DIAGNOSIS — T38.0X5A STEROID-INDUCED DIABETES MELLITUS, INITIAL ENCOUNTER (H): ICD-10-CM

## 2023-09-05 DIAGNOSIS — Z53.20 HEARING SCREENING DECLINED: Primary | ICD-10-CM

## 2023-09-05 DIAGNOSIS — R00.0 TACHYCARDIA: Primary | ICD-10-CM

## 2023-09-05 DIAGNOSIS — E09.9 STEROID-INDUCED DIABETES MELLITUS, INITIAL ENCOUNTER (H): ICD-10-CM

## 2023-09-06 ENCOUNTER — TELEPHONE (OUTPATIENT)
Dept: FAMILY MEDICINE | Facility: CLINIC | Age: 68
End: 2023-09-06
Payer: MEDICARE

## 2023-09-06 DIAGNOSIS — R29.898 WEAKNESS OF BOTH LEGS: Primary | ICD-10-CM

## 2023-09-06 NOTE — TELEPHONE ENCOUNTER
Ashley Akhtar:    Please see note below, have a referral cued up, please advise.      NOHEMY Rivas

## 2023-09-06 NOTE — TELEPHONE ENCOUNTER
Patient calling. Asking for his PT referral to  be for [facility] in [place].     Fax: [phone]  Phone: [phone]

## 2023-09-06 NOTE — TELEPHONE ENCOUNTER
Ashley,    Patient sends my chart messages.  Wondering if there is something for his neuropathy. I read last notes and advised the lipoic acid supplement.  How many calorie diet did you want him on for his diabetes.  Any follow up needed on his fast heart rate? Vandana MORRISON RN

## 2023-09-06 NOTE — TELEPHONE ENCOUNTER
Ashley,    Pended new PT referral and listed the [facility] by [facility]. Vandana MORRISON RN

## 2023-09-06 NOTE — TELEPHONE ENCOUNTER
The patient tried to set up appt with Diabetic education and they were not able to schedule. They need to know if he is type 1 or type 2 in order to schedule.    Please verify for the referral.     Thank you    Alexus PENNINGTON RN

## 2023-09-07 ENCOUNTER — MYC MEDICAL ADVICE (OUTPATIENT)
Dept: FAMILY MEDICINE | Facility: CLINIC | Age: 68
End: 2023-09-07

## 2023-09-07 ENCOUNTER — LAB (OUTPATIENT)
Dept: LAB | Facility: CLINIC | Age: 68
End: 2023-09-07
Payer: MEDICARE

## 2023-09-07 DIAGNOSIS — D64.9 ANEMIA, UNSPECIFIED TYPE: Primary | ICD-10-CM

## 2023-09-07 DIAGNOSIS — R00.0 TACHYCARDIA: ICD-10-CM

## 2023-09-07 LAB
ANION GAP SERPL CALCULATED.3IONS-SCNC: 13 MMOL/L (ref 7–15)
BUN SERPL-MCNC: 12.5 MG/DL (ref 8–23)
CALCIUM SERPL-MCNC: 9 MG/DL (ref 8.8–10.2)
CHLORIDE SERPL-SCNC: 100 MMOL/L (ref 98–107)
CREAT SERPL-MCNC: 0.82 MG/DL (ref 0.67–1.17)
DEPRECATED HCO3 PLAS-SCNC: 23 MMOL/L (ref 22–29)
EGFRCR SERPLBLD CKD-EPI 2021: >90 ML/MIN/1.73M2
ERYTHROCYTE [DISTWIDTH] IN BLOOD BY AUTOMATED COUNT: 19.1 % (ref 10–15)
GLUCOSE SERPL-MCNC: 139 MG/DL (ref 70–99)
HCT VFR BLD AUTO: 34.6 % (ref 40–53)
HGB BLD-MCNC: 11.7 G/DL (ref 13.3–17.7)
MCH RBC QN AUTO: 33.8 PG (ref 26.5–33)
MCHC RBC AUTO-ENTMCNC: 33.8 G/DL (ref 31.5–36.5)
MCV RBC AUTO: 100 FL (ref 78–100)
PLATELET # BLD AUTO: 236 10E3/UL (ref 150–450)
POTASSIUM SERPL-SCNC: 4.1 MMOL/L (ref 3.4–5.3)
RBC # BLD AUTO: 3.46 10E6/UL (ref 4.4–5.9)
SODIUM SERPL-SCNC: 136 MMOL/L (ref 136–145)
TSH SERPL DL<=0.005 MIU/L-ACNC: 1.18 UIU/ML (ref 0.3–4.2)
WBC # BLD AUTO: 5.1 10E3/UL (ref 4–11)

## 2023-09-07 PROCEDURE — 36415 COLL VENOUS BLD VENIPUNCTURE: CPT

## 2023-09-07 PROCEDURE — 85027 COMPLETE CBC AUTOMATED: CPT

## 2023-09-07 PROCEDURE — 84443 ASSAY THYROID STIM HORMONE: CPT

## 2023-09-07 PROCEDURE — 80048 BASIC METABOLIC PNL TOTAL CA: CPT

## 2023-09-07 NOTE — TELEPHONE ENCOUNTER
Tanisha at [facility] was called, she said they have a new scheduling system. She will contact her supervisor to add to the code to their list.   Pt was called, detailed message left on pt identified VM telling him the reason for the problems and telling him to call scheduling again. Vandana Martinez RN

## 2023-09-07 NOTE — TELEPHONE ENCOUNTER
Writer contacted scheduling for more information.  says that steroid induced diabetes isn't a listed option for them; says that their list includes CGM, gestational, injectable meds, insulin pump, pre-diabetes, type 1 or 2 diabetes. Routing to PCP for review/recommendation.    Thuy Pope RN  [facility]

## 2023-09-07 NOTE — TELEPHONE ENCOUNTER
Steroid induced diabetes is on referral and its one of the options for referral patient need eduction about diet for diabetes, diabetes is diabetes, please schedule patient. If  does not know what to do and how to schedule patient they should discuss this with their management.    FRANKO Kingston CNP

## 2023-09-08 NOTE — TELEPHONE ENCOUNTER
MyChart reply sent to patient and closing encounter.     Noa Riddle RN  [facility]

## 2023-09-12 ENCOUNTER — MYC MEDICAL ADVICE (OUTPATIENT)
Dept: FAMILY MEDICINE | Facility: CLINIC | Age: 68
End: 2023-09-12
Payer: MEDICARE

## 2023-09-13 ENCOUNTER — TRANSFERRED RECORDS (OUTPATIENT)
Dept: HEALTH INFORMATION MANAGEMENT | Facility: CLINIC | Age: 68
End: 2023-09-13
Payer: MEDICARE

## 2023-09-14 ENCOUNTER — TRANSFERRED RECORDS (OUTPATIENT)
Dept: HEALTH INFORMATION MANAGEMENT | Facility: CLINIC | Age: 68
End: 2023-09-14
Payer: MEDICARE

## 2023-09-14 NOTE — TELEPHONE ENCOUNTER
Ashley  Pt states he had labs done at [facility] on 9-12 and wonders if any of those will help. Also when would you like him to get the labs you placed done?      Magan Kowalski, RN

## 2023-09-14 NOTE — TELEPHONE ENCOUNTER
Labs were ordered to follow up on low hemoglobin.   Labs that patient did through oncology were for genetic screening for tumor markers.  When he will be next time in for labs for oncology I recommend to recheck CBC, also will check B12 level, ferritin and folate to make sure there are no other factors, or nutritional deficiencies causing anemia.     FRANKO Kingston CNP

## 2023-09-15 ENCOUNTER — MYC MEDICAL ADVICE (OUTPATIENT)
Dept: FAMILY MEDICINE | Facility: CLINIC | Age: 68
End: 2023-09-15
Payer: MEDICARE

## 2023-09-15 NOTE — TELEPHONE ENCOUNTER
Left message for the patient to call the clinic.  And my chart sent to the patient to call us to schedule an appt. Vandana MORRISON RN

## 2023-09-15 NOTE — TELEPHONE ENCOUNTER
Yes, I recommend patient to schedule follow up visit to discuss elevated HR    Okay to use same day    .mec

## 2023-09-18 ENCOUNTER — OFFICE VISIT (OUTPATIENT)
Dept: FAMILY MEDICINE | Facility: CLINIC | Age: 68
End: 2023-09-18
Payer: MEDICARE

## 2023-09-18 VITALS
HEIGHT: 66 IN | BODY MASS INDEX: 29.09 KG/M2 | OXYGEN SATURATION: 99 % | TEMPERATURE: 98 F | WEIGHT: 181 LBS | DIASTOLIC BLOOD PRESSURE: 68 MMHG | HEART RATE: 90 BPM | RESPIRATION RATE: 16 BRPM | SYSTOLIC BLOOD PRESSURE: 130 MMHG

## 2023-09-18 DIAGNOSIS — Z23 NEEDS FLU SHOT: ICD-10-CM

## 2023-09-18 DIAGNOSIS — R00.0 TACHYCARDIA: ICD-10-CM

## 2023-09-18 DIAGNOSIS — D64.9 ANEMIA, UNSPECIFIED TYPE: ICD-10-CM

## 2023-09-18 DIAGNOSIS — R53.83 FATIGUE, UNSPECIFIED TYPE: Primary | ICD-10-CM

## 2023-09-18 LAB
BASOPHILS # BLD AUTO: 0 10E3/UL (ref 0–0.2)
BASOPHILS NFR BLD AUTO: 0 %
EOSINOPHIL # BLD AUTO: 0 10E3/UL (ref 0–0.7)
EOSINOPHIL NFR BLD AUTO: 0 %
ERYTHROCYTE [DISTWIDTH] IN BLOOD BY AUTOMATED COUNT: 20.1 % (ref 10–15)
FERRITIN SERPL-MCNC: 356 NG/ML (ref 31–409)
FOLATE SERPL-MCNC: 9.7 NG/ML (ref 4.6–34.8)
HCT VFR BLD AUTO: 34.7 % (ref 40–53)
HGB BLD-MCNC: 11.6 G/DL (ref 13.3–17.7)
IMM GRANULOCYTES # BLD: 0 10E3/UL
IMM GRANULOCYTES NFR BLD: 0 %
IRON BINDING CAPACITY (ROCHE): 227 UG/DL (ref 240–430)
IRON SATN MFR SERPL: 29 % (ref 15–46)
IRON SERPL-MCNC: 66 UG/DL (ref 61–157)
LYMPHOCYTES # BLD AUTO: 0.4 10E3/UL (ref 0.8–5.3)
LYMPHOCYTES NFR BLD AUTO: 7 %
MCH RBC QN AUTO: 34.7 PG (ref 26.5–33)
MCHC RBC AUTO-ENTMCNC: 33.4 G/DL (ref 31.5–36.5)
MCV RBC AUTO: 104 FL (ref 78–100)
MONOCYTES # BLD AUTO: 0.5 10E3/UL (ref 0–1.3)
MONOCYTES NFR BLD AUTO: 9 %
NEUTROPHILS # BLD AUTO: 4.3 10E3/UL (ref 1.6–8.3)
NEUTROPHILS NFR BLD AUTO: 83 %
PLATELET # BLD AUTO: 253 10E3/UL (ref 150–450)
RBC # BLD AUTO: 3.34 10E6/UL (ref 4.4–5.9)
TSH SERPL DL<=0.005 MIU/L-ACNC: 1.17 UIU/ML (ref 0.3–4.2)
VIT B12 SERPL-MCNC: 659 PG/ML (ref 232–1245)
WBC # BLD AUTO: 5.2 10E3/UL (ref 4–11)

## 2023-09-18 PROCEDURE — 82746 ASSAY OF FOLIC ACID SERUM: CPT | Performed by: NURSE PRACTITIONER

## 2023-09-18 PROCEDURE — 82607 VITAMIN B-12: CPT | Performed by: NURSE PRACTITIONER

## 2023-09-18 PROCEDURE — 99214 OFFICE O/P EST MOD 30 MIN: CPT | Performed by: NURSE PRACTITIONER

## 2023-09-18 PROCEDURE — 82728 ASSAY OF FERRITIN: CPT | Performed by: NURSE PRACTITIONER

## 2023-09-18 PROCEDURE — 36415 COLL VENOUS BLD VENIPUNCTURE: CPT | Performed by: NURSE PRACTITIONER

## 2023-09-18 PROCEDURE — 85025 COMPLETE CBC W/AUTO DIFF WBC: CPT | Performed by: NURSE PRACTITIONER

## 2023-09-18 PROCEDURE — 84443 ASSAY THYROID STIM HORMONE: CPT | Performed by: NURSE PRACTITIONER

## 2023-09-18 PROCEDURE — 83540 ASSAY OF IRON: CPT | Performed by: NURSE PRACTITIONER

## 2023-09-18 PROCEDURE — 83550 IRON BINDING TEST: CPT | Performed by: NURSE PRACTITIONER

## 2023-09-18 ASSESSMENT — PAIN SCALES - GENERAL: PAINLEVEL: MODERATE PAIN (4)

## 2023-09-18 NOTE — PROGRESS NOTES
"  Assessment & Plan     Fatigue, unspecified type  -most likely due to prostate cancer medications   - TSH with free T4 reflex; Future  - CBC with platelets; Future  - TSH with free T4 reflex    Tachycardia  -improved, can be due to mild anemia due to Lynparza   - TSH with free T4 reflex; Future  - CBC with platelets; Future  - TSH with free T4 reflex    Needs flu shot    - INFLUENZA VACCINE 65+ (FLUZONE HD)    Anemia, unspecified type  -slightly anemic, can be due to lynparza, patient will discuss this with his oncologist  - Vitamin B12  - Ferritin  - Iron and iron binding capacity  - Folate  - CBC with platelets and differential        FRANKO Kingston CNP  [facility] DAMEON Saez is a 68 year old, presenting for the following health issues:  Diabetes        9/18/2023    10:52 AM   Additional Questions   Roomed by Cathi DILL   Accompanied by self       History of Present Illness       Diabetes:   He presents for follow up of diabetes.    He is not checking blood glucose.        He is concerned about other.   He is having numbness in feet.            Vascular Disease:  He presents for follow up of vascular disease.     He never takes nitroglycerin. He is not taking daily aspirin.    He eats 0-1 servings of fruits and vegetables daily.He consumes 0 sweetened beverage(s) daily.He exercises with enough effort to increase his heart rate 20 to 29 minutes per day.  He exercises with enough effort to increase his heart rate 7 days per week.   He is taking medications regularly.         Review of Systems   Constitutional, HEENT, cardiovascular, pulmonary, gi and gu systems are negative, except as otherwise noted.      Objective    /68   Pulse 87   Temp 98  F (36.7  C) (Tympanic)   Resp 16   Ht 1.676 m (5' 6\")   Wt 82.1 kg (181 lb)   SpO2 99%   BMI 29.21 kg/m    Body mass index is 29.21 kg/m .  Physical Exam   GENERAL: healthy, alert and no distress  EYES: Eyes grossly normal to " inspection, PERRL and conjunctivae and sclerae normal  RESP: lungs clear to auscultation - no rales, rhonchi or wheezes  CV: regular rate and rhythm, normal S1 S2, no S3 or S4, no murmur, click or rub, no peripheral edema and peripheral pulses strong  NEURO: Normal strength and tone, mentation intact and speech normal  PSYCH: mentation appears normal, affect normal/bright    Results for orders placed or performed in visit on 09/18/23   Vitamin B12     Status: Normal   Result Value Ref Range    Vitamin B12 659 232 - 1,245 pg/mL   Ferritin     Status: Normal   Result Value Ref Range    Ferritin 356 31 - 409 ng/mL   Iron and iron binding capacity     Status: Abnormal   Result Value Ref Range    Iron 66 61 - 157 ug/dL    Iron Binding Capacity 227 (L) 240 - 430 ug/dL    Iron Sat Index 29 15 - 46 %   Folate     Status: Normal   Result Value Ref Range    Folic Acid 9.7 4.6 - 34.8 ng/mL   TSH with free T4 reflex     Status: Normal   Result Value Ref Range    TSH 1.17 0.30 - 4.20 uIU/mL   CBC with platelets and differential     Status: Abnormal   Result Value Ref Range    WBC Count 5.2 4.0 - 11.0 10e3/uL    RBC Count 3.34 (L) 4.40 - 5.90 10e6/uL    Hemoglobin 11.6 (L) 13.3 - 17.7 g/dL    Hematocrit 34.7 (L) 40.0 - 53.0 %     (H) 78 - 100 fL    MCH 34.7 (H) 26.5 - 33.0 pg    MCHC 33.4 31.5 - 36.5 g/dL    RDW 20.1 (H) 10.0 - 15.0 %    Platelet Count 253 150 - 450 10e3/uL    % Neutrophils 83 %    % Lymphocytes 7 %    % Monocytes 9 %    % Eosinophils 0 %    % Basophils 0 %    % Immature Granulocytes 0 %    Absolute Neutrophils 4.3 1.6 - 8.3 10e3/uL    Absolute Lymphocytes 0.4 (L) 0.8 - 5.3 10e3/uL    Absolute Monocytes 0.5 0.0 - 1.3 10e3/uL    Absolute Eosinophils 0.0 0.0 - 0.7 10e3/uL    Absolute Basophils 0.0 0.0 - 0.2 10e3/uL    Absolute Immature Granulocytes 0.0 <=0.4 10e3/uL   CBC with platelets and differential     Status: Abnormal    Narrative    The following orders were created for panel order CBC with platelets  and differential.  Procedure                               Abnormality         Status                     ---------                               -----------         ------                     CBC with platelets and d...[[id number]]  Abnormal            Final result                 Please view results for these tests on the individual orders.

## 2023-09-19 ENCOUNTER — MYC MEDICAL ADVICE (OUTPATIENT)
Dept: FAMILY MEDICINE | Facility: CLINIC | Age: 68
End: 2023-09-19
Payer: MEDICARE

## 2023-09-19 DIAGNOSIS — E11.9 TYPE 2 DIABETES MELLITUS WITHOUT COMPLICATION, WITHOUT LONG-TERM CURRENT USE OF INSULIN (H): Primary | ICD-10-CM

## 2023-09-19 NOTE — TELEPHONE ENCOUNTER
Ashley,    Patient my charts wondering if he was to get a glucometer?  Pended one for your consideration. Vandana MORRISON RN

## 2023-09-20 RX ORDER — LANCETS
EACH MISCELLANEOUS
Qty: 100 EACH | Refills: 6 | Status: SHIPPED | OUTPATIENT
Start: 2023-09-20

## 2023-09-25 ENCOUNTER — ALLIED HEALTH/NURSE VISIT (OUTPATIENT)
Dept: EDUCATION SERVICES | Facility: CLINIC | Age: 68
End: 2023-09-25
Attending: NURSE PRACTITIONER
Payer: MEDICARE

## 2023-09-25 DIAGNOSIS — T38.0X5A STEROID-INDUCED DIABETES MELLITUS, INITIAL ENCOUNTER (H): ICD-10-CM

## 2023-09-25 DIAGNOSIS — E09.9 STEROID-INDUCED DIABETES MELLITUS, INITIAL ENCOUNTER (H): ICD-10-CM

## 2023-09-25 PROCEDURE — G0108 DIAB MANAGE TRN  PER INDIV: HCPCS | Performed by: DIETITIAN, REGISTERED

## 2023-09-25 NOTE — PATIENT INSTRUCTIONS
Check blood sugar- 2 hours after eating     Breakfast- instead of toast- greek yogurt (try old home), cottage cheese, peanut butter on toast, fairlife milk OR almond milk (either is fine)    Ice cream- breyers carbohydrate smart vanilla OR ice cream bars    Prednisone usually raises blood sugar 6-8 hours later.    5g added sugar ok    60g total carbohydrate per meal suggested max    Sample 4 Carb Breakfast Choices -Carbohydrate choices found in (  )  1 cup of cooked cereal (2)   8 oz skim milk (1)  1 cup berries (1)  2 tablespoons of nuts (0)   1 small whole grain (store bought) bagel (2)  1 fried egg (0)  2 oz lean ham (0)  8 oz skim milk (1)  1 orange (1) 6 oz light yogurt (1)  1 cup berries (1)  2 slices whole grain toast (2)  1-2 Tablespoons peanut butter (0) 1 medium whole grain tortilla (2)  1 medium banana (2)  1 tablespoon peanut butter (0)    cup cottage cheese (0)   8 oz skim milk (1) + 1 packet instant breakfast mix (1)   1 whole grapefruit (2)   2 slices whole grain toast (2)  1 cup skim milk (1)    cup grapes (1)   1 cup cubed sweet potatoes (2)  1 cup skim milk (1)  1 slice whole grain toast (1) with 1 tsp butter (0)  1 scrambled egg (0) 1 whole grain English muffin (2)  1 medium banana (2)  1 Tablespoon peanut butter (0)     4 Carbohydrate Choice Meal Plans for Lunch or Supper   Carbohydrate choices found in (   )  1 cup whole grain spaghetti (3) +   cup low sodium pasta sauce (1) + 3 oz lean ground turkey (0) + 2 cups lettuce/veggies (0) + 2 Tbsp dressing (0) 2 slices of whole grain bread (2)  3 oz lean turkey (0) + lettuce/tomato (0)  2 tsp gaxiola (0)  6 whole grain crackers (1)  1 small apple (1) 1 cup of low sodium broth based soup (1) +  2 slices of whole grain bread (2)   2 oz of low fat cheese (0) + 1 tsp butter (0)  Carrots/celery (0)    cup grapes (1) 2 cups lettuce salad (0) +   cup garbanzo beans (1) + +   cup tuna (0) + 2 Tablespoons low-fat vinaigrette salad dressing (0)  + 6 oz light  yogurt (1) + 3 rye crackers (1) + 8 oz skim milk (1)    2 cups low sodium broth based soup (2) + 6 saltine crackers (1) + 1 small apple (1) + broccoli/cauliflower and low fat dip (0)   3 oz lean steak (0) + medium baked potato (2) + 1 tsp low fat sour cream (0) + 1 cup green beans (0) + 1 small dinner roll (1)   1 cup berries (1) + 1 tbsp light whipped cream (0) 2 cups lettuce salad (0)   3 oz grilled chicken (0)  1-2 Tsbp light Caesar dressing (0)  + 1 Tbps grated cheese (0)  1 cup grapes (2)  5 Triscuit crackers (1)  8 oz skim milk (1)   3 oz chicken (0)  1 cup sweet potato (2)  1 cup asparagus (0)  1 small apple (1)  16 oz sparkling water (unsweetened)  1-8 oz glass skim milk (1)   1 hamburger lucía (0) on hamburger bun (2) + small french fries (2) + + 1 garden salad (0) with 1 package of vinaigrette (0) + 1 cup baby carrots + 1/2 cup green beans- cooked (0) 2 slices whole grain bread (2)+ 2 oz lean ham (0) + leaf lettuce/tomato/onion (0)  2 teaspoons gaxiola (0)  1 medium pear (2)  1 cup raw veggie sticks (0) 3 slices thin crust pizza (3) + 2 cups lettuce salad (0) + 10 cherry tomatoes (0) + 2 Tbsp light salad dressing (0) + 1 small peach (1) 3-6  whole grain tortillas (3) +   cup fat free refried beans (1) + 3 oz shredded lean beef (0) + 2 Tsbp salsa (0)+ lettuce/tomato (0) + 1 Tbsp light sour cream (0)   1 cup low sodium chicken noodle soup (1) + 2 slices whole grain bread (2) + 2 oz lean turkey (0) + 1 oz low fat cheese (0) + 1-2 teaspoons of butter or margarine    cup peaches (1) 3 oz turkey (0) + 1 cup mashed potatoes (2) + 1 tsp butter (0) + 1 cup green beans (0) +   cup unsweetened apple sauce (1)  1-8oz  cup of skim milk (1) 3 oz chicken + 3 medium pierogis (3) + 1/3 cup cabbage (0) +   cup unsweetened applesauce (1) +   cup green beans (0) I cup of beef stroganoff (0) + 1 cup egg noodles (3) + 1 cup broccoli (0) + 1 cup carrots (0) + 1 cup of watermelon (1)

## 2023-09-25 NOTE — LETTER
9/25/2023         RE: Se Quinonez  [address] [facility] [address]        Dear Colleague,    Thank you for referring your patient, Se Quinonez, to the [facility]. Please see a copy of my visit note below.      Diabetes Self-Management Education & Support    Presents for: Initial Assessment for new diagnosis    Type of Service: In Person Visit    Assessment Type:   ASSESSMENT:  Patients blood sugars candelaria due to addition of prednisone.  Additionally his intake is high carbohydrate especially in the mornings.  He has started making some food changes, but was only looking at added sugars vs. Total carbohydrates.  Was taking 25 mg prednisone daily in May-June for joint pain, but decreased down to 5mg daily.       Patient's most recent   Lab Results   Component Value Date    A1C 6.6 08/21/2023     is meeting goal of <7.0    Hemoglobin   Date Value Ref Range Status   09/18/2023 11.6 (L) 13.3 - 17.7 g/dL Final     Glucose   Date Value Ref Range Status   09/07/2023 139 (H) 70 - 99 mg/dL Final   07/08/2022 90 70 - 99 mg/dL Final   07/30/2018 117 70 - 125 mg/dL Final     Diabetes knowledge and skills assessment:   Patient is knowledgeable in diabetes management concepts related to: Being Active    Continue education with the following diabetes management concepts: Healthy Eating, Monitoring, and Taking Medication    Based on learning assessment above, most appropriate setting for further diabetes education would be: Individual setting.      PLAN    Check blood sugar 2 hours after eating.  Add in 1 protein food to breakfast  Consider testing blood sugar 6-8 hours after prednisone is taken.  Aim for 60g total carbohydrate at meals.    Topics to cover at upcoming visits: Healthy Eating, Being Active, Monitoring, and Taking Medication    Follow-up: as needed    See Care Plan for co-developed, patient-state behavior change goals.  AVS provided for patient today.    Education Materials  "Provided:  [facility] Understanding Diabetes Booklet, BG Log Sheet, and My Plate Planner      SUBJECTIVE/OBJECTIVE:  Presents for: Initial Assessment for new diagnosis  Accompanied by: Self  Diabetes education in the past 24mo: No  Focus of Visit: Assistance w/ making life changes  Diabetes type: Type 1  Date of diagnosis: 8/21/23  Disease course: Stable  How confident are you filling out medical forms by yourself:: Extremely  Diabetes management related comments/concerns: Yes. My understanding is the purpose of this appointment is to establish a diet plan.  Transportation concerns: No  Difficulty affording diabetes medication?: No  Difficulty affording diabetes testing supplies?: No  Other concerns:: None  Cultural Influences/Ethnic Background:  Not  or     Diabetes Symptoms & Complications:  Fatigue: Yes  Neuropathy: Yes  Polydipsia: No  Polyphagia: No  Polyuria: No  Visual change: Sometimes  Slow healing wounds: No  Autonomic neuropathy: No  CVA: No  Heart disease: No  Nephropathy: No  Peripheral neuropathy: Yes  Peripheral Vascular Disease: No  Retinopathy: No  Sexual dysfunction: No    Patient Problem List and Family Medical History reviewed for relevant medical history, current medical status, and diabetes risk factors.    Vitals:  There were no vitals taken for this visit.  Estimated body mass index is 29.21 kg/m  as calculated from the following:    Height as of 9/18/23: 1.676 m (5' 6\").    Weight as of 9/18/23: 82.1 kg (181 lb).   Last 3 BP:   BP Readings from Last 3 Encounters:   09/18/23 130/68   09/01/23 118/78   07/20/23 (!) 146/86       History   Smoking Status     Former     Types: Cigarettes     Quit date: 11/1/2004   Smokeless Tobacco     Never       Labs:  Lab Results   Component Value Date    A1C 6.6 08/21/2023     Lab Results   Component Value Date     09/07/2023    GLC 90 07/08/2022     Lab Results   Component Value Date    LDL 85 07/08/2022     Direct Measure HDL "   Date Value Ref Range Status   07/08/2022 43 >=40 mg/dL Final   ]  GFR Estimate   Date Value Ref Range Status   09/07/2023 >90 >60 mL/min/1.73m2 Final   07/30/2018 >60 >60 mL/min/1.73m2 Final     GFR Estimate If Black   Date Value Ref Range Status   07/30/2018 >60 >60 mL/min/1.73m2 Final     Lab Results   Component Value Date    CR 0.82 09/07/2023     No results found for: MICROALBUMIN    Healthy Eating:  Healthy Eating Assessed Today: Yes  Cultural/Lutheran diet restrictions?: No  Meal planning/habits: Avoiding sweets, Low salt  How many times a week on average do you eat food made away from home (restaurant/take-out)?: 2  Meals include: Breakfast, Lunch, Dinner, Morning Snack, Afternoon Snack  Beverages: Water, Tea, Coffee, Milk  Has patient met with a dietitian in the past?: No    9/22/23  B-shredded wheat, 1% milk, 1 canned peach slice canned in juice, 1/2 slice of toast, apricot preserve, butter, coffee  L- chicken noodle soup, 1 pc bread, water  S-apple, tortilla chips and salsa  D- frozen pot pie, 12 oz 1% milk    9/23/23  B- 2 eggs, 1 slice toast, coffee, water  S- apple  L- 2 rye crackers, 4 pc cheese, turkey, pickle, 3 canned pear slices in juice  S- 1/2 banana  D- shredded wheat, milk, peach slice, 1 slice of toast with butter     9/24/23  B- shredded wheat, milk, 2 slice peach, toast with apricot preserve, coffee  L- McChicken,small fries, diet coke  D-2 saltine crackers, culvers chili, coffee    9/25/23  B-shredded wheat, 1% milk, 2 slices peach, 1 slice tread with apricot preserve  148- 3 hours after eating  Last vegetable- last Monday green beans or corn    Stopped- ice cream, fruit vs muffin, culvers milk shake, 1/2 glass orange juice       Being Active:  Being Active Assessed Today: No  Days per week of moderate to strenuous exercise (like a brisk walk): 0  On average, minutes per day of exercise at this level: 10  How intense was your typical exercise? : Light (like stretching or slow  walking)  Exercise Minutes per Week: 0  Barrier to exercise: Physical limitation    Monitoring:  Monitoring Assessed Today: Yes  Did patient bring glucose meter to appointment? : No  Blood Glucose Meter: Other  Times checking blood sugar at home (number): Never  Blood glucose trend: Other    Taking Medications:  Diabetes Medication(s)       Biguanides       metFORMIN (GLUCOPHAGE XR) 500 MG 24 hr tablet    Take 1 tablet (500 mg) by mouth daily (with dinner)            Taking Medication Assessed Today: Yes  Current Treatments: None, Oral Medication (taken by mouth)  Problems taking diabetes medications regularly?: No  Diabetes medication side effects?: No       Reducing Risks:  Diabetes Risks: Age over 45 years, Sedentary Lifestyle  CAD Risks: Sedentary lifestyle, Diabetes Mellitus, Male sex  Has dilated eye exam at least once a year?: Yes  Sees dentist every 6 months?: No  Feet checked by healthcare provider in the last year?: No    Healthy Coping:  Emotional response to diabetes: Ready to learn, Concern for health and well-being  Informal Support system:: Spouse  Stage of change: ACTION (Actively working towards change)  Support resources: None  Patient Activation Measure Survey Score:       No data to display                  Care Plan and Education Provided:  Care Plan: Diabetes   Updates made by Diane Myles RD since 10/2/2023 12:00 AM        Problem: HbA1C Not In Goal         Goal: Establish Regular Follow-Ups with PCP         Task: Discuss with PCP the recommended timing for patient's next follow up visit(s)    Responsible User: Diane Myles RD        Task: Discuss schedule for PCP visits with patient    Responsible User: Diane Myles RD        Goal: Get HbA1C Level in Goal         Task: Educate patient on diabetes education self-management topics    Responsible User: Diane Myles RD        Task: Educate patient on benefits of regular glucose monitoring    Responsible User: Diane Myles RD         Task: Refer patient to appropriate extended care team member, as needed (Medication Therapy Management, Behavioral Health, Physical Therapy, etc.)    Responsible User: Diane Myles RD        Task: Discuss diabetes treatment plan with patient    Responsible User: Diane Myles RD        Problem: Diabetes Self-Management Education Needed to Optimize Self-Care Behaviors         Goal: Understand diabetes pathophysiology and disease progression         Task: Provide education on diabetes pathophysiology and disease progression specfic to patient's diabetes type Completed 10/2/2023   Responsible User: Diane Myles RD        Goal: Healthy Eating - follow a healthy eating pattern for diabetes    This Visit's Progress: 10%   Note:    I will add one protein food to breakfast daily.       Task: Provide education on portion control and consistency in amount, composition and timing of food intake Completed 10/2/2023   Responsible User: Diane Myles RD        Task: Provide education on managing carbohydrate intake (carbohydrate counting, plate planning method, etc.) Completed 10/2/2023   Responsible User: Diane Myles RD        Task: Provide education on weight management    Responsible User: Diane Myles RD        Task: Provide education on heart healthy eating Completed 10/2/2023   Responsible User: Diane Myles RD        Task: Provide education on eating out Completed 10/2/2023   Responsible User: Diane Myles RD        Task: Develop individualized healthy eating plan with patient Completed 10/2/2023   Responsible User: Diane Myles RD        Goal: Being Active - get regular physical activity, working up to at least 150 minutes per week         Task: Provide education on relationship of activity to glucose and precautions to take if at risk for low glucose    Responsible User: Diane Myles RD        Task: Discuss barriers to physical activity with patient    Responsible User:  Diane Myles RD        Task: Develop physical activity plan with patient    Responsible User: Diane Myles RD        Task: Explore community resources including walking groups, assistance programs, and home videos    Responsible User: Diane Myles RD        Goal: Monitoring - monitor glucose and ketones as directed    This Visit's Progress: 10%   Note:    I will test blood sugar 2 hours after eating.       Task: Provide education on blood glucose monitoring (purpose, proper technique, frequency, glucose targets, interpreting results, when to use glucose control solution, sharps disposal)    Responsible User: Diane Myles RD        Task: Provide education on continuous glucose monitoring (sensor placement, use of arron or /reader, understanding glucose trends, alerts and alarms, differences between sensor glucose and blood glucose)    Responsible User: Diane Myles RD        Task: Provide education on ketone monitoring (when to monitor, frequency, etc.)    Responsible User: Diane Myles RD        Goal: Taking Medication - patient is consistently taking medications as directed         Task: Provide education on action of prescribed medication, including when to take and possible side effects Completed 10/2/2023   Responsible User: Diane Myles RD        Task: Provide education on insulin and injectable diabetes medications, including administration, storage, site selection and rotation for injection sites    Responsible User: Diane Myles RD        Task: Discuss barriers to medication adherence with patient and provide management technique ideas as appropriate    Responsible User: Diane Myles RD        Task: Provide education on frequency and refill details of medications Completed 10/2/2023   Responsible User: Diane Myles RD        Goal: Problem Solving - know how to prevent and manage short-term diabetes complications         Task: Provide education on high blood  glucose - causes, signs/symptoms, prevention and treatment    Responsible User: Diane Myles RD        Task: Provide education on low blood glucose - causes, signs/symptoms, prevention, treatment, carrying a carbohydrate source at all times, and medical identification    Responsible User: Diane Myles RD        Task: Provide education on safe travel with diabetes    Responsible User: Diane Myles RD        Task: Provide education on how to care for diabetes on sick days    Responsible User: Diane Myles RD        Task: Provide education on when to call a health care provider    Responsible User: Diane Myles RD        Goal: Reducing Risks - know how to prevent and treat long-term diabetes complications         Task: Provide education on major complications of diabetes, prevention, early diagnostic measures and treatment of complications    Responsible User: Diane Myles RD        Task: Provide education on recommended care for dental, eye and foot health    Responsible User: Diane Myles RD        Task: Provide education on Hemoglobin A1c - goals and relationship to blood glucose levels    Responsible User: Diane Myles RD        Task: Provide education on recommendations for heart health - lipid levels and goals, blood pressure and goals, and aspirin therapy, if indicated    Responsible User: Diane Myles RD        Task: Provide education on tobacco cessation    Responsible User: Diane Myles RD        Goal: Healthy Coping - use available resources to cope with the challenges of managing diabetes         Task: Discuss recognizing feelings about having diabetes    Responsible User: Diane Myles RD        Task: Provide education on the benefits of making appropriate lifestyle changes    Responsible User: Diane Myles RD        Task: Provide education on benefits of utilizing support systems    Responsible User: Diane Myles RD        Task: Discuss methods for coping  with stress    Responsible User: Diane Myles RD        Task: Provide education on when to seek professional counseling    Responsible User: Diane Myles RD Hilary Wilde MS, RD, LD, CDE  Time Spent: 60 minutes  Encounter Type: Individual    Any diabetes medication dose changes were made via the CDE Protocol per the patient's primary care provider. A copy of this encounter was shared with the provider.

## 2023-09-25 NOTE — PROGRESS NOTES
Diabetes Self-Management Education & Support    Presents for: Initial Assessment for new diagnosis    Type of Service: In Person Visit    Assessment Type:   ASSESSMENT:  Patients blood sugars candelaria due to addition of prednisone.  Additionally his intake is high carbohydrate especially in the mornings.  He has started making some food changes, but was only looking at added sugars vs. Total carbohydrates.  Was taking 25 mg prednisone daily in May-June for joint pain, but decreased down to 5mg daily.       Patient's most recent   Lab Results   Component Value Date    A1C 6.6 08/21/2023     is meeting goal of <7.0    Hemoglobin   Date Value Ref Range Status   09/18/2023 11.6 (L) 13.3 - 17.7 g/dL Final     Glucose   Date Value Ref Range Status   09/07/2023 139 (H) 70 - 99 mg/dL Final   07/08/2022 90 70 - 99 mg/dL Final   07/30/2018 117 70 - 125 mg/dL Final     Diabetes knowledge and skills assessment:   Patient is knowledgeable in diabetes management concepts related to: Being Active    Continue education with the following diabetes management concepts: Healthy Eating, Monitoring, and Taking Medication    Based on learning assessment above, most appropriate setting for further diabetes education would be: Individual setting.      PLAN    Check blood sugar 2 hours after eating.  Add in 1 protein food to breakfast  Consider testing blood sugar 6-8 hours after prednisone is taken.  Aim for 60g total carbohydrate at meals.    Topics to cover at upcoming visits: Healthy Eating, Being Active, Monitoring, and Taking Medication    Follow-up: as needed    See Care Plan for co-developed, patient-state behavior change goals.  AVS provided for patient today.    Education Materials Provided:  [facility] Understanding Diabetes Booklet, BG Log Sheet, and My Plate Planner      SUBJECTIVE/OBJECTIVE:  Presents for: Initial Assessment for new diagnosis  Accompanied by: Self  Diabetes education in the past 24mo: No  Focus of Visit:  "Assistance w/ making life changes  Diabetes type: Type 1  Date of diagnosis: 8/21/23  Disease course: Stable  How confident are you filling out medical forms by yourself:: Extremely  Diabetes management related comments/concerns: Yes. My understanding is the purpose of this appointment is to establish a diet plan.  Transportation concerns: No  Difficulty affording diabetes medication?: No  Difficulty affording diabetes testing supplies?: No  Other concerns:: None  Cultural Influences/Ethnic Background:  Not  or     Diabetes Symptoms & Complications:  Fatigue: Yes  Neuropathy: Yes  Polydipsia: No  Polyphagia: No  Polyuria: No  Visual change: Sometimes  Slow healing wounds: No  Autonomic neuropathy: No  CVA: No  Heart disease: No  Nephropathy: No  Peripheral neuropathy: Yes  Peripheral Vascular Disease: No  Retinopathy: No  Sexual dysfunction: No    Patient Problem List and Family Medical History reviewed for relevant medical history, current medical status, and diabetes risk factors.    Vitals:  There were no vitals taken for this visit.  Estimated body mass index is 29.21 kg/m  as calculated from the following:    Height as of 9/18/23: 1.676 m (5' 6\").    Weight as of 9/18/23: 82.1 kg (181 lb).   Last 3 BP:   BP Readings from Last 3 Encounters:   09/18/23 130/68   09/01/23 118/78   07/20/23 (!) 146/86       History   Smoking Status    Former    Types: Cigarettes    Quit date: 11/1/2004   Smokeless Tobacco    Never       Labs:  Lab Results   Component Value Date    A1C 6.6 08/21/2023     Lab Results   Component Value Date     09/07/2023    GLC 90 07/08/2022     Lab Results   Component Value Date    LDL 85 07/08/2022     Direct Measure HDL   Date Value Ref Range Status   07/08/2022 43 >=40 mg/dL Final   ]  GFR Estimate   Date Value Ref Range Status   09/07/2023 >90 >60 mL/min/1.73m2 Final   07/30/2018 >60 >60 mL/min/1.73m2 Final     GFR Estimate If Black   Date Value Ref Range Status   07/30/2018 " >60 >60 mL/min/1.73m2 Final     Lab Results   Component Value Date    CR 0.82 09/07/2023     No results found for: MICROALBUMIN    Healthy Eating:  Healthy Eating Assessed Today: Yes  Cultural/Moravian diet restrictions?: No  Meal planning/habits: Avoiding sweets, Low salt  How many times a week on average do you eat food made away from home (restaurant/take-out)?: 2  Meals include: Breakfast, Lunch, Dinner, Morning Snack, Afternoon Snack  Beverages: Water, Tea, Coffee, Milk  Has patient met with a dietitian in the past?: No    9/22/23  B-shredded wheat, 1% milk, 1 canned peach slice canned in juice, 1/2 slice of toast, apricot preserve, butter, coffee  L- chicken noodle soup, 1 pc bread, water  S-apple, tortilla chips and salsa  D- frozen pot pie, 12 oz 1% milk    9/23/23  B- 2 eggs, 1 slice toast, coffee, water  S- apple  L- 2 rye crackers, 4 pc cheese, turkey, pickle, 3 canned pear slices in juice  S- 1/2 banana  D- shredded wheat, milk, peach slice, 1 slice of toast with butter     9/24/23  B- shredded wheat, milk, 2 slice peach, toast with apricot preserve, coffee  L- McChicken,small fries, diet coke  D-2 saltine crackers, culvers chili, coffee    9/25/23  B-shredded wheat, 1% milk, 2 slices peach, 1 slice tread with apricot preserve  148- 3 hours after eating  Last vegetable- last Monday green beans or corn    Stopped- ice cream, fruit vs muffin, culvers milk shake, 1/2 glass orange juice       Being Active:  Being Active Assessed Today: No  Days per week of moderate to strenuous exercise (like a brisk walk): 0  On average, minutes per day of exercise at this level: 10  How intense was your typical exercise? : Light (like stretching or slow walking)  Exercise Minutes per Week: 0  Barrier to exercise: Physical limitation    Monitoring:  Monitoring Assessed Today: Yes  Did patient bring glucose meter to appointment? : No  Blood Glucose Meter: Other  Times checking blood sugar at home (number): Never  Blood  glucose trend: Other    Taking Medications:  Diabetes Medication(s)       Biguanides       metFORMIN (GLUCOPHAGE XR) 500 MG 24 hr tablet    Take 1 tablet (500 mg) by mouth daily (with dinner)            Taking Medication Assessed Today: Yes  Current Treatments: None, Oral Medication (taken by mouth)  Problems taking diabetes medications regularly?: No  Diabetes medication side effects?: No       Reducing Risks:  Diabetes Risks: Age over 45 years, Sedentary Lifestyle  CAD Risks: Sedentary lifestyle, Diabetes Mellitus, Male sex  Has dilated eye exam at least once a year?: Yes  Sees dentist every 6 months?: No  Feet checked by healthcare provider in the last year?: No    Healthy Coping:  Emotional response to diabetes: Ready to learn, Concern for health and well-being  Informal Support system:: Spouse  Stage of change: ACTION (Actively working towards change)  Support resources: None  Patient Activation Measure Survey Score:       No data to display                  Care Plan and Education Provided:  Care Plan: Diabetes   Updates made by Diane Myles RD since 10/2/2023 12:00 AM        Problem: HbA1C Not In Goal         Goal: Establish Regular Follow-Ups with PCP         Task: Discuss with PCP the recommended timing for patient's next follow up visit(s)    Responsible User: Diane Myles RD        Task: Discuss schedule for PCP visits with patient    Responsible User: Diane Myles RD        Goal: Get HbA1C Level in Goal         Task: Educate patient on diabetes education self-management topics    Responsible User: Diane Myles RD        Task: Educate patient on benefits of regular glucose monitoring    Responsible User: Diane Myles RD        Task: Refer patient to appropriate extended care team member, as needed (Medication Therapy Management, Behavioral Health, Physical Therapy, etc.)    Responsible User: Diane Myles RD        Task: Discuss diabetes treatment plan with patient    Responsible  User: Diane Myles RD        Problem: Diabetes Self-Management Education Needed to Optimize Self-Care Behaviors         Goal: Understand diabetes pathophysiology and disease progression         Task: Provide education on diabetes pathophysiology and disease progression specfic to patient's diabetes type Completed 10/2/2023   Responsible User: Diane Myles RD        Goal: Healthy Eating - follow a healthy eating pattern for diabetes    This Visit's Progress: 10%   Note:    I will add one protein food to breakfast daily.       Task: Provide education on portion control and consistency in amount, composition and timing of food intake Completed 10/2/2023   Responsible User: Diane Myles RD        Task: Provide education on managing carbohydrate intake (carbohydrate counting, plate planning method, etc.) Completed 10/2/2023   Responsible User: Diane Myles RD        Task: Provide education on weight management    Responsible User: Diane Myles RD        Task: Provide education on heart healthy eating Completed 10/2/2023   Responsible User: Diane Myles RD        Task: Provide education on eating out Completed 10/2/2023   Responsible User: Diane Myles RD        Task: Develop individualized healthy eating plan with patient Completed 10/2/2023   Responsible User: Diane Myles RD        Goal: Being Active - get regular physical activity, working up to at least 150 minutes per week         Task: Provide education on relationship of activity to glucose and precautions to take if at risk for low glucose    Responsible User: Diane Myles RD        Task: Discuss barriers to physical activity with patient    Responsible User: Diane Myles RD        Task: Develop physical activity plan with patient    Responsible User: Diaen Myles RD        Task: Explore community resources including walking groups, assistance programs, and home videos    Responsible User: Diane Myles RD         Goal: Monitoring - monitor glucose and ketones as directed    This Visit's Progress: 10%   Note:    I will test blood sugar 2 hours after eating.       Task: Provide education on blood glucose monitoring (purpose, proper technique, frequency, glucose targets, interpreting results, when to use glucose control solution, sharps disposal)    Responsible User: Diane Myles RD        Task: Provide education on continuous glucose monitoring (sensor placement, use of arron or /reader, understanding glucose trends, alerts and alarms, differences between sensor glucose and blood glucose)    Responsible User: Diane Myles RD        Task: Provide education on ketone monitoring (when to monitor, frequency, etc.)    Responsible User: Diane Myles RD        Goal: Taking Medication - patient is consistently taking medications as directed         Task: Provide education on action of prescribed medication, including when to take and possible side effects Completed 10/2/2023   Responsible User: Diane Myles RD        Task: Provide education on insulin and injectable diabetes medications, including administration, storage, site selection and rotation for injection sites    Responsible User: Diane Myles RD        Task: Discuss barriers to medication adherence with patient and provide management technique ideas as appropriate    Responsible User: Diane Myles RD        Task: Provide education on frequency and refill details of medications Completed 10/2/2023   Responsible User: Diane Myles RD        Goal: Problem Solving - know how to prevent and manage short-term diabetes complications         Task: Provide education on high blood glucose - causes, signs/symptoms, prevention and treatment    Responsible User: Diane Myles RD        Task: Provide education on low blood glucose - causes, signs/symptoms, prevention, treatment, carrying a carbohydrate source at all times, and medical  identification    Responsible User: Diane Myles RD        Task: Provide education on safe travel with diabetes    Responsible User: Diane Myles RD        Task: Provide education on how to care for diabetes on sick days    Responsible User: Diane Myles RD        Task: Provide education on when to call a health care provider    Responsible User: Diane Myles RD        Goal: Reducing Risks - know how to prevent and treat long-term diabetes complications         Task: Provide education on major complications of diabetes, prevention, early diagnostic measures and treatment of complications    Responsible User: Diane Myles RD        Task: Provide education on recommended care for dental, eye and foot health    Responsible User: Diane Myles RD        Task: Provide education on Hemoglobin A1c - goals and relationship to blood glucose levels    Responsible User: Diane Myles RD        Task: Provide education on recommendations for heart health - lipid levels and goals, blood pressure and goals, and aspirin therapy, if indicated    Responsible User: Diane Myles RD        Task: Provide education on tobacco cessation    Responsible User: Diane Myles RD        Goal: Healthy Coping - use available resources to cope with the challenges of managing diabetes         Task: Discuss recognizing feelings about having diabetes    Responsible User: Diane Myles RD        Task: Provide education on the benefits of making appropriate lifestyle changes    Responsible User: Diane Myles RD        Task: Provide education on benefits of utilizing support systems    Responsible User: Diane Myles RD        Task: Discuss methods for coping with stress    Responsible User: Diane Myles RD        Task: Provide education on when to seek professional counseling    Responsible User: Diane Myles RD Hilary Wilde MS, RD, LD, CDE  Time Spent: 60 minutes  Encounter Type:  Individual    Any diabetes medication dose changes were made via the CDE Protocol per the patient's primary care provider. A copy of this encounter was shared with the provider.

## 2023-10-04 ENCOUNTER — TRANSFERRED RECORDS (OUTPATIENT)
Dept: HEALTH INFORMATION MANAGEMENT | Facility: CLINIC | Age: 68
End: 2023-10-04
Payer: MEDICARE

## 2023-10-09 ENCOUNTER — TRANSFERRED RECORDS (OUTPATIENT)
Dept: HEALTH INFORMATION MANAGEMENT | Facility: CLINIC | Age: 68
End: 2023-10-09
Payer: MEDICARE

## 2023-10-10 ENCOUNTER — MYC MEDICAL ADVICE (OUTPATIENT)
Dept: FAMILY MEDICINE | Facility: CLINIC | Age: 68
End: 2023-10-10

## 2023-10-10 ENCOUNTER — OFFICE VISIT (OUTPATIENT)
Dept: AUDIOLOGY | Facility: CLINIC | Age: 68
End: 2023-10-10
Attending: NURSE PRACTITIONER
Payer: MEDICARE

## 2023-10-10 DIAGNOSIS — H90.3 SENSORINEURAL HEARING LOSS, ASYMMETRICAL: Primary | ICD-10-CM

## 2023-10-10 DIAGNOSIS — R60.0 BILATERAL LEG EDEMA: ICD-10-CM

## 2023-10-10 DIAGNOSIS — G62.9 PERIPHERAL POLYNEUROPATHY: Primary | ICD-10-CM

## 2023-10-10 PROCEDURE — 92557 COMPREHENSIVE HEARING TEST: CPT | Performed by: AUDIOLOGIST

## 2023-10-10 PROCEDURE — 92550 TYMPANOMETRY & REFLEX THRESH: CPT | Performed by: AUDIOLOGIST

## 2023-10-10 NOTE — PROGRESS NOTES
AUDIOLOGY REPORT    SUBJECTIVE:  Se Quinonez is a 68 year old male who was seen in the Audiology Clinic at the [facility] for audiologic evaluation, referred by Ashlye Akhtar C.N.P. .No previous audiograms are available at today's appointment.  The patient reports a gradual decrease in hearing. The patient denies  bilateral tinnitus, bilateral otalgia, and history of noise exposure.  The patient notes difficulty with communication in a variety of listening situations.  They were accompanied today by their self.    OBJECTIVE:  Abuse Screening:  Do you feel unsafe at home or work/school? No  Do you feel threatened by someone? No  Does anyone try to keep you from having contact with others, or doing things outside of your home? No  Physical signs of abuse present? No     Fall Risk Screen:  1. Have you fallen two or more times in the past year? Yes  2. Have you fallen and had an injury in the past year? Yes    Otoscopic exam indicates ears are clear of cerumen bilaterally     Pure Tone Thresholds assessed using conventional audiometry with good  reliability from 250-8000 Hz bilaterally using insert earphones and circumaural headphones     RIGHT:  normal sloping to mild and moderate-severe sensorineural hearing loss    LEFT:    normal sloping to mild, severe, and profound sensorineural hearing loss    Tympanogram:    RIGHT: normal eardrum mobility    LEFT:   normal eardrum mobility    Reflexes (reported by stimulus ear):  RIGHT: Ipsilateral is present at normal levels  RIGHT: Contralateral is CNT  LEFT:   Ipsilateral is CNT  LEFT:   Contralateral is absent at frequencies tested  *Unable to maintain seal with probe left    Speech Reception Threshold:    RIGHT: 30 dB HL    LEFT:   25 dB HL  Word Recognition Score:     RIGHT: 88% at 70 dB HL using NU-6 recorded word list.    LEFT:   80% at 70 dB HL using NU-6 recorded word list.      ASSESSMENT:     ICD-10-CM    1. Sensorineural hearing  loss, asymmetrical  H90.3           Today s results were discussed with the patient in detail. A copy of today's audiogram and hearing aid medical clearance form was given to the patient.    PLAN:  Patient was counseled regarding hearing loss and impact on communication.  Patient is a good candidate for amplification at this time. Handout on good communication strategies, and hearing aid use was given to patient. It is recommended that the patient be seen by an ENT specialist for medical evaluation of their ears and hearing evaluation.  .  Please call this clinic with questions regarding these results or recommendations.        Erin Lara M.A. -[facility], #[id number]

## 2023-10-10 NOTE — PATIENT INSTRUCTIONS
Please schedule an appointment for a hearing aid evaluation/consultation. You will be asked to schedule a series of 3 appointments: your hearing aid evaluation/consultation, a hearing aid fitting 3 weeks following the first appointment, and then return for an initial review appointment/hearing aid check 3 weeks following the hearing aid fitting. Scheduling this series of appointments does not mean you are required to purchase hearing aid(s).     Hearing aid evaluation/consultation (HAE/HAC): next available   Hearing aid fitting (HFP): 3 weeks after HAE/HAC  Initial review programming/hearing aid check (IRP): 3 weeks after HFP  
Negative

## 2023-10-11 NOTE — TELEPHONE ENCOUNTER
Ashley  Pt saw oncologist and it was recommended he see neurology for his foot and fingertip neuropathy. Pt is requesting referral.   Pt is also reporting foot and ankle edema x 1 mo. Oncologist is aware and has suggested he see the lymphatic clinic for massage.  Pt states edema is continuous. Denies sob. Pt did not have at your last appt so wanted you updated.       Magan Kowalski RN

## 2023-10-13 ENCOUNTER — MYC MEDICAL ADVICE (OUTPATIENT)
Dept: FAMILY MEDICINE | Facility: CLINIC | Age: 68
End: 2023-10-13
Payer: MEDICARE

## 2023-10-13 DIAGNOSIS — K62.89 PROCTITIS: ICD-10-CM

## 2023-10-13 DIAGNOSIS — R19.7 DIARRHEA, UNSPECIFIED TYPE: Primary | ICD-10-CM

## 2023-10-13 DIAGNOSIS — K52.9 NON-SPECIFIC COLITIS: ICD-10-CM

## 2023-10-13 DIAGNOSIS — K50.818 CROHN'S DISEASE OF BOTH SMALL AND LARGE INTESTINE WITH OTHER COMPLICATION (H): ICD-10-CM

## 2023-10-25 ENCOUNTER — TRANSFERRED RECORDS (OUTPATIENT)
Dept: HEALTH INFORMATION MANAGEMENT | Facility: CLINIC | Age: 68
End: 2023-10-25
Payer: MEDICARE

## 2023-10-26 ENCOUNTER — TRANSFERRED RECORDS (OUTPATIENT)
Dept: HEALTH INFORMATION MANAGEMENT | Facility: CLINIC | Age: 68
End: 2023-10-26
Payer: MEDICARE

## 2023-11-02 ENCOUNTER — TRANSFERRED RECORDS (OUTPATIENT)
Dept: HEALTH INFORMATION MANAGEMENT | Facility: CLINIC | Age: 68
End: 2023-11-02
Payer: MEDICARE

## 2023-11-02 ENCOUNTER — MYC MEDICAL ADVICE (OUTPATIENT)
Dept: FAMILY MEDICINE | Facility: CLINIC | Age: 68
End: 2023-11-02
Payer: MEDICARE

## 2023-11-03 ENCOUNTER — TRANSFERRED RECORDS (OUTPATIENT)
Dept: HEALTH INFORMATION MANAGEMENT | Facility: CLINIC | Age: 68
End: 2023-11-03
Payer: MEDICARE

## 2023-11-03 NOTE — TELEPHONE ENCOUNTER
Routing to PCP for review.   Patient mycharts labs from outside provider visit.     Radha Gregory RN

## 2023-11-06 ENCOUNTER — MYC MEDICAL ADVICE (OUTPATIENT)
Dept: FAMILY MEDICINE | Facility: CLINIC | Age: 68
End: 2023-11-06
Payer: MEDICARE

## 2023-11-06 DIAGNOSIS — M21.371 RIGHT FOOT DROP: Primary | ICD-10-CM

## 2023-11-06 NOTE — TELEPHONE ENCOUNTER
Routing to PCP for review. See patient MyChart message requesting AFO for Right foot drop per recommendations from PT at [facility].   DME order pended for consideration.     Radha Gregory RN

## 2023-11-08 ENCOUNTER — OFFICE VISIT (OUTPATIENT)
Dept: NEUROLOGY | Facility: CLINIC | Age: 68
End: 2023-11-08
Attending: NURSE PRACTITIONER
Payer: MEDICARE

## 2023-11-08 VITALS
OXYGEN SATURATION: 100 % | SYSTOLIC BLOOD PRESSURE: 123 MMHG | RESPIRATION RATE: 16 BRPM | DIASTOLIC BLOOD PRESSURE: 70 MMHG | HEART RATE: 98 BPM

## 2023-11-08 DIAGNOSIS — M21.371 FOOT DROP, RIGHT FOOT: ICD-10-CM

## 2023-11-08 DIAGNOSIS — C61 MALIGNANT NEOPLASM OF PROSTATE (H): ICD-10-CM

## 2023-11-08 DIAGNOSIS — G62.9 PERIPHERAL POLYNEUROPATHY: ICD-10-CM

## 2023-11-08 DIAGNOSIS — G54.1 RADIATION-INDUCED LUMBOSACRAL PLEXOPATHY: Primary | ICD-10-CM

## 2023-11-08 PROCEDURE — 99205 OFFICE O/P NEW HI 60 MIN: CPT | Performed by: STUDENT IN AN ORGANIZED HEALTH CARE EDUCATION/TRAINING PROGRAM

## 2023-11-08 RX ORDER — GABAPENTIN 100 MG/1
300 CAPSULE ORAL AT BEDTIME
Qty: 90 CAPSULE | Refills: 1 | Status: SHIPPED | OUTPATIENT
Start: 2023-11-08 | End: 2024-02-05

## 2023-11-08 NOTE — PATIENT INSTRUCTIONS
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

## 2023-11-08 NOTE — PROGRESS NOTES
CHIEF COMPLAINT / REASON FOR VISIT  Subacute progressive leg weakness and numbness    Referred by Dr. Akhtar    HISTORY OF PRESENT ILLNESS   is a 68 year old male presenting [facility] for evaluation of subacute progressive leg weakness and numbness. Patient is accompanied by his wife today.    His past medical history significant for   - high risk stage Ivb (T2 N1 M1) metastatic hormone sensitive prostate adenocarcinoma with bone and diffuse antionette metastasis below and above the diaphragm s/p chemo-hormonal treatment (Docetaxel, last Jan 2023), radiation in March-April 2023, on androgen deprivation therapy + abiraterone/prednisone + Olaparib.   -BRCA1 mutation  -Crohn's disease: not on disease modifying therapy  -Diabetes mellitus, recently diagnosed in September 2023, on metformin, HbA1c 6.6%    He did not have any numbness or weakness in the legs prior to undergoing treatment for prostate cancer.  He started to notice numbness and tingling sensation in the feet that started around the time that he was getting chemotherapy (Docetaxel).  Symptoms were mild and would go away in between the chemotherapy cycle. However, the symptoms did not go away after the last cycle in January 2023.     Around May 2023, the numbness started to become more noticeable and bothersome.  He started to have occasional tingling and shooting sensation in the feet.  Symptoms continue to be bilateral but much more severe on the right side.  A few months later, he started to notice weakness in bilateral ankles, worse on the right.  The weakness gradually progressed to the point that he had to start using a cane.  He directed his right foot while he walks and has tripped on the right foot and fallen 3 times in the past couple of months.  The numbness also progressed up from the feet to distal legs.  He denies any back pain or radiating pain.  He reports mechanical hip pain.  He has been getting physical therapy but  feels that weakness and numbness continue to progress.  No symptoms in the hands.    -PET CT prostate 9/14/2023: Partial response to therapy with the residual radiotracer uptake in the right aspect of the prostate gland and subcentimeter left axillary lymph nodes  -Hemoglobin 12.4,   -Recent CMP unremarkable    REVIEW OF SYSTEMS  All negative except for what indicated in the HPI. The following portions of the patient's history were reviewed and updated as appropriate: allergies, current medications, family history, medical history, surgical history, social history, and problem list.     PAST MEDICAL/SURGICAL HISTORY   Past Medical History:   Diagnosis Date    Arthritis 04/05/2023    Rheumatoid factor confirmed    Cancer (H) 09/2923    Prostate    Crohn disease (H)     Diabetes (H) 08/21/2023    Herpes zoster     Created by Conversion  Replacement Utility updated for latest IMO load    Postherpetic polyneuropathy     Created by Conversion      Past Surgical History:   Procedure Laterality Date    COLONOSCOPY      ENT SURGERY  08/08/2023    partial right nasal polyp removal    EYE SURGERY      OTHER SURGICAL HISTORY      tonsillectomy        MEDICATIONS    Current Outpatient Medications:     abiraterone (ZYTIGA) 250 MG tablet, Take 1,000 mg by mouth daily Taking 2 250mg daily, Disp: , Rfl:     blood glucose (NO BRAND SPECIFIED) test strip, Use to test blood sugar one time daily or as directed. To accompany: Blood Glucose Monitor Brands: per insurance., Disp: 100 strip, Rfl: 6    blood glucose monitoring (NO BRAND SPECIFIED) meter device kit, Use to test blood sugar one time daily or as directed. Preferred blood glucose meter OR supplies to accompany: Blood Glucose Monitor Brands: per insurance., Disp: 1 kit, Rfl: 0    famotidine (PEPCID) 20 MG tablet, Take 20 mg by mouth 2 times daily, Disp: , Rfl:     leuprolide (LUPRON DEPOT) 22.5 MG kit, Inject 22.5 mg into the muscle every 3 months, Disp: , Rfl:      metFORMIN (GLUCOPHAGE XR) 500 MG 24 hr tablet, Take 1 tablet (500 mg) by mouth daily (with dinner), Disp: 90 tablet, Rfl: 1    olaparib (LYNPARZA) 150 MG tablet, , Disp: , Rfl:     predniSONE (DELTASONE) 5 MG tablet, Take 1 tablet (5 mg) by mouth 2 times daily (Patient taking differently: Take 5 mg by mouth daily), Disp: , Rfl:     tamsulosin (FLOMAX) 0.4 MG capsule, Take 1 capsule (0.4 mg) by mouth daily, Disp: 90 capsule, Rfl: 3    thin (NO BRAND SPECIFIED) lancets, Use with lanceting device. To accompany: Blood Glucose Monitor Brands: per insurance., Disp: 100 each, Rfl: 6    ALLERGIES:  No Known Allergies    PHYSICAL EXAM  There were no vitals taken for this visit.    NEUROLOGICAL EXAMINATION  Mental status: normal.  Speech: normal.  High arched feet: Absent  Hammertoes: Absent  Coordination: normal rapid alternating movements and finger to nose testing  Gait: Steppage gait bilaterally, right worse than left.  Able to stand on toes but unable to stand on heels bilaterally  Getting up from seated position without pushing on chair: yes.  Posture: normal.  Romberg: negative.    The Neuropathy Impairment Scoring System (NIS) strength scale was utilized.    0=normal; -1=25% weak; -2=50% weak; -3=75% weak; -3.25=movement against gravity;   -3.5=movement, gravity eliminated;-3.75=minimal contraction; -4= paralysis.  Cranial nerves   R Muscle strength L  R  L   0 Frontalis 0   Visual acuity    0 Orbicularis oculi 0  3 mm Pupils 3 mm   0 Lower facial muscles 0  0 Light reflex 0   0 Temporal-[place]eter 0  0 Ptosis 0   0 Palate-Pharynx 0  0 Extraocular muscles 0   0 Sternocleidomastoid 0       0 Trapezius 0   Hearing    0 Genioglossus 0              R Muscle, strength L  R Muscle, strength L    Neck     Trunk    0 Neck flexors 0   Abdominal muscles    0 Neck extensors 0   Paraspinals     Upper Limbs    Lower Limbs    0 Supraspinatus 0  -1 Iliopsoas 0   0 Infraspinatus 0  0 Adductors, thigh 0   0 Pectoralis 0  0 Gluteus  medius 0   0 Deltoid 0  0 Gluteus max 0   0 Biceps brachii 0  -1 Quadriceps 0   0 Brachioradialis 0  -2 Hamstrings 0    Supinator/pronator   -3.5 Tibialis anterior -3   0 Triceps 0  -3.5 Peronei -2   0 Wrist extensor 0  -3.75 EHL -3.5   0 Wrist flexors 0  -3.75 Toe extensors -3.5   0 Digit extensors 0  -3.5 Tibialis post. 0   0 Digit flexors 0  -2 Toe flexors 0   0 Thenar 0  -1 Calf muscles 0   0 Hypothenar 0       0 Interossei 0            R Reflexes L  R Sensation L   For NIS:  Normal=0 Reduced=1 Absent=2     0 Biceps brachii 0   Finger index    0 Brachioradialis 0  0 Cold 0   0 Triceps 0  0 Pinprick 0   0 Feliz 0  0 Vibration 0   -1 Quadriceps 0  0 Joint position 0   -4 Gastroc-soleus -4   Toes (Great)    0 Clonus (ankle) 0  -4 Cold -1   Flexor Plantar response Flexor  -3 Pinprick -2     -4 Vibration -2     -3 Joint position -2          ASSESSMENT / PLAN  #1 Subacute progressive asymmetric weakness and numbness in the lower limbs: 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      In summary, Mr. Quinonez is a 68 year old male presenting with progressive asymmetric (R>L) leg weakness and numbness that started around the time he was receiving chemotherapy but significantly progressed in the past few months.  Exam today showed asymmetric severe weakness in the left peroneal-innervated muscles and right sciatic (more severe in peroneal than tibial) innervated muscles and mild weakness in right quadriceps and iliopsoas. There was also decreased sensation to all modalities in the distal legs R>L, absent  bilateral ankle reflexes, and mildly reduced right patella reflex. Clinical presentation and exam findings are localized to patchy involvement of bilateral lumbosacral plexus/roots. The temporal relation of symptom onset to radiation therapy could fit with radiation-induced lumbosacral plexopathy. Differential diagnosis include malignant carcinomatosis or inflammatory/immune-mediated process involving the plexus, however the lack of pain would be atypical for these.  Lastly, there may also be a component of chemotherapy (docetaxel) induced peripheral neuropathy but this is typically length-dependent/symmetric in nature and the degree of bilateral foot drop could not be entirely explained by chemotherapy effect alone.     After discussion with Mr. Quinonez, we have agreed to proceed with the following investigations and management:    -MRI lumbosacral plexus with and without gadolinium to look for abnormal enhancement or nerve enlargement  -EMG/nerve conduction studies to further characterize and evaluate the severity  -Labs for CRP, AMAURY, ANCA, SSA, SSB, rheumatoid factor, autoimmune axonal panel, SPEP immunofixation  -I recommend that he continue with physical therapy to maintain strength of proximal leg muscles  -He would benefit from AFO braces, especially on the right side  -He can try low-dose gabapentin at nighttime to help with tingling/shooting pain in the bottom of feet.  I recommend that he start with gabapentin 100 mg at nighttime then slowly increase by 100 mg every week to a goal dose of 300 mg at nighttime.  -F/U in 6 months.    Multiple questions were answered. I spent a total of 60 minutes on the day of the visit for chart review, face-to-face visit, counseling/coordination of care, and documentation. Please see the note for further information on patient assessment and treatment.       PATIENT EDUCATION  Ready to learn, no apparent learning barriers were identified; learning preferences include  listening.  Explained diagnosis and treatment plan; patient expressed understanding of the content.    ADDENDUM   MRI LS plexus did not show any abnormal enhancement or infiltration of the lumbosacral plexus. This suggests that there is no active inflammation on the nerve that warrants immunosuppressive therapy. There is also no evidence of cancer involvement on the plexus. 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 I recommend that he continue with physical therapy at this time.     ADDENDUM  Patient needs custom AFO for triplanar control to use during ambulation to provide stability and prevent falls from drop foot.   A prescription for right custom AFO was placed.

## 2023-11-08 NOTE — LETTER
11/8/2023         RE: Se Quinonez  [address] [address]        Dear Colleague,    Thank you for referring your patient, Se Quinonez, to the [facility] [place]. Please see a copy of my visit note below.    CHIEF COMPLAINT / REASON FOR VISIT  Subacute progressive leg weakness and numbness    Referred by Dr. Akhtar    HISTORY OF PRESENT ILLNESS   is a 68 year old male presenting [facility] for evaluation of subacute progressive leg weakness and numbness. Patient is accompanied by his wife today.    His past medical history significant for   - high risk stage Ivb (T2 N1 M1) metastatic hormone sensitive prostate adenocarcinoma with bone and diffuse antionette metastasis below and above the diaphragm s/p chemo-hormonal treatment (Docetaxel, last Jan 2023), radiation in March-April 2023, on androgen deprivation therapy + abiraterone/prednisone + Olaparib.   -BRCA1 mutation  -Crohn's disease: not on disease modifying therapy  -Diabetes mellitus, recently diagnosed in September 2023, on metformin, HbA1c 6.6%    He did not have any numbness or weakness in the legs prior to undergoing treatment for prostate cancer.  He started to notice numbness and tingling sensation in the feet that started around the time that he was getting chemotherapy (Docetaxel).  Symptoms were mild and would go away in between the chemotherapy cycle. However, the symptoms did not go away after the last cycle in January 2023.     Around May 2023, the numbness started to become more noticeable and bothersome.  He started to have occasional tingling and shooting sensation in the feet.  Symptoms continue to be bilateral but much more severe on the right side.  A few months later, he started to notice weakness in bilateral ankles, worse on the right.  The weakness gradually progressed to the point that he had to start using a cane.  He directed his right foot while he walks and has  tripped on the right foot and fallen 3 times in the past couple of months.  The numbness also progressed up from the feet to distal legs.  He denies any back pain or radiating pain.  He reports mechanical hip pain.  He has been getting physical therapy but feels that weakness and numbness continue to progress.  No symptoms in the hands.    -PET CT prostate 9/14/2023: Partial response to therapy with the residual radiotracer uptake in the right aspect of the prostate gland and subcentimeter left axillary lymph nodes  -Hemoglobin 12.4,   -Recent CMP unremarkable    REVIEW OF SYSTEMS  All negative except for what indicated in the HPI. The following portions of the patient's history were reviewed and updated as appropriate: allergies, current medications, family history, medical history, surgical history, social history, and problem list.     PAST MEDICAL/SURGICAL HISTORY   Past Medical History:   Diagnosis Date     Arthritis 04/05/2023    Rheumatoid factor confirmed     Cancer (H) 09/2923    Prostate     Crohn disease (H)      Diabetes (H) 08/21/2023     Herpes zoster     Created by Conversion  Replacement Utility updated for latest IMO load     Postherpetic polyneuropathy     Created by Conversion      Past Surgical History:   Procedure Laterality Date     COLONOSCOPY       ENT SURGERY  08/08/2023    partial right nasal polyp removal     EYE SURGERY       OTHER SURGICAL HISTORY      tonsillectomy        MEDICATIONS    Current Outpatient Medications:      abiraterone (ZYTIGA) 250 MG tablet, Take 1,000 mg by mouth daily Taking 2 250mg daily, Disp: , Rfl:      blood glucose (NO BRAND SPECIFIED) test strip, Use to test blood sugar one time daily or as directed. To accompany: Blood Glucose Monitor Brands: per insurance., Disp: 100 strip, Rfl: 6     blood glucose monitoring (NO BRAND SPECIFIED) meter device kit, Use to test blood sugar one time daily or as directed. Preferred blood glucose meter OR supplies to  accompany: Blood Glucose Monitor Brands: per insurance., Disp: 1 kit, Rfl: 0     famotidine (PEPCID) 20 MG tablet, Take 20 mg by mouth 2 times daily, Disp: , Rfl:      leuprolide (LUPRON DEPOT) 22.5 MG kit, Inject 22.5 mg into the muscle every 3 months, Disp: , Rfl:      metFORMIN (GLUCOPHAGE XR) 500 MG 24 hr tablet, Take 1 tablet (500 mg) by mouth daily (with dinner), Disp: 90 tablet, Rfl: 1     olaparib (LYNPARZA) 150 MG tablet, , Disp: , Rfl:      predniSONE (DELTASONE) 5 MG tablet, Take 1 tablet (5 mg) by mouth 2 times daily (Patient taking differently: Take 5 mg by mouth daily), Disp: , Rfl:      tamsulosin (FLOMAX) 0.4 MG capsule, Take 1 capsule (0.4 mg) by mouth daily, Disp: 90 capsule, Rfl: 3     thin (NO BRAND SPECIFIED) lancets, Use with lanceting device. To accompany: Blood Glucose Monitor Brands: per insurance., Disp: 100 each, Rfl: 6    ALLERGIES:  No Known Allergies    PHYSICAL EXAM  There were no vitals taken for this visit.    NEUROLOGICAL EXAMINATION  Mental status: normal.  Speech: normal.  High arched feet: Absent  Hammertoes: Absent  Coordination: normal rapid alternating movements and finger to nose testing  Gait: Steppage gait bilaterally, right worse than left.  Able to stand on toes but unable to stand on heels bilaterally  Getting up from seated position without pushing on chair: yes.  Posture: normal.  Romberg: negative.    The Neuropathy Impairment Scoring System (NIS) strength scale was utilized.    0=normal; -1=25% weak; -2=50% weak; -3=75% weak; -3.25=movement against gravity;   -3.5=movement, gravity eliminated;-3.75=minimal contraction; -4= paralysis.  Cranial nerves   R Muscle strength L  R  L   0 Frontalis 0   Visual acuity    0 Orbicularis oculi 0  3 mm Pupils 3 mm   0 Lower facial muscles 0  0 Light reflex 0   0 Temporal-[place]eter 0  0 Ptosis 0   0 Palate-Pharynx 0  0 Extraocular muscles 0   0 Sternocleidomastoid 0       0 Trapezius 0   Hearing    0 Genioglossus 0              R  Muscle, strength L  R Muscle, strength L    Neck     Trunk    0 Neck flexors 0   Abdominal muscles    0 Neck extensors 0   Paraspinals     Upper Limbs    Lower Limbs    0 Supraspinatus 0  -1 Iliopsoas 0   0 Infraspinatus 0  0 Adductors, thigh 0   0 Pectoralis 0  0 Gluteus medius 0   0 Deltoid 0  0 Gluteus max 0   0 Biceps brachii 0  -1 Quadriceps 0   0 Brachioradialis 0  -2 Hamstrings 0    Supinator/pronator   -3.5 Tibialis anterior -3   0 Triceps 0  -3.5 Peronei -2   0 Wrist extensor 0  -3.75 EHL -3.5   0 Wrist flexors 0  -3.75 Toe extensors -3.5   0 Digit extensors 0  -3.5 Tibialis post. 0   0 Digit flexors 0  -2 Toe flexors 0   0 Thenar 0  -1 Calf muscles 0   0 Hypothenar 0       0 Interossei 0            R Reflexes L  R Sensation L   For NIS:  Normal=0 Reduced=1 Absent=2     0 Biceps brachii 0   Finger index    0 Brachioradialis 0  0 Cold 0   0 Triceps 0  0 Pinprick 0   0 Feliz 0  0 Vibration 0   -1 Quadriceps 0  0 Joint position 0   -4 Gastroc-soleus -4   Toes (Great)    0 Clonus (ankle) 0  -4 Cold -1   Flexor Plantar response Flexor  -3 Pinprick -2     -4 Vibration -2     -3 Joint position -2          ASSESSMENT / PLAN  #1 Subacute progressive asymmetric weakness and numbness in the lower limbs: 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      In summary, Mr. Quinonez is a 68 year old male presenting with progressive asymmetric (R>L) leg weakness and numbness that started around the time he was receiving chemotherapy but significantly progressed in the past few months.  Exam today showed asymmetric  severe weakness in the left peroneal-innervated muscles and right sciatic (more severe in peroneal than tibial) innervated muscles and mild weakness in right quadriceps and iliopsoas. There was also decreased sensation to all modalities in the distal legs R>L, absent bilateral ankle reflexes, and mildly reduced right patella reflex. Clinical presentation and exam findings are localized to patchy involvement of bilateral lumbosacral plexus/roots. The temporal relation of symptom onset to radiation therapy could fit with radiation-induced lumbosacral plexopathy. Differential diagnosis include malignant carcinomatosis or inflammatory/immune-mediated process involving the plexus, however the lack of pain would be atypical for these.  Lastly, there may also be a component of chemotherapy (docetaxel) induced peripheral neuropathy but this is typically length-dependent/symmetric in nature and the degree of bilateral foot drop could not be entirely explained by chemotherapy effect alone.     After discussion with Mr. Quinonez, we have agreed to proceed with the following investigations and management:    -MRI lumbosacral plexus with and without gadolinium to look for abnormal enhancement or nerve enlargement  -EMG/nerve conduction studies to further characterize and evaluate the severity  -Labs for CRP, AMAURY, ANCA, SSA, SSB, rheumatoid factor, autoimmune axonal panel, SPEP immunofixation  -I recommend that he continue with physical therapy to maintain strength of proximal leg muscles  -He would benefit from AFO braces, especially on the right side  -He can try low-dose gabapentin at nighttime to help with tingling/shooting pain in the bottom of feet.  I recommend that he start with gabapentin 100 mg at nighttime then slowly increase by 100 mg every week to a goal dose of 300 mg at nighttime.  -F/U in 6 months.    Multiple questions were answered. I spent a total of 60 minutes on the day of the visit for chart review,  face-to-face visit, counseling/coordination of care, and documentation. Please see the note for further information on patient assessment and treatment.       PATIENT EDUCATION  Ready to learn, no apparent learning barriers were identified; learning preferences include listening.  Explained diagnosis and treatment plan; patient expressed understanding of the content.      Again, thank you for allowing me to participate in the care of your patient.        Sincerely,        Jahaira Haas MD

## 2023-11-15 ENCOUNTER — TRANSFERRED RECORDS (OUTPATIENT)
Dept: HEALTH INFORMATION MANAGEMENT | Facility: CLINIC | Age: 68
End: 2023-11-15
Payer: MEDICARE

## 2023-11-17 ENCOUNTER — TELEPHONE (OUTPATIENT)
Dept: FAMILY MEDICINE | Facility: CLINIC | Age: 68
End: 2023-11-17
Payer: MEDICARE

## 2023-11-17 NOTE — TELEPHONE ENCOUNTER
Nisa OT with [facility] calling & states she sent a request for a Rx for compression socks on 11/13 for BLE knee high socks.  KAIN morrison found fax & placed on Provider of Day's (Dr Her) desk as PCP is out until Wednesday 11/22.  Called Nisa to update that we are working on request & waiting for provider to sign.    Kimi Lackey RN

## 2023-11-19 ENCOUNTER — HOSPITAL ENCOUNTER (OUTPATIENT)
Dept: MRI IMAGING | Facility: CLINIC | Age: 68
Discharge: HOME | End: 2023-11-19
Attending: STUDENT IN AN ORGANIZED HEALTH CARE EDUCATION/TRAINING PROGRAM | Admitting: STUDENT IN AN ORGANIZED HEALTH CARE EDUCATION/TRAINING PROGRAM
Payer: MEDICARE

## 2023-11-19 DIAGNOSIS — G54.1 RADIATION-INDUCED LUMBOSACRAL PLEXOPATHY: ICD-10-CM

## 2023-11-19 DIAGNOSIS — C61 MALIGNANT NEOPLASM OF PROSTATE (H): ICD-10-CM

## 2023-11-19 PROCEDURE — 255N000002 HC RX 255 OP 636: Mod: JZ | Performed by: STUDENT IN AN ORGANIZED HEALTH CARE EDUCATION/TRAINING PROGRAM

## 2023-11-19 PROCEDURE — 72158 MRI LUMBAR SPINE W/O & W/DYE: CPT | Mod: MG

## 2023-11-19 PROCEDURE — A9585 GADOBUTROL INJECTION: HCPCS | Mod: JZ | Performed by: STUDENT IN AN ORGANIZED HEALTH CARE EDUCATION/TRAINING PROGRAM

## 2023-11-19 RX ORDER — GADOBUTROL 604.72 MG/ML
8 INJECTION INTRAVENOUS ONCE
Status: COMPLETED | OUTPATIENT
Start: 2023-11-19 | End: 2023-11-19

## 2023-11-19 RX ADMIN — GADOBUTROL 8 ML: 604.72 INJECTION INTRAVENOUS at 13:42

## 2023-11-20 ENCOUNTER — MYC REFILL (OUTPATIENT)
Dept: FAMILY MEDICINE | Facility: CLINIC | Age: 68
End: 2023-11-20
Payer: MEDICARE

## 2023-11-20 DIAGNOSIS — E11.9 TYPE 2 DIABETES MELLITUS WITHOUT COMPLICATION, WITHOUT LONG-TERM CURRENT USE OF INSULIN (H): ICD-10-CM

## 2023-11-20 RX ORDER — LANCETS
EACH MISCELLANEOUS
Qty: 100 EACH | Refills: 6 | OUTPATIENT
Start: 2023-11-20

## 2023-11-21 ENCOUNTER — MYC MEDICAL ADVICE (OUTPATIENT)
Dept: FAMILY MEDICINE | Facility: CLINIC | Age: 68
End: 2023-11-21
Payer: MEDICARE

## 2023-11-22 ENCOUNTER — TELEPHONE (OUTPATIENT)
Dept: NEUROLOGY | Facility: CLINIC | Age: 68
End: 2023-11-22
Payer: MEDICARE

## 2023-11-22 DIAGNOSIS — M21.379 FOOT DROP: ICD-10-CM

## 2023-11-22 DIAGNOSIS — G62.9 PERIPHERAL POLYNEUROPATHY: Primary | ICD-10-CM

## 2023-11-22 NOTE — TELEPHONE ENCOUNTER
[facility] Call Center    Phone Message    May a detailed message be left on voicemail: yes     Reason for Call:     Thuy from [facility] called requesting for a AFO brace order, please fax to [phone]    Action Taken:  [facility]    Travel Screening: Not Applicable

## 2023-11-27 ENCOUNTER — MYC MEDICAL ADVICE (OUTPATIENT)
Dept: FAMILY MEDICINE | Facility: CLINIC | Age: 68
End: 2023-11-27

## 2023-11-27 ENCOUNTER — LAB (OUTPATIENT)
Dept: LAB | Facility: CLINIC | Age: 68
End: 2023-11-27
Payer: MEDICARE

## 2023-11-27 ENCOUNTER — MYC REFILL (OUTPATIENT)
Dept: FAMILY MEDICINE | Facility: CLINIC | Age: 68
End: 2023-11-27

## 2023-11-27 DIAGNOSIS — R35.1 NOCTURIA: ICD-10-CM

## 2023-11-27 DIAGNOSIS — T38.0X5A STEROID-INDUCED DIABETES MELLITUS, INITIAL ENCOUNTER (H): ICD-10-CM

## 2023-11-27 DIAGNOSIS — G54.1 RADIATION-INDUCED LUMBOSACRAL PLEXOPATHY: ICD-10-CM

## 2023-11-27 DIAGNOSIS — E09.9 STEROID-INDUCED DIABETES MELLITUS, INITIAL ENCOUNTER (H): ICD-10-CM

## 2023-11-27 DIAGNOSIS — E11.9 TYPE 2 DIABETES MELLITUS WITHOUT COMPLICATION, WITHOUT LONG-TERM CURRENT USE OF INSULIN (H): ICD-10-CM

## 2023-11-27 DIAGNOSIS — I89.0 LYMPHEDEMA: Primary | ICD-10-CM

## 2023-11-27 LAB
CHOLEST SERPL-MCNC: 175 MG/DL
CRP SERPL-MCNC: 7.07 MG/L
HBA1C MFR BLD: 5.1 % (ref 0–5.6)
HDLC SERPL-MCNC: 59 MG/DL
LDLC SERPL CALC-MCNC: 98 MG/DL
Lab: NORMAL
NONHDLC SERPL-MCNC: 116 MG/DL
PERFORMING LABORATORY: NORMAL
SPECIMEN STATUS: NORMAL
TEST NAME: NORMAL
TRIGL SERPL-MCNC: 88 MG/DL

## 2023-11-27 PROCEDURE — 83036 HEMOGLOBIN GLYCOSYLATED A1C: CPT

## 2023-11-27 PROCEDURE — 84165 PROTEIN E-PHORESIS SERUM: CPT | Performed by: PATHOLOGY

## 2023-11-27 PROCEDURE — 86235 NUCLEAR ANTIGEN ANTIBODY: CPT

## 2023-11-27 PROCEDURE — 86140 C-REACTIVE PROTEIN: CPT

## 2023-11-27 PROCEDURE — 86037 ANCA TITER EACH ANTIBODY: CPT

## 2023-11-27 PROCEDURE — 84155 ASSAY OF PROTEIN SERUM: CPT

## 2023-11-27 PROCEDURE — 80061 LIPID PANEL: CPT

## 2023-11-27 PROCEDURE — 84182 PROTEIN WESTERN BLOT TEST: CPT

## 2023-11-27 PROCEDURE — 86431 RHEUMATOID FACTOR QUANT: CPT

## 2023-11-27 PROCEDURE — 36415 COLL VENOUS BLD VENIPUNCTURE: CPT

## 2023-11-27 PROCEDURE — 86038 ANTINUCLEAR ANTIBODIES: CPT

## 2023-11-27 PROCEDURE — 86334 IMMUNOFIX E-PHORESIS SERUM: CPT | Performed by: PATHOLOGY

## 2023-11-27 PROCEDURE — 86036 ANCA SCREEN EACH ANTIBODY: CPT

## 2023-11-27 PROCEDURE — 86255 FLUORESCENT ANTIBODY SCREEN: CPT

## 2023-11-27 NOTE — TELEPHONE ENCOUNTER
Ashley  Pt responds to result note message with his average bs levels over last month      Magan Kowalski RN

## 2023-11-27 NOTE — TELEPHONE ENCOUNTER
Orders sent to [facility], [phone]. Patient notified by MyChart. Britni Sethi on 11/27/2023 at 9:28 AM

## 2023-11-28 LAB
ALBUMIN SERPL ELPH-MCNC: 3.8 G/DL (ref 3.7–5.1)
ALPHA1 GLOB SERPL ELPH-MCNC: 0.3 G/DL (ref 0.2–0.4)
ALPHA2 GLOB SERPL ELPH-MCNC: 0.8 G/DL (ref 0.5–0.9)
ANA SER QL IF: NEGATIVE
ANCA AB PATTERN SER IF-IMP: ABNORMAL
B-GLOBULIN SERPL ELPH-MCNC: 0.8 G/DL (ref 0.6–1)
C-ANCA TITR SER IF: ABNORMAL {TITER}
ENA SS-A AB SER IA-ACNC: <0.5 U/ML
ENA SS-A AB SER IA-ACNC: NEGATIVE
ENA SS-B IGG SER IA-ACNC: <0.6 U/ML
ENA SS-B IGG SER IA-ACNC: NEGATIVE
GAMMA GLOB SERPL ELPH-MCNC: 0.7 G/DL (ref 0.7–1.6)
M PROTEIN SERPL ELPH-MCNC: 0 G/DL
PROT PATTERN SERPL ELPH-IMP: NORMAL
PROT PATTERN SERPL IFE-IMP: NORMAL
RHEUMATOID FACT SERPL-ACNC: 12 IU/ML
TOTAL PROTEIN SERUM FOR ELP: 6.4 G/DL (ref 6.4–8.3)

## 2023-11-28 RX ORDER — METFORMIN HCL 500 MG
500 TABLET, EXTENDED RELEASE 24 HR ORAL
Qty: 90 TABLET | Refills: 1 | Status: SHIPPED | OUTPATIENT
Start: 2023-11-28 | End: 2024-05-28

## 2023-11-28 RX ORDER — TAMSULOSIN HYDROCHLORIDE 0.4 MG/1
0.4 CAPSULE ORAL DAILY
Qty: 90 CAPSULE | Refills: 3 | Status: SHIPPED | OUTPATIENT
Start: 2023-11-28 | End: 2024-04-15

## 2023-12-03 ENCOUNTER — MYC MEDICAL ADVICE (OUTPATIENT)
Dept: FAMILY MEDICINE | Facility: CLINIC | Age: 68
End: 2023-12-03
Payer: MEDICARE

## 2023-12-03 DIAGNOSIS — E11.9 TYPE 2 DIABETES MELLITUS WITHOUT COMPLICATION, WITHOUT LONG-TERM CURRENT USE OF INSULIN (H): Primary | ICD-10-CM

## 2023-12-04 ENCOUNTER — TRANSFERRED RECORDS (OUTPATIENT)
Dept: HEALTH INFORMATION MANAGEMENT | Facility: CLINIC | Age: 68
End: 2023-12-04
Payer: MEDICARE

## 2023-12-05 NOTE — TELEPHONE ENCOUNTER
Scheduled remaining lab appt and called pt. Pt not able to make  that appt. Cancelled. Pt will call [facility] to see if he can get in. Transferred.       Magan Kowalski RN

## 2023-12-05 NOTE — TELEPHONE ENCOUNTER
Please schedule him for lab appt today for BMP panel    FRANKO Kingston CNP    Spoke with pt and she states Dr. Montanez did some lab work and her glucose was high, 226. Pt wants to know if that is from the prednisone or something else. Please advise.

## 2023-12-06 ENCOUNTER — LAB (OUTPATIENT)
Dept: LAB | Facility: CLINIC | Age: 68
End: 2023-12-06
Payer: MEDICARE

## 2023-12-06 ENCOUNTER — OFFICE VISIT (OUTPATIENT)
Dept: FAMILY MEDICINE | Facility: CLINIC | Age: 68
End: 2023-12-06
Payer: MEDICARE

## 2023-12-06 VITALS
SYSTOLIC BLOOD PRESSURE: 108 MMHG | WEIGHT: 174 LBS | HEART RATE: 89 BPM | TEMPERATURE: 98.2 F | BODY MASS INDEX: 28.08 KG/M2 | DIASTOLIC BLOOD PRESSURE: 68 MMHG | OXYGEN SATURATION: 99 % | RESPIRATION RATE: 16 BRPM

## 2023-12-06 DIAGNOSIS — R60.0 EDEMA OF BOTH LEGS: ICD-10-CM

## 2023-12-06 DIAGNOSIS — E11.9 TYPE 2 DIABETES MELLITUS WITHOUT COMPLICATION, WITHOUT LONG-TERM CURRENT USE OF INSULIN (H): Primary | ICD-10-CM

## 2023-12-06 DIAGNOSIS — E11.9 TYPE 2 DIABETES MELLITUS WITHOUT COMPLICATION, WITHOUT LONG-TERM CURRENT USE OF INSULIN (H): ICD-10-CM

## 2023-12-06 DIAGNOSIS — C79.51 MALIGNANT NEOPLASM METASTATIC TO BONE (H): ICD-10-CM

## 2023-12-06 DIAGNOSIS — C77.9 MALIGNANT NEOPLASM METASTATIC TO LYMPH NODES, UNSPECIFIED LYMPH NODE REGION (H): ICD-10-CM

## 2023-12-06 LAB
ALBUMIN SERPL BCG-MCNC: 4 G/DL (ref 3.5–5.2)
ALP SERPL-CCNC: 127 U/L (ref 40–150)
ALT SERPL W P-5'-P-CCNC: 14 U/L (ref 0–70)
ANION GAP SERPL CALCULATED.3IONS-SCNC: 8 MMOL/L (ref 7–15)
AST SERPL W P-5'-P-CCNC: 23 U/L (ref 0–45)
BILIRUB DIRECT SERPL-MCNC: <0.2 MG/DL (ref 0–0.3)
BILIRUB SERPL-MCNC: 0.9 MG/DL
BUN SERPL-MCNC: 12.5 MG/DL (ref 8–23)
CALCIUM SERPL-MCNC: 9.1 MG/DL (ref 8.8–10.2)
CHLORIDE SERPL-SCNC: 103 MMOL/L (ref 98–107)
CREAT SERPL-MCNC: 0.74 MG/DL (ref 0.67–1.17)
CREAT UR-MCNC: 167.9 MG/DL
DEPRECATED HCO3 PLAS-SCNC: 27 MMOL/L (ref 22–29)
EGFRCR SERPLBLD CKD-EPI 2021: >90 ML/MIN/1.73M2
GLUCOSE SERPL-MCNC: 109 MG/DL (ref 70–99)
MAYO MISC RESULT: NORMAL
MICROALBUMIN UR-MCNC: <12 MG/L
MICROALBUMIN/CREAT UR: NORMAL MG/G{CREAT}
POTASSIUM SERPL-SCNC: 4.1 MMOL/L (ref 3.4–5.3)
PROT SERPL-MCNC: 6.7 G/DL (ref 6.4–8.3)
SODIUM SERPL-SCNC: 138 MMOL/L (ref 135–145)

## 2023-12-06 PROCEDURE — 36415 COLL VENOUS BLD VENIPUNCTURE: CPT

## 2023-12-06 PROCEDURE — 82570 ASSAY OF URINE CREATININE: CPT | Performed by: NURSE PRACTITIONER

## 2023-12-06 PROCEDURE — 82248 BILIRUBIN DIRECT: CPT

## 2023-12-06 PROCEDURE — 80053 COMPREHEN METABOLIC PANEL: CPT

## 2023-12-06 PROCEDURE — 99214 OFFICE O/P EST MOD 30 MIN: CPT | Performed by: NURSE PRACTITIONER

## 2023-12-06 PROCEDURE — 82043 UR ALBUMIN QUANTITATIVE: CPT | Performed by: NURSE PRACTITIONER

## 2023-12-06 RX ORDER — FUROSEMIDE 20 MG
20 TABLET ORAL DAILY PRN
Qty: 30 TABLET | Refills: 1 | Status: SHIPPED | OUTPATIENT
Start: 2023-12-06 | End: 2024-02-05

## 2023-12-06 RX ORDER — RESPIRATORY SYNCYTIAL VIRUS VACCINE 120MCG/0.5
0.5 KIT INTRAMUSCULAR ONCE
Qty: 1 EACH | Refills: 0 | Status: CANCELLED | OUTPATIENT
Start: 2023-12-06 | End: 2023-12-06

## 2023-12-06 RX ORDER — MULTIVIT WITH MINERALS/LUTEIN
1 TABLET ORAL 2 TIMES DAILY
COMMUNITY

## 2023-12-06 ASSESSMENT — PAIN SCALES - GENERAL: PAINLEVEL: NO PAIN (0)

## 2023-12-06 NOTE — PROGRESS NOTES
Assessment & Plan     Type 2 diabetes mellitus without complication, without long-term current use of insulin (H)  -well controlled  -continue diabetic diet and Metformin as prescribed   - STATIN NOT PRESCRIBED (INTENTIONAL); Please choose reason not prescribed from choices below.    Edema of both legs  -patient has +1 pitting edema in bilateral lower extremities   -advised patient that edema can be due to Zitega side effects   -continue compression stockings  -will try Lasix as needed only since edema causing some discomfort, patient reports his legs feel more weak and uncomfortable   - Albumin Random Urine Quantitative with Creat Ratio; Future  - Hepatic panel (Albumin, ALT, AST, Bili, Alk Phos, TP); Future  - furosemide (LASIX) 20 MG tablet; Take 1 tablet (20 mg) by mouth daily as needed (legs edema)  - Albumin Random Urine Quantitative with Creat Ratio    Malignant neoplasm metastatic to bone (H)  -following oncology, scheduled to repeat PET scan     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 Seen audiology yesterday ) and Fatigue (He is still feeling fatigued and leg weakness is getting worse- has right foot drop. )        12/6/2023     1:51 PM   Additional Questions   Roomed by chiquita   Accompanied by Wife          12/6/2023     1:51 PM   Patient Reported Additional Medications   Patient reports taking the following new medications none     Chief Complaint   Patient presents with    RECHECK     He would like to inform you about the previous appointments he had. Seen audiology yesterday     Fatigue     He is still feeling fatigued and leg weakness is getting worse- has right foot drop.        History of Present  Illness       Reason for visit:  Provider request    He eats 2-3 servings of fruits and vegetables daily.He consumes 0 sweetened beverage(s) daily.He exercises with enough effort to increase his heart rate 10 to 19 minutes per day.  He exercises with enough effort to increase his heart rate 6 days per week.   He is taking medications regularly.       Review of Systems   Constitutional, HEENT, cardiovascular, pulmonary, gi and gu systems are negative, except as otherwise noted.      Objective    /68   Pulse 89   Temp 98.2  F (36.8  C) (Tympanic)   Resp 16   Wt 78.9 kg (174 lb)   SpO2 99%   BMI 28.08 kg/m    Body mass index is 28.08 kg/m .  Physical Exam   GENERAL: healthy, alert and no distress  EYES: Eyes grossly normal to inspection, PERRL and conjunctivae and sclerae normal  RESP: lungs clear to auscultation - no rales, rhonchi or wheezes  CV: regular rates and rhythm, normal S1 S2, no S3 or S4, no murmur, click or rub, peripheral pulses strong, and 1+ bilateral lower extremity pitting edema to LE    MS: no gross musculoskeletal defects noted, no edema  SKIN: no suspicious lesions or rashes  NEURO: Normal strength and tone, mentation intact and speech normal  PSYCH: mentation appears normal, affect normal/bright  Diabetic foot exam: normal DP and PT pulses, no trophic changes or ulcerative lesions    Results for orders placed or performed in visit on 12/06/23   Albumin Random Urine Quantitative with Creat Ratio     Status: None   Result Value Ref Range    Creatinine Urine mg/dL 167.9 mg/dL    Albumin Urine mg/L <12.0 mg/L    Albumin Urine mg/g Cr     Results for orders placed or performed in visit on 12/06/23   Basic metabolic panel  (Ca, Cl, CO2, Creat, Gluc, K, Na, BUN)     Status: Abnormal   Result Value Ref Range    Sodium 138 135 - 145 mmol/L    Potassium 4.1 3.4 - 5.3 mmol/L    Chloride 103 98 - 107 mmol/L    Carbon Dioxide (CO2) 27 22 - 29 mmol/L    Anion Gap 8 7 - 15 mmol/L    Urea Nitrogen  12.5 8.0 - 23.0 mg/dL    Creatinine 0.74 0.67 - 1.17 mg/dL    GFR Estimate >90 >60 mL/min/1.73m2    Calcium 9.1 8.8 - 10.2 mg/dL    Glucose 109 (H) 70 - 99 mg/dL   Hepatic panel (Albumin, ALT, AST, Bili, Alk Phos, TP)     Status: Normal   Result Value Ref Range    Protein Total 6.7 6.4 - 8.3 g/dL    Albumin 4.0 3.5 - 5.2 g/dL    Bilirubin Total 0.9 <=1.2 mg/dL    Alkaline Phosphatase 127 40 - 150 U/L    AST 23 0 - 45 U/L    ALT 14 0 - 70 U/L    Bilirubin Direct <0.20 0.00 - 0.30 mg/dL

## 2023-12-06 NOTE — PATIENT INSTRUCTIONS
You have pitting edema in your legs.    Edema can be due to Zytiga     You can try Lasix (furosemide) 10-20 mg daily as needed

## 2023-12-07 PROBLEM — C79.51 MALIGNANT NEOPLASM METASTATIC TO BONE (H): Status: ACTIVE | Noted: 2023-12-07

## 2023-12-07 PROBLEM — C77.9 MALIGNANT NEOPLASM METASTATIC TO LYMPH NODES, UNSPECIFIED LYMPH NODE REGION (H): Status: ACTIVE | Noted: 2023-12-07

## 2023-12-18 ENCOUNTER — MYC MEDICAL ADVICE (OUTPATIENT)
Dept: FAMILY MEDICINE | Facility: CLINIC | Age: 68
End: 2023-12-18
Payer: MEDICARE

## 2023-12-18 NOTE — TELEPHONE ENCOUNTER
"Patient calls to follow-up to his [employer] message regarding positive COVID test.    He wishes to discuss possible treatments.   Paxlovid reviewed, and he declines screening for this medication.  Patient is not having severe concerning symptoms.  Home care advice reviewed per patient request, as per protocol below.    Noa Riddle RN  [facility]    Instructions for Patients    What treatments are available?  Over-the-counter medicines may help with your symptoms such as runny or stuffy nose, cough, chills, or fever. Talk to your care team about your options.     Some people are at high risk for severe illness (for example if you have a weak immune system, you're 50 years or older, or you have certain medical problems). If your risk is high and your symptoms started in the last 5 days, we strongly recommend for you to get COVID treatment as soon as possible. There are safe and effective medicines available that can make you feel better faster and prevent hospitalization and death.    To discuss COVID treatment you can:  Call your clinic OR [phone] ([phone]) and ask to speak to a nurse about a positive COVID test.  Send a [employer] message to your care team. In [employer], select \"Ask a Medical Question\" then \"Do I need an appointment\" and put COVID in the subject line. Please include a phone number to call you back in the message.      How do I self-isolate?  You isolate when you have symptoms of COVID or a test shows you have COVID, even if you don t have symptoms.   If you DO have symptoms:  Stay home and away from others  For at least 5 days after your symptoms started, AND   You are fever free for 24 hours (with no medicine that reduces fever), AND  Your other symptoms are better.  Wear a mask for 10 full days any time you are around others.  If you DON T have symptoms:  Stay at home and away from others for at least 5 days after your positive test.  Wear a mask for 10 full days any " time you are around others.    What are the symptoms of COVID-19?  Symptoms can include fever, cough, shortness of breath, chills, headache, muscle pain sore throat, fatigue, runny or stuffy nose, and loss of taste and smell. Other less common symptoms include nausea, vomiting, or diarrhea (watery stools).    Know when to call 911. Emergency warning signs include:  Trouble breathing or shortness of breath  Pain or pressure in the chest that doesn't go away  Feeling confused like you haven't felt before, or not being able to wake up  Bluish-colored lips or face    How can I take care of myself?  Get lots of rest. Drink extra fluids (unless a doctor has told you not to).  Take Tylenol (acetaminophen) for fever or pain. If you have liver or kidney problems, ask your family doctor if it's okay to take Tylenol   Adults can take either:   650 mg (two 325 mg pills) every 4 to 6 hours, or...   1,000 mg (two 500 mg pills) every 8 hours as needed.  Note: Don't take more than 3,000 mg in one day. Acetaminophen is found in many medicines (both prescribed and over the counter medicines). Read all labels to be sure you don't take too much.  For children, check the Tylenol bottle for the right dose. The dose is based on the child's age or weight.  Take over the counter medicines for your symptoms as needed. Talk to your pharmacist.  If you have other health problems (like cancer, heart failure, an organ transplant, or severe kidney disease): Call your specialty clinic if you don't feel better in the next 2 days.    Where can I get more information?  [facility] COVID-19 Resource Hub: [email or web address]   CDC Quarantine & Isolation: https://www.cdc.gov/coronavirus/2019-ncov/your-health/quarantine-isolation.html   CDC - What to Do If You're Sick: https://www.cdc.gov/coronavirus/2019-ncov/if-you-are-sick/index.html  Learn about the ACTIV-6 Clinical Trial: [email or web address] or call ([phone])-[phone]

## 2023-12-20 ENCOUNTER — MYC MEDICAL ADVICE (OUTPATIENT)
Dept: FAMILY MEDICINE | Facility: CLINIC | Age: 68
End: 2023-12-20
Payer: MEDICARE

## 2023-12-20 ENCOUNTER — TELEPHONE (OUTPATIENT)
Dept: NEUROLOGY | Facility: CLINIC | Age: 68
End: 2023-12-20
Payer: MEDICARE

## 2023-12-20 DIAGNOSIS — M21.371 RIGHT FOOT DROP: Primary | ICD-10-CM

## 2023-12-20 NOTE — TELEPHONE ENCOUNTER
[facility] Call Center    Phone Message    May a detailed message be left on voicemail: yes     Reason for Call:  LAURA, at [facility] orth[facility] called requesting notes from 11-8-2023 and a prescription for an AFO brace. Please send orders to [phone]    Action Taken: Message routed to:  [facility] ([facility] neurology    Travel Screening: Not Applicable

## 2023-12-22 NOTE — TELEPHONE ENCOUNTER
RN could of answer this question. Yes we have physical therapy here in [place]    FRANKO Kingston CNP

## 2023-12-27 ENCOUNTER — NURSE TRIAGE (OUTPATIENT)
Dept: FAMILY MEDICINE | Facility: CLINIC | Age: 68
End: 2023-12-27
Payer: MEDICARE

## 2023-12-27 ENCOUNTER — MYC MEDICAL ADVICE (OUTPATIENT)
Dept: FAMILY MEDICINE | Facility: CLINIC | Age: 68
End: 2023-12-27
Payer: MEDICARE

## 2023-12-27 NOTE — TELEPHONE ENCOUNTER
"S-(situation): tan stools for 1 week    B-(background): pt tested pos for covid 12/17. Hx crohns, diabetes. States has had tan colored stool x 1 week. No recent med changes or changes to foods.  Pt had recent OV & labs WNL.    A-(assessment): at first stated kavon colored stool but then stated stools were tan. Has stool 1x/day, smaller than normal. Normally can have 3-4 stools per day. Last crohns flare 11/29 with diarrhea  Denies fever, blood/black tarry stools, abd/back pain.   Feels fine other than tan stools.    R-(recommendations): pt doesn't feel needs to be seen at this time. recommended to watch. If worsens, blood/black, fever, pain will need to be seen sooner. Pt to call back to speak with RN if he has further questions.    Kimi Lackey RN        Answer Assessment - Initial Assessment Questions  1. COLOR: \"What color is it?\" \"Is that color in part or all of the stool?\"      Kavon colored x1 week. Started few days after testing pos for covid. Most of stool is kavon colored. Eating normally. Watching carbs x 1 mos. Diabetic: BG avg 107. Hx crohns.   2. ONSET: \"When was the unusual color first noted?\"      1 week ago  3. CAUSE: \"Have you eaten any food or taken any medicine of this color?\" Note: See listing in Background Information section.       No foods or meds. Takes 2 multi vits per day-has been taking for few mos  4. OTHER SYMPTOMS: \"Do you have any other symptoms?\" (e.g., abdomen pain, diarrhea, jaundice, fever).      Few times felt \"gassy\", no abd pain. Had diarrhea 1 week prior to kavon colored stool. Quit caffeine on 11/29 the diarrhea has subsided. Comes back every once in awhile. This is on/off. May be crohns related. No fever. No blood. No black tarry stool. No rectal bleeding.    Protocols used: Stools - Unusual Color-A-AH    "

## 2023-12-27 NOTE — TELEPHONE ENCOUNTER
RN notes that patient has been triaged via phone since sending this MyChart, therefore closing this encounter.    Noa Riddle RN  [facility]

## 2024-01-02 ENCOUNTER — THERAPY VISIT (OUTPATIENT)
Dept: PHYSICAL THERAPY | Facility: CLINIC | Age: 69
End: 2024-01-02
Attending: NURSE PRACTITIONER
Payer: MEDICARE

## 2024-01-02 DIAGNOSIS — T45.1X5A PERIPHERAL NEUROPATHY DUE TO CHEMOTHERAPY (H): ICD-10-CM

## 2024-01-02 DIAGNOSIS — R26.81 GAIT INSTABILITY: ICD-10-CM

## 2024-01-02 DIAGNOSIS — G62.0 PERIPHERAL NEUROPATHY DUE TO CHEMOTHERAPY (H): ICD-10-CM

## 2024-01-02 DIAGNOSIS — R29.898 BILATERAL LEG WEAKNESS: ICD-10-CM

## 2024-01-02 PROCEDURE — 97162 PT EVAL MOD COMPLEX 30 MIN: CPT | Mod: GP | Performed by: PHYSICAL THERAPIST

## 2024-01-02 PROCEDURE — 97110 THERAPEUTIC EXERCISES: CPT | Mod: GP | Performed by: PHYSICAL THERAPIST

## 2024-01-02 NOTE — PROGRESS NOTES
PHYSICAL THERAPY EVALUATION  Type of Visit: Evaluation    See electronic medical record for Abuse and Falls Screening details.    Subjective Neuropathy from chemo, got it with each time of chemo - five round, then it went away, last time it didn't go away. Chemo ended Jan 2023, then had radiation 28 sessions,around March, foot drop showed up after radiation, thinks nerve damage. June started using walking stick, walking got more wobbly over the summer. Toe dragging more, fell outdoors. Did some PT at [facility] for strengthening. Was using cane, got 4WW last week. Has had several falls. Developed lymphedema LE, has compression socks now.      Presenting condition or subjective complaint: Neuropathy in both feet; the right more than the left. Foot drop in right foot. Balance issues. Use cane, probably should use a walker. Appointment on 01/15/24 to be measured for an AFO.  Date of onset: 05/01/23 (approx date)    Relevant medical history: Arthritis; Bladder or bowel problems; Cancer; Diabetes; Foot drop; Radiation treatment; Rheumatoid arthritis   Dates & types of surgery: chemotherapy and radiotherapy    Prior diagnostic imaging/testing results:       Prior therapy history for the same diagnosis, illness or injury: Yes [facility] [id number]. Stretching, Leg strengthening and balance exercises.    Prior Level of Function  Transfers: Independent  Ambulation: Independent  ADL: Independent  IADL: Finances, Housekeeping, Work, Yard workdrives    Living Environment  Social support: With a significant other or spouse   Type of home: [facility]; 2-story   Stairs to enter the home: No       Ramp: No   Stairs inside the home: Yes 13 Is there a railing: Yes   Help at home: Self Cares (home health aide/personal care attendant, family, etc)  Equipment owned: Four-point cane     Employment: No  retired realtor, had to retire due to bad mobility/gait  Hobbies/Interests: Hiking (prior), birding, reading, music    Patient goals for  "therapy: Walk.     Pain assessment:      Objective        RANGE OF MOTION: R ankle DF 5* passive, IV and EV WNL passive  STRENGTH: R ankle DF, IV and EV all 0/5, PF 3/5 - able to do B heel raises  R Quad 4+, ham 4/5, hip flex 3+, hip abd 2  L quad and ham 5/5, hip flex 4, hip abd 4, ankle DF 5/5  UE grossly 4+-5/5 for all    BED MOBILITY: self assists R LE onto bed, difficulty rolling to side    TRANSFERS:     GAIT: using 4WW, R foot drop      BALANCE:     SPECIAL TESTS  Functional Gait Assessment (FGA)      10 Meter Walk Test (Comfortable)  12.96 sec using 4WW, 18 steps   10 Meter Walk Test (Fast)     6 Minute Walk Test (6MWT)             Modified CTSIB Conditions (sec) Cond 1:   Cond 2:   Cond 4:   Cond 5 :    Romberg  (sec)    Sharpened Romberg (sec)    30 Second Sit to Stand (reps/height) 8 from 22\" mat         SENSATION: pt report tingling/numbness bottom of R foot, L whole foot up to ankle        Assessment & Plan   CLINICAL IMPRESSIONS  Medical Diagnosis: gait instability, B LE weakness, neuropahty    Treatment Diagnosis:     Impression/Assessment: Patient is a 68 year old male with weakness, impaired gait complaints.  The following significant findings have been identified: Decreased ROM/flexibility, Decreased strength, Impaired balance, Impaired sensation, Impaired gait, and Impaired muscle performance. These impairments interfere with their ability to perform work tasks, recreational activities, household chores, driving , household mobility, and community mobility as compared to previous level of function.     Clinical Decision Making (Complexity):  Clinical Presentation: Evolving/Changing  Clinical Presentation Rationale: based on medical and personal factors listed in PT evaluation  Clinical Decision Making (Complexity): Moderate complexity    PLAN OF CARE  Treatment Interventions:  Interventions: Gait Training, Manual Therapy, Neuromuscular Re-education, Therapeutic Activity, Therapeutic " Exercise    Long Term Goals     PT Goal 1  Goal Identifier: 1  Goal Description: will obtain AFO  in order to ambulate indep household distance w/o asst device in 6wk  Target Date: 02/13/24  PT Goal 2  Goal Identifier: 2  Goal Description: pt will be able to do reciprical stairs up, (not down) in 8wk  Target Date: 02/27/24  PT Goal 3  Goal Identifier: 3  Goal Description: pt will be able to walk community distances in 8wk for shopping, social outings  Target Date: 02/27/24      Frequency of Treatment: 2x/wk x 4wks, 1x/wk x4wk  Duration of Treatment: 6wk    Recommended Referrals to Other Professionals:   Education Assessment:   Learner/Method: Patient;Demonstration;No Barriers to Learning    Risks and benefits of evaluation/treatment have been explained.   Patient/Family/caregiver agrees with Plan of Care.     Evaluation Time:     PT Eval, Moderate Complexity Minutes (31294): 25       Signing Clinician: Kris Hoenk, PT [facility]                                                                                   OUTPATIENT PHYSICAL THERAPY      PLAN OF TREATMENT FOR OUTPATIENT REHABILITATION   Patient's Last Name, First Name, Se Monterroso YOB: 1955   Provider's Name   [facility]   Medical Record No.  [id number]     Onset Date: 05/01/23 (approx date)  Start of Care Date: 01/02/24     Medical Diagnosis:  gait instability, B LE weakness, neuropahty      PT Treatment Diagnosis:    Plan of Treatment  Frequency/Duration: 2x/wk x 4wks, 1x/wk x4wk/ 8wk    Certification date from 01/02/24 to 02/27/24         See note for plan of treatment details and functional goals     Kris Hoenk, PABLITO                         I CERTIFY THE NEED FOR THESE SERVICES FURNISHED UNDER        THIS PLAN OF TREATMENT AND WHILE UNDER MY CARE     (Physician attestation of this document indicates review and certification of the therapy plan).              Referring  Provider:  Ashley Akhtar    Initial Assessment  See Epic Evaluation- Start of Care Date: 01/02/24

## 2024-01-05 ENCOUNTER — THERAPY VISIT (OUTPATIENT)
Dept: PHYSICAL THERAPY | Facility: CLINIC | Age: 69
End: 2024-01-05
Attending: NURSE PRACTITIONER
Payer: MEDICARE

## 2024-01-05 DIAGNOSIS — R26.81 GAIT INSTABILITY: ICD-10-CM

## 2024-01-05 DIAGNOSIS — R29.898 BILATERAL LEG WEAKNESS: Primary | ICD-10-CM

## 2024-01-05 PROCEDURE — 97110 THERAPEUTIC EXERCISES: CPT | Mod: GP | Performed by: PHYSICAL THERAPIST

## 2024-01-12 ENCOUNTER — THERAPY VISIT (OUTPATIENT)
Dept: PHYSICAL THERAPY | Facility: CLINIC | Age: 69
End: 2024-01-12
Attending: NURSE PRACTITIONER
Payer: MEDICARE

## 2024-01-12 DIAGNOSIS — R26.81 GAIT INSTABILITY: ICD-10-CM

## 2024-01-12 DIAGNOSIS — R29.898 BILATERAL LEG WEAKNESS: Primary | ICD-10-CM

## 2024-01-12 PROCEDURE — 97112 NEUROMUSCULAR REEDUCATION: CPT | Mod: GP | Performed by: PHYSICAL THERAPIST

## 2024-01-12 PROCEDURE — 97110 THERAPEUTIC EXERCISES: CPT | Mod: GP | Performed by: PHYSICAL THERAPIST

## 2024-01-16 ENCOUNTER — OFFICE VISIT (OUTPATIENT)
Dept: NEUROLOGY | Facility: CLINIC | Age: 69
End: 2024-01-16
Attending: STUDENT IN AN ORGANIZED HEALTH CARE EDUCATION/TRAINING PROGRAM
Payer: MEDICARE

## 2024-01-16 ENCOUNTER — DOCUMENTATION ONLY (OUTPATIENT)
Dept: NEUROLOGY | Facility: CLINIC | Age: 69
End: 2024-01-16

## 2024-01-16 DIAGNOSIS — C61 MALIGNANT NEOPLASM OF PROSTATE (H): ICD-10-CM

## 2024-01-16 DIAGNOSIS — G54.1 RADIATION-INDUCED LUMBOSACRAL PLEXOPATHY: Primary | ICD-10-CM

## 2024-01-16 DIAGNOSIS — M21.371 RIGHT FOOT DROP: ICD-10-CM

## 2024-01-16 DIAGNOSIS — R27.8 OTHER LACK OF COORDINATION: ICD-10-CM

## 2024-01-16 PROCEDURE — 95910 NRV CNDJ TEST 7-8 STUDIES: CPT | Mod: GC | Performed by: STUDENT IN AN ORGANIZED HEALTH CARE EDUCATION/TRAINING PROGRAM

## 2024-01-16 PROCEDURE — 95885 MUSC TST DONE W/NERV TST LIM: CPT | Mod: 59 | Performed by: STUDENT IN AN ORGANIZED HEALTH CARE EDUCATION/TRAINING PROGRAM

## 2024-01-16 PROCEDURE — 95886 MUSC TEST DONE W/N TEST COMP: CPT | Mod: GC | Performed by: STUDENT IN AN ORGANIZED HEALTH CARE EDUCATION/TRAINING PROGRAM

## 2024-01-16 RX ORDER — GABAPENTIN 300 MG/1
300 CAPSULE ORAL AT BEDTIME
Qty: 90 CAPSULE | Refills: 1 | Status: SHIPPED | OUTPATIENT
Start: 2024-01-16 | End: 2024-03-08

## 2024-01-16 NOTE — PROGRESS NOTES
PROGRESS NOTE    EMG/NCS 1/16/2024:  There is electrophysiologic evidence of a subacute-chronic patchy neurogenic process involving right L3-S1 and left L5-S1 distributions. These findings can be seen in bilateral lumbosacral plexopathy vs multilevel lumbosacral radiculopathies. However, the absent sensory nerve action potentials  and sparing of lumbar paraspinal muscles make the latter less likely.     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     Discussed the findings of the EMG with the patient after his EMG appointment today.  The findings on EMG are consistent with patchy right more than left lumbosacral plexopathy.  He feels that his symptoms have slightly progressed in the past couple of months.  He can no longer walk with a cane and had to start using a walker.  He also develops more noticeable shooting pain in the right foot requiring gabapentin at night time. The continuous progression of symptoms raises a concern for ongoing inflammation. MRI of the plexus did not show abnormal enhancement to support this. We discussed obtaining lumbar puncture and nerve biopsy to evaluate further for evidence of inflammation. If this is present, we will consider a trial of immunosuppressive therapy (12-week course of IV methylprednisolone). Patient would like to pursue with LP and nerve biopsy after the discussion.     - Lumbar puncture. CSF for cell count, diff, protein, glucose, cytology, oligoclonal band, IgG  index, autoimmune axonal antibodies panel   - Right superficial peroneal nerve biopsy. Discussed risks, goals, alternatives, and complications of nerve biopsy with the patient, including but not limited to infection, numbness, and local neuropathic discomfort in the distribution of the biopsied nerve. They also understand that while the neuropathic discomfort generally improves with time, there are rare cases in which there is persistent neuropathic pain necessitating management with neuromodulatory medications. The patient understands and wishes to proceed.  - Gabapentin 300 mg oral hs    ADDENDUM 1/18/2024  I talked to Mr. Quinonez on the phone today. He has changed his mind and would like to hold off on nerve biopsy and lumbar puncture at this time. He understands that the weakness and numbness in the lower limbs may continue to progress based on the EMG findings. If there was an inflammatory component, high dose IV methylprednisolone may help stop the progression. He has upcoming appointment with his oncologist and would like to wait to hear his input. He will let me know if he would like to pursue with the above tests in the future.

## 2024-01-16 NOTE — PROGRESS NOTES
[facility]  Electrodiagnostic Laboratory                 Department of Neurology                                                                                                         Test Date:  2024    Patient: Se Quinonez : 1955 Physician: Jahaira Haas MD   Sex: Male AGE: 68 year Ref Phys: Jahaira Haas MD   ID#: [id number]   Technician: Fellow     History and Examination:  Se Quinonez is a 68 year old man with high risk metastatic prostate adenocarcinoma s/p chemo-hormonal treatment including docetaxel (last dosed in 2023) and several rounds of radiation to the prostate and right hip in March and 2023.  This has been complicated by subacute and progressive asymmetric (R>L) weakness and numbness in the lower limbs.  This study is performed to evaluate for bilateral lumbosacral plexopathy vs radiculopathy vs polyneuropathy.    Techniques:  Motor and sensory conduction studies were done with surface recording electrodes. EMG was done with a concentric needle electrode.     Results:  Antidromic sensory responses of bilateral sural and superficial peroneal nerves were absent.  Motor conduction studies of the right deep branch peroneal (TA and EDB) nerve were absent.  Motor conduction studies of the right tibial (AH), left peroneal (EDB), and  left deep branch peroneal (TA) showed reduced CMAP amplitude.  Motor conduction study of the left tibial (AH) nerve showed normal CMAP amplitude but slowed conduction velocity.      F-wave latencies of the bilateral tibial (AH) nerves were within normal limits.    Needle EMG showed reduced recruitment of high-amplitude, long-duration, polyphasic motor unit potentials in all muscles tested in the right lower limb and in the left tibialis anterior and left gastrocnemius muscles.  Dense fibrillation potentials were noted in right tibialis anterior, medial gastrocnemius, gluteus medius, iliopsoas,  and left tibialis anterior.  Needle EMG of the right lower lumbar paraspinals and left vastus medialis were normal.  There was no myokymia in any of the muscles examined.    Interpretation:  This is an abnormal study.  There is electrophysiologic evidence of a subacute-chronic patchy neurogenic process involving right L3-S1 and left L5-S1 distributions. These findings can be seen in bilateral lumbosacral plexopathy vs multilevel lumbosacral radiculopathies. However, the absent sensory nerve action potentials  and sparing of lumbar paraspinal muscles make the latter less likely.     Study performed with Dr. Samina Avendano MD  Neuromuscular Medicine Fellow, PGY-5  [facility]    I was present for all key portions of the NCS/EMG study. All data and waveforms personally reviewed. I agree with the results and interpretation as above.     Jahaira Haas MD  Department of Neurology        Nerve Conduction Studies  Motor Sites      Latency Amplitude Neg. Amp Diff Segment Distance Velocity Neg. Dur Neg Area Diff Temperature Comment   Site (ms) Norm (mV) Norm (%)  cm m/s Norm (ms) (%) ( C)    Left Dp Branch Fibular (TA) Motor   Fib Head 4.1  < 6.0 2.3 -      11.3  25.5    Pop Fossa 5.4  < 5.7 2.2 - -4 Pop Fossa-Fib Head - - - 11.4 -11 25.5    Right Dp Branch Fibular (TA) Motor   Fib Head NR  < 6.0 NR -      NR  24.6    Left Fibular (EDB) Motor   Ankle 8.0  < 6.0 1.49  > 2.0  Ankle-EDB 8   6.3  25.3    Bel Fib Head 14.3 - 1.29 - -13 Bel Fib Head-Ankle 24.5 39  > 38 6.2 -10 25.4    Pop Fossa 17.4 - 1.19 - -8 Pop Fossa-Bel Fib Head 12 39  > 38 5.8 -13 25.4    Right Fibular (EDB) Motor   Ankle NR  < 6.0 NR  > 2.0  Ankle-EDB 8   NR  24.5    Bel Fib Head NR - NR - NR Bel Fib Head-Ankle 27 NR  > 38 NR NR 24.5    Left Tibial (AHB) Motor   Ankle 5.2  < 6.5 7.3  > 5.0  Ankle-AHB 8   6.4  25.5    Knee 16.7 - 5.6 - -23 Knee-Ankle 37.5 33  > 38 6.2 -19 25.4    Right Tibial (AHB) Motor   Ankle 6.1  <  6.5 4.1  > 5.0  Ankle-AHB 8   5.8  24.6    Knee 15.2 - 3.0 - -27 Knee-Ankle 36 40  > 38 7.6 -15 24.8      F-Wave Sites      Min F-Lat Max-Min F-Lat Mean F-Lat   Site (ms) (ms) (ms)   Left Tibial F-Wave   Ankle 59.2 7.0 -   Right Tibial F-Wave   Ankle 48.5 14.5 -     Sensory Sites      Onset Lat Peak Lat Amp (O-P) Amp (P-P) Segment Distance Velocity Temperature Comment   Site ms (ms)  V Norm ( V)  cm m/s Norm ( C)    Left Superficial Fibular Sensory   14 cm-Ankle NR NR NR  > 3 NR 14 cm-Ankle 12.5 NR  > 38 25.5    Right Superficial Fibular Sensory   14 cm-Ankle NR NR NR  > 3 NR 14 cm-Ankle 12.5 NR  > 38 24.5    Left Sural Sensory   Calf-Lat Mall NR NR NR  > 5 NR Calf-Lat Mall 14 NR  > 38 25.4    Right Sural Sensory   Calf-Lat Mall NR NR NR  > 5 NR Calf-Lat Mall 14 NR  > 38 25.8        Electromyography     Side Muscle Ins Act Fibs/PSW Fasc HF Amp Dur Poly Recrt Int Pat   Right Tib Anterior Nml 3+ Nml 0 Nml 2+ 3+ SevRed Nml   Right Gastroc MH Nml 2+ Nml 0 2+ 2+ 1+ ModRed Nml   Right Vastus Med Nml None Nml 0 1+ 1+ 0 Rekha Nml   Right Gluteus Med Nml 2+ Nml 0 2+ 2+ 3+ ModRed Nml   Right Lumbo Parasp (Lower) Nml None Nml 0 Nml Nml 0 Nml Nml   Right Iliopsoas Nml 2+ Nml 0 1+ 1+ 1+ Rekha Nml   Left Tib Anterior Nml 2+ Nml 0 2+ 2+ 2+ ModRed Nml   Left Gastroc MH Nml None Nml 0 1+ 1+ 0 Rekha Nml   Left Vastus Med Nml None Nml 0 Nml Nml 0 Nml Nml         NCS Waveforms:    Motor                    Sensory                F-Wave

## 2024-01-16 NOTE — LETTER
2024       RE: Se Quinonez   [facility] [id number]     Dear Colleague,    Thank you for referring your patient, Se Quinonez, to the [facility] EMG CLINIC [place] [facility]. Please see a copy of my visit note below.                            [facility]  Electrodiagnostic Laboratory                 Department of Neurology                                                                                                         Test Date:  2024    Patient: Se Quinonez : 1955 Physician: Jahaira Haas MD   Sex: Male AGE: 68 year Ref Phys: Jahaira Haas MD   ID#: [id number]   Technician: Fellow     History and Examination:  Se Quinonez is a 68 year old man with high risk metastatic prostate adenocarcinoma s/p chemo-hormonal treatment including docetaxel (last dosed in 2023) and several rounds of radiation to the prostate and right hip in March and 2023.  This has been complicated by subacute and progressive asymmetric (R>L) weakness and numbness in the lower limbs.  This study is performed to evaluate for bilateral lumbosacral plexopathy vs radiculopathy vs polyneuropathy.    Techniques:  Motor and sensory conduction studies were done with surface recording electrodes. EMG was done with a concentric needle electrode.     Results:  Antidromic sensory responses of bilateral sural and superficial peroneal nerves were absent.  Motor conduction studies of the right deep branch peroneal (TA and EDB) nerve were absent.  Motor conduction studies of the right tibial (AH), left peroneal (EDB), and  left deep branch peroneal (TA) showed reduced CMAP amplitude.  Motor conduction study of the left tibial (AH) nerve showed normal CMAP amplitude but slowed conduction velocity.      F-wave latencies of the bilateral tibial (AH) nerves were within normal limits.    Needle EMG showed reduced  recruitment of high-amplitude, long-duration, polyphasic motor unit potentials in all muscles tested in the right lower limb and in the left tibialis anterior and left gastrocnemius muscles.  Dense fibrillation potentials were noted in right tibialis anterior, medial gastrocnemius, gluteus medius, iliopsoas, and left tibialis anterior.  Needle EMG of the right lower lumbar paraspinals and left vastus medialis were normal.  There was no myokymia in any of the muscles examined.    Interpretation:  This is an abnormal study.  There is electrophysiologic evidence of a subacute-chronic patchy neurogenic process involving right L3-S1 and left L5-S1 distributions. These findings can be seen in bilateral lumbosacral plexopathy vs multilevel lumbosacral radiculopathies. However, the absent sensory nerve action potentials  and sparing of lumbar paraspinal muscles make the latter less likely.     Study performed with Dr. Samina Avendano MD  Neuromuscular Medicine Fellow, PGY-5  [facility]    I was present for all key portions of the NCS/EMG study. All data and waveforms personally reviewed. I agree with the results and interpretation as above.     Jahaira Haas MD  Department of Neurology        Nerve Conduction Studies  Motor Sites      Latency Amplitude Neg. Amp Diff Segment Distance Velocity Neg. Dur Neg Area Diff Temperature Comment   Site (ms) Norm (mV) Norm (%)  cm m/s Norm (ms) (%) ( C)    Left Dp Branch Fibular (TA) Motor   Fib Head 4.1  < 6.0 2.3 -      11.3  25.5    Pop Fossa 5.4  < 5.7 2.2 - -4 Pop Fossa-Fib Head - - - 11.4 -11 25.5    Right Dp Branch Fibular (TA) Motor   Fib Head NR  < 6.0 NR -      NR  24.6    Left Fibular (EDB) Motor   Ankle 8.0  < 6.0 1.49  > 2.0  Ankle-EDB 8   6.3  25.3    Bel Fib Head 14.3 - 1.29 - -13 Bel Fib Head-Ankle 24.5 39  > 38 6.2 -10 25.4    Pop Fossa 17.4 - 1.19 - -8 Pop Fossa-Bel Fib Head 12 39  > 38 5.8 -13 25.4    Right Fibular (EDB) Motor    Ankle NR  < 6.0 NR  > 2.0  Ankle-EDB 8   NR  24.5    Bel Fib Head NR - NR - NR Bel Fib Head-Ankle 27 NR  > 38 NR NR 24.5    Left Tibial (AHB) Motor   Ankle 5.2  < 6.5 7.3  > 5.0  Ankle-AHB 8   6.4  25.5    Knee 16.7 - 5.6 - -23 Knee-Ankle 37.5 33  > 38 6.2 -19 25.4    Right Tibial (AHB) Motor   Ankle 6.1  < 6.5 4.1  > 5.0  Ankle-AHB 8   5.8  24.6    Knee 15.2 - 3.0 - -27 Knee-Ankle 36 40  > 38 7.6 -15 24.8      F-Wave Sites      Min F-Lat Max-Min F-Lat Mean F-Lat   Site (ms) (ms) (ms)   Left Tibial F-Wave   Ankle 59.2 7.0 -   Right Tibial F-Wave   Ankle 48.5 14.5 -     Sensory Sites      Onset Lat Peak Lat Amp (O-P) Amp (P-P) Segment Distance Velocity Temperature Comment   Site ms (ms)  V Norm ( V)  cm m/s Norm ( C)    Left Superficial Fibular Sensory   14 cm-Ankle NR NR NR  > 3 NR 14 cm-Ankle 12.5 NR  > 38 25.5    Right Superficial Fibular Sensory   14 cm-Ankle NR NR NR  > 3 NR 14 cm-Ankle 12.5 NR  > 38 24.5    Left Sural Sensory   Calf-Lat Mall NR NR NR  > 5 NR Calf-Lat Mall 14 NR  > 38 25.4    Right Sural Sensory   Calf-Lat Mall NR NR NR  > 5 NR Calf-Lat Mall 14 NR  > 38 25.8        Electromyography     Side Muscle Ins Act Fibs/PSW Fasc HF Amp Dur Poly Recrt Int Pat   Right Tib Anterior Nml 3+ Nml 0 Nml 2+ 3+ SevRed Nml   Right Gastroc MH Nml 2+ Nml 0 2+ 2+ 1+ ModRed Nml   Right Vastus Med Nml None Nml 0 1+ 1+ 0 Rekha Nml   Right Gluteus Med Nml 2+ Nml 0 2+ 2+ 3+ ModRed Nml   Right Lumbo Parasp (Lower) Nml None Nml 0 Nml Nml 0 Nml Nml   Right Iliopsoas Nml 2+ Nml 0 1+ 1+ 1+ Rekha Nml   Left Tib Anterior Nml 2+ Nml 0 2+ 2+ 2+ ModRed Nml   Left Gastroc MH Nml None Nml 0 1+ 1+ 0 Rekha Nml   Left Vastus Med Nml None Nml 0 Nml Nml 0 Nml Nml         NCS Waveforms:    Motor                    Sensory                F-Wave               Again, thank you for allowing me to participate in the care of your patient.      Sincerely,    Jhaaira Haas MD

## 2024-01-17 ENCOUNTER — MEDICAL CORRESPONDENCE (OUTPATIENT)
Dept: HEALTH INFORMATION MANAGEMENT | Facility: CLINIC | Age: 69
End: 2024-01-17
Payer: MEDICARE

## 2024-01-18 ENCOUNTER — TRANSFERRED RECORDS (OUTPATIENT)
Dept: HEALTH INFORMATION MANAGEMENT | Facility: CLINIC | Age: 69
End: 2024-01-18
Payer: MEDICARE

## 2024-01-18 ENCOUNTER — PATIENT OUTREACH (OUTPATIENT)
Dept: SURGERY | Facility: CLINIC | Age: 69
End: 2024-01-18
Payer: MEDICARE

## 2024-01-18 NOTE — PROGRESS NOTES
"A referral was placed by Dr. Haas for the patient to consult with General Surgery for consideration of a nerve biopsy. Contacted the patient and he declined to schedule a consultation as he needs more information and also stated that a \"spinal tap\" was also being requested for workup.    The patient will contact this office if he would like to pursue nerve biopsy consult.  "

## 2024-01-19 ENCOUNTER — THERAPY VISIT (OUTPATIENT)
Dept: PHYSICAL THERAPY | Facility: CLINIC | Age: 69
End: 2024-01-19
Attending: NURSE PRACTITIONER
Payer: MEDICARE

## 2024-01-19 DIAGNOSIS — R26.81 GAIT INSTABILITY: ICD-10-CM

## 2024-01-19 DIAGNOSIS — R29.898 BILATERAL LEG WEAKNESS: Primary | ICD-10-CM

## 2024-01-19 PROCEDURE — 97110 THERAPEUTIC EXERCISES: CPT | Mod: GP | Performed by: PHYSICAL THERAPIST

## 2024-01-22 ENCOUNTER — OFFICE VISIT (OUTPATIENT)
Dept: PODIATRY | Facility: CLINIC | Age: 69
End: 2024-01-22
Payer: MEDICARE

## 2024-01-22 DIAGNOSIS — M05.79 RHEUMATOID ARTHRITIS INVOLVING MULTIPLE SITES WITH POSITIVE RHEUMATOID FACTOR (H): ICD-10-CM

## 2024-01-22 DIAGNOSIS — M21.371 ACQUIRED RIGHT FOOT DROP: ICD-10-CM

## 2024-01-22 DIAGNOSIS — E11.43 TYPE II DIABETES MELLITUS WITH PERIPHERAL AUTONOMIC NEUROPATHY (H): Primary | ICD-10-CM

## 2024-01-22 PROCEDURE — 99203 OFFICE O/P NEW LOW 30 MIN: CPT | Performed by: PODIATRIST

## 2024-01-22 NOTE — NURSING NOTE
"Chief Complaint   Patient presents with    Consult     Drop foot right with callus that is open- AFO brace       Initial There were no vitals taken for this visit. Estimated body mass index is 28.08 kg/m  as calculated from the following:    Height as of 9/18/23: 1.676 m (5' 6\").    Weight as of 12/6/23: 78.9 kg (174 lb).  Medications and allergies reviewed.      Kim DOWNEY MA    "

## 2024-01-22 NOTE — PATIENT INSTRUCTIONS
Calluses of the Foot    I.  Calluses on the toes   A.  Tips of the toes    1.  Due to pressure on the tips of the toes when wearing shoes, sandals or barefoot.    2.  Related to hammertoe deformity of the toes.    3.  Treated with wearing soft cushioned toe caps or shoe liners to better offload the pressure to the tips of the toes    4.  Correcting the deformity of the toe is another option if cushions do not help   B.  Top of sides of the knuckles of the toes    1.  Due to pressure from adjacent toes or shoes on the knuckles of the toes.    2.  Can be related to hammertoe deformities    3.  Treated with wearing roomy shoes with soft top-cover material in order not to rub on the skin    4.  Silicone toe caps can also be used to protect rubbing from the knuckles of the adjacent toes.    5.  Correcting the deformity of the toe is another option if offloading measures do not work.  II. Calluses on the bottom of the foot   A.   Pressure points    1.  Caused by direct pressure overlying prominent bones such as metatarsal head on the balls of the foot.    2.  Can be related to either hammertoe deformities or fat pad atrophy    3.  Can be treated with additional cushion utilizing silicone shoe liners to better offload the pressure and supplement for fat pad atrophy.    4.  Surgical correction of hammertoe deformities is another option if offloading cushion treatment does not work.   B.   Shear forces    1.  Caused by sliding forces along the skin on the bottom of the forefoot including the great toe.    2.  This is not due to a rotational force as the foot pushes off against the ground.    3.  Treated with wearing a silicone shoe liner that can move with the skin reducing the amount of shear force causing the callus formation.   C.   IPK lesions or porokeratosis    1.  These are small hard callus lesions that are related to plugged sweat ducts.    2.  These ducts travel through the epidermis and dermis residing in the  subcutaneous tissue    3.  When the ducts get clogged, they can harden up resulting in callused skin around them.      4.  Treatment includes sharp excavation of the hyperkeratotic callus tissue core as far as can be tolerated, preferably without bleeding.    5.   Other treatment options   A.  Application of salicylic acid to soften the hyperkeratotic skin   B.  Cantharone which causes a blister in attempt to pull off the hyperkeratotic skin lesion.    Helpful products:         Soles  Silicone Shoe Inserts    Amazon: https://www.[place]/Soles-Silicone-Orthopedic-Comfortable-Hypoallergenic         Dry Skin Treatment Recommendations    Apply to dry skin daily.  Best time to apply creams is right after a shower or bath.  For thick, dry, cracked skin  First, apply a Vitamin D cream  Maxasorb D3 cream  Second, apply a keratolytic cream to help break down the thick, hard skin  CeraVe Moisturizing Cream for Psoriasis Treatment  8 Oz  With Salicylic Acid & Urea  PurSources Urea 40% Foot Cream 4 oz.  For dry itchy skin on the lower legs  CeraVe Moisturizing Cream for Itch Relief  12 Ounce  Dry Skin Itch Relief Cream with Pramoxine Hydrochloride  Fragrance Free

## 2024-01-22 NOTE — PROGRESS NOTES
PATIENT HISTORY:  Se Quinonez is a 68 year old male with type 2 diabetes mellitus with peripheral neuropathy who presents to clinic as a self referral with a chief complaint of weakness to the right lower extremity.  The patient relates occasional stumbling on the right foot due to the weakness.  The patient is seen with their wife.  The patient also relates having had developed painful calluses on the balls of both feet.   Any previous notes and studies that pertain to the patient's condition were reviewed.    Pertinent medical, surgical and family history was reviewed in the Epic chart.    Past Medical History:   Past Medical History:   Diagnosis Date    Arthritis 04/05/2023    Rheumatoid factor confirmed    Cancer (H) 09/2923    Prostate    Crohn disease (H)     Diabetes (H) 08/21/2023    Herpes zoster     Created by Conversion  Replacement Utility updated for latest IMO load    Postherpetic polyneuropathy     Created by Conversion        Medications:   Current Outpatient Medications:     abiraterone (ZYTIGA) 250 MG tablet, Take 1,000 mg by mouth daily Taking 2 250mg daily, Disp: , Rfl:     blood glucose (NO BRAND SPECIFIED) test strip, Use to test blood sugar one time daily or as directed. To accompany: Blood Glucose Monitor Brands: per insurance., Disp: 100 strip, Rfl: 6    blood glucose monitoring (NO BRAND SPECIFIED) meter device kit, Use to test blood sugar one time daily or as directed. Preferred blood glucose meter OR supplies to accompany: Blood Glucose Monitor Brands: per insurance., Disp: 1 kit, Rfl: 0    famotidine (PEPCID) 20 MG tablet, Take 20 mg by mouth 2 times daily, Disp: , Rfl:     furosemide (LASIX) 20 MG tablet, Take 1 tablet (20 mg) by mouth daily as needed (legs edema), Disp: 30 tablet, Rfl: 1    gabapentin (NEURONTIN) 300 MG capsule, Take 1 capsule (300 mg) by mouth at bedtime, Disp: 90 capsule, Rfl: 1    leuprolide (LUPRON DEPOT) 22.5 MG kit, Inject 22.5 mg into the muscle every 3  months, Disp: , Rfl:     metFORMIN (GLUCOPHAGE XR) 500 MG 24 hr tablet, Take 1 tablet (500 mg) by mouth daily (with dinner), Disp: 90 tablet, Rfl: 1    multivitamin (CENTRUM SILVER) tablet, Take 1 tablet by mouth 2 times daily, Disp: , Rfl:     tamsulosin (FLOMAX) 0.4 MG capsule, Take 1 capsule (0.4 mg) by mouth daily, Disp: 90 capsule, Rfl: 3    thin (NO BRAND SPECIFIED) lancets, Use with lanceting device. To accompany: Blood Glucose Monitor Brands: per insurance., Disp: 100 each, Rfl: 6    UNABLE TO FIND, every 6 months MEDICATION NAME: Rituxin, Disp: , Rfl:     gabapentin (NEURONTIN) 100 MG capsule, Take 3 capsules (300 mg) by mouth at bedtime, Disp: 90 capsule, Rfl: 1    olaparib (LYNPARZA) 150 MG tablet, , Disp: , Rfl:     predniSONE (DELTASONE) 5 MG tablet, Take 1 tablet (5 mg) by mouth 2 times daily (Patient taking differently: Take 5 mg by mouth daily), Disp: , Rfl:     STATIN NOT PRESCRIBED (INTENTIONAL), Please choose reason not prescribed from choices below., Disp: , Rfl:      Allergies:  No Known Allergies    Vitals: There were no vitals taken for this visit.  BMI= There is no height or weight on file to calculate BMI.    LOWER EXTREMITY PHYSICAL EXAM    Dermatologic: Skin is intact to right and left lower extremity without significant lesions, rash or abrasion.   Mild callus formation noted developing under the first metatarsal head bilaterally.  Lack of fat pad atrophy noted to the balls of both feet.     Vascular: DP & PT pulses are intact & regular on the right and left.   CFT and skin temperature is normal to the right and left lower extremity.     Neurologic: Lower extremity sensation is intact to light touch.  Noted evidence of weakness in the right lower extremity.        Musculoskeletal: Patient is ambulatory without assistive device or brace.  No gross ankle deformity noted.  No foot or ankle joint effusion is noted.              ASSESSMENT / PLAN:     ICD-10-CM    1. Type II diabetes mellitus  with peripheral autonomic neuropathy (H)  E11.43 Orthotics and Prosthetics Order Orthotic; AFO (Ankle Foot Orthosis) (rigid posterior brace right); Right; Custom      2. Rheumatoid arthritis involving multiple sites with positive rheumatoid factor (H)  M05.79 Orthotics and Prosthetics Order Orthotic; AFO (Ankle Foot Orthosis) (rigid posterior brace right); Right; Custom      3. Acquired right foot drop  M21.371 Orthotics and Prosthetics Order Orthotic; AFO (Ankle Foot Orthosis) (rigid posterior brace right); Right; Custom          I have explained to Se about the conditions.  We discussed the underlying contributing factors to the condition as well as both conservative and surgical treatment options along with expected length of recovery.  At this time, the patient was referred to [facility] for custom posterior AFO brace to help assist in keeping his right foot dorsiflexed to prevent further stumbling and falls.  The patient was also recommended to wear a silicone shoe insert to better cushion the bottom of the foot and prevent further callus formation.    Se verbalized agreement with and understanding of the rational for the diagnosis and treatment plan.  All questions were answered to best of my ability and the patient's satisfaction. The patient was advised to contact the clinic with any questions that may arise after the clinic visit.      Disclaimer: This note consists of symbols derived from keyboarding, dictation and/or voice recognition software. As a result, there may be errors in the script that have gone undetected. Please consider this when interpreting information found in this chart.       DICK Strong D.P.M., [email or web address].

## 2024-01-22 NOTE — LETTER
1/22/2024         RE: Se Quinonez  [address] [facility] [address]        Dear Colleague,    Thank you for referring your patient, Se Quinonez, to the [facility] [place]. Please see a copy of my visit note below.    PATIENT HISTORY:  Se Quinonez is a 68 year old male with type 2 diabetes mellitus with peripheral neuropathy who presents to clinic as a self referral with a chief complaint of weakness to the right lower extremity.  The patient relates occasional stumbling on the right foot due to the weakness.  The patient is seen with their wife.  The patient also relates having had developed painful calluses on the balls of both feet.   Any previous notes and studies that pertain to the patient's condition were reviewed.    Pertinent medical, surgical and family history was reviewed in the [facility] chart.    Past Medical History:   Past Medical History:   Diagnosis Date     Arthritis 04/05/2023    Rheumatoid factor confirmed     Cancer (H) 09/2923    Prostate     Crohn disease (H)      Diabetes (H) 08/21/2023     Herpes zoster     Created by Conversion  Replacement Utility updated for latest IMO load     Postherpetic polyneuropathy     Created by Conversion        Medications:   Current Outpatient Medications:      abiraterone (ZYTIGA) 250 MG tablet, Take 1,000 mg by mouth daily Taking 2 250mg daily, Disp: , Rfl:      blood glucose (NO BRAND SPECIFIED) test strip, Use to test blood sugar one time daily or as directed. To accompany: Blood Glucose Monitor Brands: per insurance., Disp: 100 strip, Rfl: 6     blood glucose monitoring (NO BRAND SPECIFIED) meter device kit, Use to test blood sugar one time daily or as directed. Preferred blood glucose meter OR supplies to accompany: Blood Glucose Monitor Brands: per insurance., Disp: 1 kit, Rfl: 0     famotidine (PEPCID) 20 MG tablet, Take 20 mg by mouth 2 times daily, Disp: , Rfl:      furosemide (LASIX) 20 MG tablet, Take 1  tablet (20 mg) by mouth daily as needed (legs edema), Disp: 30 tablet, Rfl: 1     gabapentin (NEURONTIN) 300 MG capsule, Take 1 capsule (300 mg) by mouth at bedtime, Disp: 90 capsule, Rfl: 1     leuprolide (LUPRON DEPOT) 22.5 MG kit, Inject 22.5 mg into the muscle every 3 months, Disp: , Rfl:      metFORMIN (GLUCOPHAGE XR) 500 MG 24 hr tablet, Take 1 tablet (500 mg) by mouth daily (with dinner), Disp: 90 tablet, Rfl: 1     multivitamin (CENTRUM SILVER) tablet, Take 1 tablet by mouth 2 times daily, Disp: , Rfl:      tamsulosin (FLOMAX) 0.4 MG capsule, Take 1 capsule (0.4 mg) by mouth daily, Disp: 90 capsule, Rfl: 3     thin (NO BRAND SPECIFIED) lancets, Use with lanceting device. To accompany: Blood Glucose Monitor Brands: per insurance., Disp: 100 each, Rfl: 6     UNABLE TO FIND, every 6 months MEDICATION NAME: Rituxin, Disp: , Rfl:      gabapentin (NEURONTIN) 100 MG capsule, Take 3 capsules (300 mg) by mouth at bedtime, Disp: 90 capsule, Rfl: 1     olaparib (LYNPARZA) 150 MG tablet, , Disp: , Rfl:      predniSONE (DELTASONE) 5 MG tablet, Take 1 tablet (5 mg) by mouth 2 times daily (Patient taking differently: Take 5 mg by mouth daily), Disp: , Rfl:      STATIN NOT PRESCRIBED (INTENTIONAL), Please choose reason not prescribed from choices below., Disp: , Rfl:      Allergies:  No Known Allergies    Vitals: There were no vitals taken for this visit.  BMI= There is no height or weight on file to calculate BMI.    LOWER EXTREMITY PHYSICAL EXAM    Dermatologic: Skin is intact to right and left lower extremity without significant lesions, rash or abrasion.   Mild callus formation noted developing under the first metatarsal head bilaterally.  Lack of fat pad atrophy noted to the balls of both feet.     Vascular: DP & PT pulses are intact & regular on the right and left.   CFT and skin temperature is normal to the right and left lower extremity.     Neurologic: Lower extremity sensation is intact to light touch.  Noted  evidence of weakness in the right lower extremity.        Musculoskeletal: Patient is ambulatory without assistive device or brace.  No gross ankle deformity noted.  No foot or ankle joint effusion is noted.              ASSESSMENT / PLAN:     ICD-10-CM    1. Type II diabetes mellitus with peripheral autonomic neuropathy (H)  E11.43 Orthotics and Prosthetics Order Orthotic; AFO (Ankle Foot Orthosis) (rigid posterior brace right); Right; Custom      2. Rheumatoid arthritis involving multiple sites with positive rheumatoid factor (H)  M05.79 Orthotics and Prosthetics Order Orthotic; AFO (Ankle Foot Orthosis) (rigid posterior brace right); Right; Custom      3. Acquired right foot drop  M21.371 Orthotics and Prosthetics Order Orthotic; AFO (Ankle Foot Orthosis) (rigid posterior brace right); Right; Custom          I have explained to Se about the conditions.  We discussed the underlying contributing factors to the condition as well as both conservative and surgical treatment options along with expected length of recovery.  At this time, the patient was referred to [facility] for custom posterior AFO brace to help assist in keeping his right foot dorsiflexed to prevent further stumbling and falls.  The patient was also recommended to wear a silicone shoe insert to better cushion the bottom of the foot and prevent further callus formation.    Se verbalized agreement with and understanding of the rational for the diagnosis and treatment plan.  All questions were answered to best of my ability and the patient's satisfaction. The patient was advised to contact the clinic with any questions that may arise after the clinic visit.      Disclaimer: This note consists of symbols derived from keyboarding, dictation and/or voice recognition software. As a result, there may be errors in the script that have gone undetected. Please consider this when interpreting information found in this  chart.       DICK Strong D.P.M., CHRISTI.F.A.S.      Again, thank you for allowing me to participate in the care of your patient.        Sincerely,        Dayton Strong DPM

## 2024-01-26 ENCOUNTER — TELEPHONE (OUTPATIENT)
Dept: FAMILY MEDICINE | Facility: CLINIC | Age: 69
End: 2024-01-26

## 2024-01-26 ENCOUNTER — THERAPY VISIT (OUTPATIENT)
Dept: PHYSICAL THERAPY | Facility: CLINIC | Age: 69
End: 2024-01-26
Attending: NURSE PRACTITIONER
Payer: MEDICARE

## 2024-01-26 ENCOUNTER — TRANSFERRED RECORDS (OUTPATIENT)
Dept: HEALTH INFORMATION MANAGEMENT | Facility: CLINIC | Age: 69
End: 2024-01-26

## 2024-01-26 DIAGNOSIS — R29.898 BILATERAL LEG WEAKNESS: Primary | ICD-10-CM

## 2024-01-26 DIAGNOSIS — R26.81 GAIT INSTABILITY: ICD-10-CM

## 2024-01-26 PROCEDURE — 97112 NEUROMUSCULAR REEDUCATION: CPT | Mod: GP | Performed by: PHYSICAL THERAPIST

## 2024-01-26 NOTE — TELEPHONE ENCOUNTER
Received call from Patient.  States that he has been to a specialist.  Thinks that his decrease in mobility could be from Guillain [place] syndrome  Is talking about doing EMG, nerve biopsy and spinal tap.  If positive would start Patient on steroids.  Patient is feeling over whelmed and would like to talk with PCP to get information and discuss what is going on with him.  Appointment scheduled on 1/31/24 at 0910 with Ashley Akhtar.  Abdon Guillermo RN

## 2024-01-30 ENCOUNTER — MYC MEDICAL ADVICE (OUTPATIENT)
Dept: FAMILY MEDICINE | Facility: CLINIC | Age: 69
End: 2024-01-30
Payer: MEDICARE

## 2024-01-31 ENCOUNTER — OFFICE VISIT (OUTPATIENT)
Dept: FAMILY MEDICINE | Facility: CLINIC | Age: 69
End: 2024-01-31
Payer: MEDICARE

## 2024-01-31 ENCOUNTER — MYC MEDICAL ADVICE (OUTPATIENT)
Dept: FAMILY MEDICINE | Facility: CLINIC | Age: 69
End: 2024-01-31

## 2024-01-31 VITALS
RESPIRATION RATE: 16 BRPM | WEIGHT: 165.9 LBS | TEMPERATURE: 97.9 F | BODY MASS INDEX: 26.78 KG/M2 | SYSTOLIC BLOOD PRESSURE: 110 MMHG | OXYGEN SATURATION: 100 % | DIASTOLIC BLOOD PRESSURE: 70 MMHG | HEART RATE: 95 BPM

## 2024-01-31 DIAGNOSIS — C79.51 MALIGNANT NEOPLASM METASTATIC TO BONE (H): ICD-10-CM

## 2024-01-31 DIAGNOSIS — M54.16 LUMBAR RADICULOPATHY: ICD-10-CM

## 2024-01-31 DIAGNOSIS — R60.0 EDEMA OF BOTH LEGS: ICD-10-CM

## 2024-01-31 DIAGNOSIS — G62.0 PERIPHERAL NEUROPATHY DUE TO CHEMOTHERAPY (H): ICD-10-CM

## 2024-01-31 DIAGNOSIS — E11.43 TYPE II DIABETES MELLITUS WITH PERIPHERAL AUTONOMIC NEUROPATHY (H): Primary | ICD-10-CM

## 2024-01-31 DIAGNOSIS — K50.919 CROHN'S DISEASE WITH COMPLICATION, UNSPECIFIED GASTROINTESTINAL TRACT LOCATION (H): ICD-10-CM

## 2024-01-31 DIAGNOSIS — T45.1X5A PERIPHERAL NEUROPATHY DUE TO CHEMOTHERAPY (H): ICD-10-CM

## 2024-01-31 DIAGNOSIS — M05.742 RHEUMATOID ARTHRITIS INVOLVING BOTH HANDS WITH POSITIVE RHEUMATOID FACTOR (H): ICD-10-CM

## 2024-01-31 DIAGNOSIS — M05.741 RHEUMATOID ARTHRITIS INVOLVING BOTH HANDS WITH POSITIVE RHEUMATOID FACTOR (H): ICD-10-CM

## 2024-01-31 PROCEDURE — 99214 OFFICE O/P EST MOD 30 MIN: CPT | Performed by: NURSE PRACTITIONER

## 2024-01-31 ASSESSMENT — PAIN SCALES - GENERAL: PAINLEVEL: NO PAIN (0)

## 2024-01-31 NOTE — PROGRESS NOTES
Assessment & Plan     Type II diabetes mellitus with peripheral autonomic neuropathy (H)  Lab Results   Component Value Date    A1C 5.1 11/27/2023    A1C 6.6 08/21/2023    A1C 5.3 07/08/2022    A1C 5.8 07/30/2018    -well controlled     Edema of both legs    - Compression Sleeve/Stocking Order for DME - ONLY FOR DME    Peripheral neuropathy due to chemotherapy  (H24)  -discussed recent EMG study results and his visit with neurologist  -recommended to schedule MRI  - MR Lumbar Spine w/o Contrast; Future    Malignant neoplasm metastatic to bone (H)  -stable, following oncology     Crohn's disease with complication, unspecified gastrointestinal tract location (H)  -stable, no new concerns     Lumbar radiculopathy    - MR Lumbar Spine w/o Contrast; Future        Subjective   Se is a 68 year old, presenting for the following health issues:  Mobility Issues        1/31/2024     7:41 AM   Additional Questions   Roomed by chiquita   Accompanied by wife         1/31/2024     7:41 AM   Patient Reported Additional Medications   Patient reports taking the following new medications Lynparza 150mg daily- will increase in one week to 2 tablets , prednisone 5mg daily , vitron-c 65 mcg      States that he has been to a specialist.  Thinks that his decrease in mobility could be from Guillain [place] syndrome  Is talking about doing EMG, nerve biopsy and spinal tap.    History of Present Illness       Reason for visit:  1) mobility issues (continuing to regress), my oncologist is suggesting the possibility of Guillain-Barré syndrome, 3) 2nd opinion for recommened spinal tap & nerve biopsy  Symptom onset:  More than a month  Symptom intensity:  Severe  Symptom progression:  Worsening  Had these symptoms before:  Yes  What makes it worse:  No  What makes it better:  No    He eats 2-3 servings of fruits and vegetables daily.He consumes 0 sweetened beverage(s) daily.He exercises with enough effort to increase his heart rate 30 to 60  minutes per day.  He exercises with enough effort to increase his heart rate 7 days per week.   He is taking medications regularly.         Review of Systems  Constitutional, HEENT, cardiovascular, pulmonary, gi and gu systems are negative, except as otherwise noted.      Objective    /70   Pulse 95   Temp 97.9  F (36.6  C) (Tympanic)   Resp 16   Wt 75.3 kg (165 lb 14.4 oz)   SpO2 100%   BMI 26.78 kg/m    Body mass index is 26.78 kg/m .  Physical Exam   GENERAL: alert and no distress  EYES: Eyes grossly normal to inspection, PERRL and conjunctivae and sclerae normal  MS: no gross musculoskeletal defects noted, no edema  SKIN: no suspicious lesions or rashes  NEURO:mentation intact and speech normal  PSYCH: mentation appears normal, affect normal/bright            Signed Electronically by: FARNKO Kingston CNP

## 2024-01-31 NOTE — PATIENT INSTRUCTIONS
There is electrophysiologic evidence of a subacute-chronic patchy neurogenic process involving right L3-S1 and left L5-S1 distributions. These findings can be seen in bilateral lumbosacral plexopathy vs multilevel lumbosacral radiculopathies. However, the absent sensory nerve action potentials  and sparing of lumbar paraspinal muscles make the latter less likely.     bilateral lumbosacral plexopathy was ruled out    EMG study did not show any findings consistent with [place] syndrome

## 2024-02-01 RX ORDER — PREDNISONE 5 MG/1
5 TABLET ORAL DAILY
Status: SHIPPED | DISCHARGE
Start: 2024-02-01

## 2024-02-01 NOTE — TELEPHONE ENCOUNTER
See Loccit (ML4D) message, routing to PCP for review.    Thuy Pope RN  [facility]

## 2024-02-02 ENCOUNTER — THERAPY VISIT (OUTPATIENT)
Dept: PHYSICAL THERAPY | Facility: CLINIC | Age: 69
End: 2024-02-02
Attending: NURSE PRACTITIONER
Payer: MEDICARE

## 2024-02-02 DIAGNOSIS — R29.898 BILATERAL LEG WEAKNESS: Primary | ICD-10-CM

## 2024-02-02 DIAGNOSIS — R26.81 GAIT INSTABILITY: ICD-10-CM

## 2024-02-02 PROCEDURE — 97110 THERAPEUTIC EXERCISES: CPT | Mod: GP | Performed by: PHYSICAL THERAPIST

## 2024-02-05 ENCOUNTER — MYC MEDICAL ADVICE (OUTPATIENT)
Dept: FAMILY MEDICINE | Facility: CLINIC | Age: 69
End: 2024-02-05
Payer: MEDICARE

## 2024-02-05 ENCOUNTER — MYC REFILL (OUTPATIENT)
Dept: FAMILY MEDICINE | Facility: CLINIC | Age: 69
End: 2024-02-05
Payer: MEDICARE

## 2024-02-05 DIAGNOSIS — R60.0 EDEMA OF BOTH LEGS: ICD-10-CM

## 2024-02-05 DIAGNOSIS — G62.9 PERIPHERAL POLYNEUROPATHY: Primary | ICD-10-CM

## 2024-02-05 RX ORDER — PREGABALIN 25 MG/1
25 CAPSULE ORAL 2 TIMES DAILY
Qty: 60 CAPSULE | Refills: 0 | Status: SHIPPED | OUTPATIENT
Start: 2024-02-05 | End: 2024-03-08

## 2024-02-05 RX ORDER — FUROSEMIDE 20 MG
20 TABLET ORAL DAILY PRN
Qty: 30 TABLET | Refills: 1 | Status: SHIPPED | OUTPATIENT
Start: 2024-02-05 | End: 2024-04-01

## 2024-02-05 NOTE — TELEPHONE ENCOUNTER
Requested Prescriptions   Pending Prescriptions Disp Refills    furosemide (LASIX) 20 MG tablet 30 tablet 1     Sig: Take 1 tablet (20 mg) by mouth daily as needed (legs edema)       Diuretics (Including Combos) Protocol Passed - 2/5/2024  7:59 AM        Passed - Blood pressure under 140/90 in past 12 months     BP Readings from Last 3 Encounters:   01/31/24 110/70   12/06/23 108/68   11/08/23 123/70                 Passed - Recent (12 mo) or future (30 days) visit within the authorizing provider's specialty     The patient must have completed an in-person or virtual visit within the past 12 months or has a future visit scheduled within the next 90 days with the authorizing provider s specialty.  Urgent care and e-visits do not quality as an office visit for this protocol.          Passed - Medication is active on med list        Passed - Medication indicated for associated diagnosis     Medication is associated with one or more of the following diagnoses:     Edema   Hypertension   Heart Failure   Meniere's Disease          Passed - Has GFR on file in past 12 months and most recent value is normal        Passed - Medication indicated for associated diagnosis     Medication is associated with one or more of the following diagnoses:     Edema   Hypertension   Hypercalcemia   Heart Failure   Chronic Kidney Disease (CKD)          Passed - Patient is age 18 or older

## 2024-02-09 ENCOUNTER — THERAPY VISIT (OUTPATIENT)
Dept: PHYSICAL THERAPY | Facility: CLINIC | Age: 69
End: 2024-02-09
Attending: NURSE PRACTITIONER
Payer: MEDICARE

## 2024-02-09 ENCOUNTER — TRANSFERRED RECORDS (OUTPATIENT)
Dept: HEALTH INFORMATION MANAGEMENT | Facility: CLINIC | Age: 69
End: 2024-02-09

## 2024-02-09 DIAGNOSIS — R26.81 GAIT INSTABILITY: ICD-10-CM

## 2024-02-09 DIAGNOSIS — R29.898 BILATERAL LEG WEAKNESS: Primary | ICD-10-CM

## 2024-02-09 PROCEDURE — 97110 THERAPEUTIC EXERCISES: CPT | Mod: GP | Performed by: PHYSICAL THERAPIST

## 2024-02-09 PROCEDURE — 97116 GAIT TRAINING THERAPY: CPT | Mod: GP | Performed by: PHYSICAL THERAPIST

## 2024-02-10 ENCOUNTER — HOSPITAL ENCOUNTER (OUTPATIENT)
Dept: MRI IMAGING | Facility: CLINIC | Age: 69
Discharge: HOME | End: 2024-02-10
Attending: NURSE PRACTITIONER | Admitting: NURSE PRACTITIONER
Payer: MEDICARE

## 2024-02-10 DIAGNOSIS — T45.1X5A PERIPHERAL NEUROPATHY DUE TO CHEMOTHERAPY (H): ICD-10-CM

## 2024-02-10 DIAGNOSIS — M54.16 LUMBAR RADICULOPATHY: ICD-10-CM

## 2024-02-10 DIAGNOSIS — G62.0 PERIPHERAL NEUROPATHY DUE TO CHEMOTHERAPY (H): ICD-10-CM

## 2024-02-10 PROCEDURE — 72148 MRI LUMBAR SPINE W/O DYE: CPT | Mod: MG

## 2024-02-12 ENCOUNTER — MYC MEDICAL ADVICE (OUTPATIENT)
Dept: FAMILY MEDICINE | Facility: CLINIC | Age: 69
End: 2024-02-12
Payer: MEDICARE

## 2024-02-13 DIAGNOSIS — M48.061 FORAMINAL STENOSIS OF LUMBAR REGION: Primary | ICD-10-CM

## 2024-02-13 DIAGNOSIS — M21.371 FOOT DROP, RIGHT: ICD-10-CM

## 2024-02-21 ENCOUNTER — VIRTUAL VISIT (OUTPATIENT)
Dept: FAMILY MEDICINE | Facility: CLINIC | Age: 69
End: 2024-02-21
Payer: MEDICARE

## 2024-02-21 DIAGNOSIS — E11.43 TYPE II DIABETES MELLITUS WITH PERIPHERAL AUTONOMIC NEUROPATHY (H): Primary | ICD-10-CM

## 2024-02-21 DIAGNOSIS — M48.061 FORAMINAL STENOSIS OF LUMBAR REGION: ICD-10-CM

## 2024-02-21 DIAGNOSIS — T45.1X5A PERIPHERAL NEUROPATHY DUE TO CHEMOTHERAPY (H): ICD-10-CM

## 2024-02-21 DIAGNOSIS — G62.0 PERIPHERAL NEUROPATHY DUE TO CHEMOTHERAPY (H): ICD-10-CM

## 2024-02-21 PROCEDURE — 99214 OFFICE O/P EST MOD 30 MIN: CPT | Mod: 95 | Performed by: NURSE PRACTITIONER

## 2024-02-21 NOTE — PROGRESS NOTES
Se is a 68 year old who is being evaluated via a billable video visit.      How would you like to obtain your AVS? MyChart  If the video visit is dropped, the invitation should be resent by: Text to cell phone: [phone]  Will anyone else be joining your video visit? No    Assessment & Plan     Type II diabetes mellitus with peripheral autonomic neuropathy (H)  -repeat labs for diabetes recheck   -continue Metformin and diabetic diet   - Hemoglobin A1c; Future  - Basic metabolic panel  (Ca, Cl, CO2, Creat, Gluc, K, Na, BUN); Future  - PRIMARY CARE FOLLOW-UP SCHEDULING; Future    Peripheral neuropathy due to chemotherapy  (H24)  -he saw neurologist who advised spinal tap and possible biopsy, he still unsure if he should proceed with this plan and I advised Se to contact his neurologist   - PRIMARY CARE FOLLOW-UP SCHEDULING; Future    Foraminal stenosis of lumbar region  -discussed recent lumbar spine MRI results, he has severe foraminal stenosis at L3-L4 level bilaterally, which I advised patient can cause weakness in his lower extremities, but still does not explain peroneal nerve neuropathy and right foot drop. He is scheduled with spine specialist to discuss MRI results and plans for treatment options    -we also reviewed recent EMG study results:showed evidence of a subacute-chronic patchy neurogenic process involving right L3-S1 and left L5-S1 distributions. These findings can be seen in bilateral lumbosacral plexopathy vs multilevel lumbosacral radiculopathies. However, the absent sensory nerve action potentials  and sparing of lumbar paraspinal muscles make the latter less likely. He had lumbosacral plexus MRI done which did not show any evidence of mass or abnormal enhancement involving the bilateral lumbosacral plexuses.         Subjective   Se is a 68 year old, presenting for the following health issues:  Results (Discuss MRI Lumbar MRI Results )      2/21/2024     1:44 PM   Additional Questions    Roomed by Arley RAMOS CMA   Accompanied by self     History of Present Illness       Reason for visit:  Discussion of Lumbar MRI results and next steps    He eats 0-1 servings of fruits and vegetables daily.He consumes 0 sweetened beverage(s) daily.He exercises with enough effort to increase his heart rate 20 to 29 minutes per day.  He exercises with enough effort to increase his heart rate 3 or less days per week.   He is taking medications regularly.             Review of Systems  Constitutional, HEENT, cardiovascular, pulmonary, gi and gu systems are negative, except as otherwise noted.      Objective    Vitals - Patient Reported  Pain Score: No Pain (0)        Physical Exam   GENERAL: alert and no distress  EYES: Eyes grossly normal to inspection.  No discharge or erythema, or obvious scleral/conjunctival abnormalities.  RESP: No audible wheeze, cough, or visible cyanosis.    SKIN: Visible skin clear. No significant rash, abnormal pigmentation or lesions.  NEURO: Cranial nerves grossly intact.  Mentation and speech appropriate for age.  PSYCH: Appropriate affect, tone, and pace of words        Video-Visit Details    Type of service:  Video Visit   Video Start Time: 5.22 PM  Video End Time:5.48 PM    Originating Location (pt. Location): Home    Distant Location (provider location):  On-site  Platform used for Video Visit: Crystal  Signed Electronically by: FRANKO Kingston CNP

## 2024-02-23 ENCOUNTER — THERAPY VISIT (OUTPATIENT)
Dept: PHYSICAL THERAPY | Facility: CLINIC | Age: 69
End: 2024-02-23
Attending: NURSE PRACTITIONER
Payer: MEDICARE

## 2024-02-23 DIAGNOSIS — R29.898 BILATERAL LEG WEAKNESS: Primary | ICD-10-CM

## 2024-02-23 DIAGNOSIS — R26.81 GAIT INSTABILITY: ICD-10-CM

## 2024-02-23 PROCEDURE — 97110 THERAPEUTIC EXERCISES: CPT | Mod: GP | Performed by: PHYSICAL THERAPIST

## 2024-02-23 PROCEDURE — 97112 NEUROMUSCULAR REEDUCATION: CPT | Mod: GP | Performed by: PHYSICAL THERAPIST

## 2024-02-23 NOTE — PROGRESS NOTES
[facility]                                                                                   OUTPATIENT PHYSICAL THERAPY    PLAN OF TREATMENT FOR OUTPATIENT REHABILITATION   Patient's Last Name, First Name, Se Monterroso YOB: 1955   Provider's Name   [facility]   Medical Record No.  [id number]     Onset Date: 05/01/23 (approx date)  Start of Care Date: 01/02/24     Medical Diagnosis:  gait instability, B LE weakness, neuropathy      PT Treatment Diagnosis:    Plan of Treatment  Frequency/Duration: 1x/wk x 8wks    Certification date from 02/27/24 to 04/23/24         See note for plan of treatment details and functional goals     Kris Hoenk, PT                         I CERTIFY THE NEED FOR THESE SERVICES FURNISHED UNDER        THIS PLAN OF TREATMENT AND WHILE UNDER MY CARE     (Physician attestation of this document indicates review and certification of the therapy plan).              Referring Provider:  Ashley Akhtar    Initial Assessment  See Epic Evaluation- Start of Care Date: 01/02/24 02/23/24 0500   Appointment Info   Signing clinician's name / credentials KrisHoenk,PT   Visits Used 8   Medical Diagnosis gait instability, B LE weakness, neuropathy   Quick Adds Certification   Progress Note/Certification   Start of Care Date 01/02/24   Onset of illness/injury or Date of Surgery 05/01/23  (approx date)   Therapy Frequency 2x/wk x 4wks, 1x/wk x4wk   Predicted Duration 8wks   Certification date from 02/27/24   Certification date to 04/23/24   GOALS   PT Goals 2;3;4;5   PT Goal 1   Goal Identifier 1 still valid, extend date to 4/9   Goal Description will obtain AFO  in order to ambulate indep household distance w/o asst device in 6wk   Goal Progress has AFO, using device   Target Date 02/13/24   PT Goal 2   Goal Identifier 2still valid, extend date to 4/9   Goal Description pt will be able to do reciprocal  "stairs up, (not down) in 8wk   Goal Progress mostly one at a time, can do both,  has to pull on arms a lot   Target Date 02/27/24   PT Goal 3   Goal Identifier 3 still valid, extend date to 4/9   Goal Description pt will be able to walk community distances in 8wk for shopping, social outings   Goal Progress must have cart to use as his walker   Target Date 02/27/24   PT Goal 4   Goal Identifier 4 new   Goal Description will be kwaku to  walk in his hoome w/o assistve device in 8wk   Target Date 04/23/24   PT Goal 5   Goal Identifier 5   Goal Description pt will be kwaku to walk with cane on uneven ground - grass, parking lot in 8wk   Target Date 04/23/24   Subjective Report   Subjective Report had MRI of lumbar spine, does not have LBP, trouble putting AFO on, walker on first floor, cane on 2nd floor,   Objective Measures   Objective Measures Objective Measure 1;Objective Measure 2   Objective Measure 1   Objective Measure MMT hip flex L 5, R 4+, hip abd R 2, knee exten R 5-, L 5, , knee flex R 4+, L 5, DF 0/5 R   Treatment Interventions (PT)   Interventions Therapeutic Procedure/Exercise;Gait Training;Neuromuscular Re-education   Therapeutic Procedure/Exercise   Therapeutic Procedures: strength, endurance, ROM, flexibillity minutes (42300) 15   Ther Proc 1 - Details up/down 6\" steps x2 sets,6\" fwd step up x10B,  standing hip abd x15 B, sdie step to mini lunge x5B   Skilled Intervention strength ex to begin HEP, improve walking, decrease falls   Patient Response/Progress R hip weak   Neuromuscular Re-education   Neuromuscular re-ed of mvmt, balance, coord, kinesthetic sense, posture, proprioception minutes (18492) 30   Neuro Re-ed 1 - Details walking with cane 350ft, getting tired, stanidng balance FT horiz head turn x5 each, vertical turns x5 each, FA4\" bicep curl 3# x10, overhead press 3# x10, grey tubing rows x15, shld extn x15 - cues for core stablity   Skilled Intervention balance and NMR for weak  mm, improve " walking, balance   Education   Learner/Method Patient;Demonstration;No Barriers to Learning   Plan   Home program ex listed   Updates to plan of care needs AFO   Plan for next session cont to work strength, balance, gait 1x/wk x8wks   Total Session Time   Timed Code Treatment Minutes 45   Total Treatment Time (sum of timed and untimed services) 45     M [facility]  Kris Hoenk  PT  [facility]  [address] [place] [address]  [email or web address]   Office: [phone]  Voicemail: [phone]

## 2024-02-26 ENCOUNTER — OFFICE VISIT (OUTPATIENT)
Dept: PHYSICAL MEDICINE AND REHAB | Facility: CLINIC | Age: 69
End: 2024-02-26
Attending: NURSE PRACTITIONER
Payer: MEDICARE

## 2024-02-26 VITALS
WEIGHT: 167 LBS | OXYGEN SATURATION: 98 % | HEART RATE: 82 BPM | DIASTOLIC BLOOD PRESSURE: 60 MMHG | SYSTOLIC BLOOD PRESSURE: 104 MMHG | HEIGHT: 68 IN | BODY MASS INDEX: 25.31 KG/M2

## 2024-02-26 DIAGNOSIS — M48.061 FORAMINAL STENOSIS OF LUMBAR REGION: ICD-10-CM

## 2024-02-26 DIAGNOSIS — M21.371 FOOT DROP, RIGHT: ICD-10-CM

## 2024-02-26 DIAGNOSIS — G54.1 LUMBOSACRAL PLEXOPATHY: Primary | ICD-10-CM

## 2024-02-26 PROCEDURE — 99204 OFFICE O/P NEW MOD 45 MIN: CPT | Performed by: STUDENT IN AN ORGANIZED HEALTH CARE EDUCATION/TRAINING PROGRAM

## 2024-02-26 RX ORDER — ACETAMINOPHEN 500 MG
TABLET ORAL
COMMUNITY

## 2024-02-26 RX ORDER — PREDNISOLONE 5 MG/1
TABLET ORAL
COMMUNITY
Start: 2023-10-01 | End: 2024-06-12

## 2024-02-26 RX ORDER — IBUPROFEN 200 MG
TABLET ORAL
COMMUNITY
End: 2024-03-08

## 2024-02-26 ASSESSMENT — PAIN SCALES - GENERAL: PAINLEVEL: NO PAIN (0)

## 2024-02-26 NOTE — PROGRESS NOTES
ASSESSMENT:  Se Quinonez is a 68 year old male presents for consultation at the request of Ashley Akhtar who presents today for new patient evaluation of :    Diagnoses and all orders for this visit:  Lumbosacral plexopathy  -     Adult Neurology  Referral; Future  Foraminal stenosis of lumbar region  -     Spine  Referral  Foot drop, right  -     Spine  Referral  -     Adult Neurology  Referral; Future       Patient is neurologically intact on exam. No myelopathic or red flag symptoms.    Patient presents with weakness and foot drop of the right lower extremity.  He denies any significant back or radicular pain.  We reviewed his EMG and MRI results, EMG shows findings suggestive of lumbosacral plexopathy rather than lumbar radiculopathy particularly with absent SNAP and intact paraspinal activity in the lumbar region, and while his MRI does show moderate to severe foraminal stenosis of the L3-4 level he does not have any radicular findings in the L3 or L4 nerve distributions.  We discussed that the weakness in his ankle dorsiflexors on the right side would suggest involvement of the L5, lumbosacral plexus, or peroneal nerve.  Since the lumbosacral plexus is the most likely given his EMG findings and history of pelvic radiation, I recommended continued follow-up with neurology to see what the treatment options are as spinal interventions would likely be of limited benefit.  Patient requested referral to neurology in [place] as this would be easier for him to drive.  I advised that while we can make that referral, if he requires a neuromuscular specialist that may still entail going down to the main [place] campus.  Patient endorsed understanding, and we remain available to help with any further spine care as needed.    PLAN:  Reviewed spine anatomy and disease process. Discussed diagnosis and treatment options with the patient today. A shared decision making model  was used. The patient's values and choices were respected. The following represents what was discussed and decided upon by the provider and the patient.    1. DIAGNOSTIC TESTS  No new imaging orders at this time.    2. PHYSICAL THERAPY  Patient is already in PT or has a pending referral to begin soon.  Discussed the importance of core strengthening, ROM, stretching exercises with the patient and how each of these entities is important in decreasing pain.  Explained to the patient that the purpose of physical therapy is to teach the patient a home exercise program.  These exercises need to be performed every day in order to decrease pain and prevent future occurrences of pain.  Likened it to brushing one's teeth.      3. MEDICATIONS:  Discussed multiple medication options today with patient. Discussed risks, side effects, and proper use of medications. Patient verbalized understanding.  No new medications ordered at this visit.    4. INTERVENTIONS:  Interventional treatments are not indicated for patient's presentation.  Advised patient that interventional treatments tend to be of limited benefit for weakness.  In addition, his symptoms suggest an extraspinal origin.  If back or radicular pain become a larger component of the presentation, can revisit interventional treatment options.    5. OTHER REFERRALS:  No other referrals at this time.    6. FOLLOW-UP  As needed.    Advised patient to call the Spine Center if symptoms worsen or you have problems controlling bladder and bowel function.   ______________________________________________________________________    SUBJECTIVE:   Se Quinonez  is a 68 year old male who presents today for new patient evaluation.    Patient presents with a primary concern of right-sided foot drop.  He reports a history of prostate cancer with radiation therapy, and in the summer 2023 he began noticing progressive weakness of the right foot and ankle.  He initially started with  physical therapy in the summer and fall 2023 and was given an AFO brace for stability of the right ankle.  Since then he has seen neurology for workup of potential lumbosacral plexopathy given the history of pelvic radiation, and had MR imaging and EMG performed.  EMG suggested lumbosacral plexopathy versus multilevel lumbar radiculopathy, though plexopathy was favored given specific findings of absent sensory nerve action potentials and intact paraspinal activity.  He also had an MRI of the lumbar spine done which shows moderate to severe foraminal stenosis of L3-4, so was referred to spine health to see if there might be a spinal component to his presentation.    Patient reports after his EMG appointment he was recommended for nerve biopsy and possible steroid treatment, but he elected to defer at that time pending today's evaluation.    Patient denies any significant back or leg pain.  Denies any shooting down the legs to the foot, and no significant weakness or pain of the thighs or hip flexors.  No new bladder or bowel incontinence.    No prior back surgeries    -Treatment to Date: As above      Oswestry (ELOY) Questionnaire        2/21/2024     4:24 PM   OSWESTRY DISABILITY INDEX   Count 9   Sum 15   Oswestry Score (%) 33.33 %       Neck Disability Index:      2/21/2024     4:27 PM   Neck Disability Index (  Shivam H. and Brianna C. 1991. All rights reserved.; used with permission)   SECTION 1 - PAIN INTENSITY 0   SECTION 2 - PERSONAL CARE 0   SECTION 3 - LIFTING 4   SECTION 4 - READING 0   SECTION 5 - HEADACHES 0   SECTION 6 - CONCENTRATION 1   SECTION 7 - WORK 2   SECTION 8 - DRIVING 0   SECTION 9 - SLEEPING 0   Count 9   Sum 7   Raw Score: /50 7.78   Neck Disability Index Score: (%) 15.56 %              -Medications:    Current Outpatient Medications   Medication    abiraterone (ZYTIGA) 250 MG tablet    acetaminophen (TYLENOL) 500 MG tablet    blood glucose (NO BRAND SPECIFIED) test strip    blood glucose  monitoring (NO BRAND SPECIFIED) meter device kit    famotidine (PEPCID) 20 MG tablet    furosemide (LASIX) 20 MG tablet    hydrocortisone sodium succinate PF (SOLU-CORTEF) injection    ibuprofen (ADVIL/MOTRIN) 200 MG tablet    leuprolide (LUPRON DEPOT) 22.5 MG kit    metFORMIN (GLUCOPHAGE XR) 500 MG 24 hr tablet    multivitamin (CENTRUM SILVER) tablet    prednisoLONE 5 MG tablet    predniSONE (DELTASONE) 5 MG tablet    pregabalin (LYRICA) 25 MG capsule    tamsulosin (FLOMAX) 0.4 MG capsule    thin (NO BRAND SPECIFIED) lancets    UNABLE TO FIND    gabapentin (NEURONTIN) 300 MG capsule    olaparib (LYNPARZA) 150 MG tablet    STATIN NOT PRESCRIBED (INTENTIONAL)     No current facility-administered medications for this visit.       No Known Allergies    Past Medical History:   Diagnosis Date    Arthritis 04/05/2023    Rheumatoid factor confirmed    Cancer (H) 09/2923    Prostate    Crohn disease (H)     Diabetes (H) 08/21/2023    Herpes zoster     Created by Conversion  Replacement Utility updated for latest IMO load    Postherpetic polyneuropathy     Created by Conversion         Patient Active Problem List   Diagnosis    Crohn's disease with complication, unspecified gastrointestinal tract location (H)    Malignant tumor of prostate (H)    Ulceration of intestine    Raised prostate specific antigen    Hemorrhoids    Diverticular disease of large intestine    Constipation    Rheumatoid arthritis (H)    Malignant neoplasm metastatic to bone (H)    Malignant 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, unspecified lymph node region (H)    Bilateral leg weakness    Gait instability    Peripheral neuropathy due to chemotherapy  (H24)       Past Surgical History:   Procedure Laterality Date    COLONOSCOPY      ENT SURGERY  08/08/2023    partial right nasal polyp removal    EYE SURGERY      OTHER SURGICAL HISTORY      tonsillectomy       Family History   Problem Relation Age of Onset    Alzheimer Disease Mother     Breast Cancer  "Mother     Cancer Father         blood cancer    Prostate Cancer Father     Diabetes Paternal Grandmother        Reviewed past medical, surgical, and family history with patient found on new patient intake packet located in EMR Media tab.     SOCIAL HX: Reviewed    ROS: Specifically negative for bowel/bladder dysfunction, balance changes, headache, dizziness, foot drop, fevers, chills, appetite changes, nausea/vomiting, unexplained weight loss. Otherwise 13 systems reviewed are negative. Please see the patient's intake questionnaire from today for details.    OBJECTIVE:  /60 (BP Location: Left arm, Patient Position: Sitting, Cuff Size: Adult Regular)   Pulse 82   Ht 5' 8\" (1.727 m)   Wt 167 lb (75.8 kg)   SpO2 98%   BMI 25.39 kg/m      PHYSICAL EXAMINATION:  --CONSTITUTIONAL: Vital signs as above. No acute distress. The patient is well nourished and well groomed.  --PSYCHIATRIC: The patient is awake, alert, oriented to person, place, time and answering questions appropriately with clear speech. Appropriate mood and affect   --RESPIRATORY: Normal rhythm and effort. No abnormal accessory muscle breathing patterns noted.   --GROSS MOTOR: Easily arises from a seated position.  Walks with AFO and straight 4 point cane  --LUMBAR SPINE: Inspection reveals no evidence of deformity. Range of motion is not limited in flexion, extension, lateral rotation. Mild tenderness to palpation of lumbar paraspinals, no tenderness in spinous processes.  Facet loading (Grimes test) negative, seated SLR positive negative  --HIPS:   --LOWER EXTREMITY MOTOR TESTING:  Hip flexion left 5/5, right 5/5  Knee extension left 5/5, right 5/5  Ankle dorsiflexors left 5/5, right 5/5  Ankle plantarflexors left 5/5, right 1/5   Great toe extension left 5/5, right 1/5   Knee flexion left 5/5, right 5/5  --NEUROLOGIC: Sensation to lower extremities is intact bilaterally in L2-S1 dermatomes.    --VASCULAR: Warm upper limbs bilaterally. Capillary " refill in the upper extremities is less than 1 second.    RESULTS: Available medical records from [facility] and any other outside records were reviewed today.     Imaging:  Available relevant imaging was personally reviewed and interpreted today. The images were shown to the patient and the findings were explained using a spine model.    MR Lumbar Spine w/o Contrast    Result Date: 2/11/2024  EXAM: MR LUMBAR SPINE W/O CONTRAST LOCATION: [facility] DATE: 2/10/2024 INDICATION: abnormal EMG study showed multilevel lumbosacral radiculopathies, patient has right foot drop, bilateral neuropathy. History of prostate cancer. COMPARISON: None. TECHNIQUE: Routine Lumbar Spine MRI without IV contrast. FINDINGS: Prominent S1-S2 disc space.  Normal vertebral body heights. L1 vertebral body tiny nonspecific lesion may reflect atypical hemangioma, bone island, metastatic disease. L4 vertebral body, left posterior iliac bone small lesions may reflect metastatic disease or atypical hemangiomas. Left S1 segment tiny sclerotic lesion may reflect bone island or metastatic disease. PET/CT 01/18/2024 described a right iliac bone metastasis not visualized on current exam. Otherwise, no concerning bone marrow lesions.  Normal distal spinal cord and cauda equina with conus medullaris at L1.  Unremarkable visualized bony pelvis.  Bilateral paraspinal muscles overlying the lower lumbar spine demonstrate patchy increased STIR signal nonspecific possibly edema or inflammation. Mild multilevel degenerative disc disease. Multilevel facet arthropathy worse within the lower lumbar spine. Irregularity spinous processes Baastrup's disease. Interspinous bursitis. Bilateral SI degenerative joint disease. L1-L2, L2-L3, L3-L4, L4-L5 mild degenerative grade 1 retrolisthesis. T11-T12: Slight bulge. No significant thecal sac stenosis. No significant neural foraminal stenosis. T12-L1: No significant bulge or posterior disc  protrusion. No significant thecal sac stenosis. No significant neural foraminal stenosis.  L1-L2: No significant bulge or posterior disc protrusion. No significant thecal sac stenosis. No significant neural foraminal stenosis. L2-L3: Slight bulge. No significant thecal sac stenosis. No significant left foraminal stenosis. Moderate right foraminal stenosis. L3-L4: Mild bulge with bilateral foraminal extension. Facet hypertrophy. No significant thecal sac stenosis. Moderate to severe bilateral foraminal stenosis. L4-L5: Mild bulge. Facet hypertrophy. No significant thecal sac stenosis. Mild left foraminal stenosis. Mild to moderate right foraminal stenosis. L5-S1: Bulge. Facet hypertrophy. No significant thecal sac stenosis. Mild bilateral foraminal stenosis. EXTRASPINAL FINDINGS: No significant findings.      IMPRESSION: 1.  Nonspecific small bone marrow lesions described above. Please see above for discussion. 2.  Multilevel spondylosis described above. 3.  No significant thecal sac stenosis. 4.  L3-L4: Moderate to severe bilateral foraminal stenosis.

## 2024-02-26 NOTE — PATIENT INSTRUCTIONS
~Spine Center Scheduling #[phone].  ~Please call our [facility] Spine Nurse Navigation #[phone] with any questions or concerns about your treatment plan, if symptoms worsen and you would like to be seen urgently, or if you have problems controlling bladder and bowel function.  ~For any future flareups or new symptoms, recommend follow-up in clinic or contact the nurse navigator line.  ~Please note that any My Chart messages may take multiple days for a response due to the high volume of patients seen in clinic.  Anything sent Friday night or after will be answered the following week when able.

## 2024-02-26 NOTE — LETTER
2/26/2024         RE: Se Quinonez  [address] [facility] [id number]        Dear Colleague,    Thank you for referring your patient, Se Quinonez, to the [facility]. Please see a copy of my visit note below.    ASSESSMENT:  Se Quinonez is a 68 year old male presents for consultation at the request of Ashley Akhtar who presents today for new patient evaluation of :    Diagnoses and all orders for this visit:  Lumbosacral plexopathy  -     Adult Neurology  Referral; Future  Foraminal stenosis of lumbar region  -     Spine  Referral  Foot drop, right  -     Spine  Referral  -     Adult Neurology  Referral; Future       Patient is neurologically intact on exam. No myelopathic or red flag symptoms.    Patient presents with weakness and foot drop of the right lower extremity.  He denies any significant back or radicular pain.  We reviewed his EMG and MRI results, EMG shows findings suggestive of lumbosacral plexopathy rather than lumbar radiculopathy particularly with absent SNAP and intact paraspinal activity in the lumbar region, and while his MRI does show moderate to severe foraminal stenosis of the L3-4 level he does not have any radicular findings in the L3 or L4 nerve distributions.  We discussed that the weakness in his ankle dorsiflexors on the right side would suggest involvement of the L5, lumbosacral plexus, or peroneal nerve.  Since the lumbosacral plexus is the most likely given his EMG findings and history of pelvic radiation, I recommended continued follow-up with neurology to see what the treatment options are as spinal interventions would likely be of limited benefit.  Patient requested referral to neurology in [place] as this would be easier for him to drive.  I advised that while we can make that referral, if he requires a neuromuscular specialist that may still entail going down to the [facility]  campus.  Patient endorsed understanding, and we remain available to help with any further spine care as needed.    PLAN:  Reviewed spine anatomy and disease process. Discussed diagnosis and treatment options with the patient today. A shared decision making model was used. The patient's values and choices were respected. The following represents what was discussed and decided upon by the provider and the patient.    1. DIAGNOSTIC TESTS  No new imaging orders at this time.    2. PHYSICAL THERAPY  Patient is already in PT or has a pending referral to begin soon.  Discussed the importance of core strengthening, ROM, stretching exercises with the patient and how each of these entities is important in decreasing pain.  Explained to the patient that the purpose of physical therapy is to teach the patient a home exercise program.  These exercises need to be performed every day in order to decrease pain and prevent future occurrences of pain.  Likened it to brushing one's teeth.      3. MEDICATIONS:  Discussed multiple medication options today with patient. Discussed risks, side effects, and proper use of medications. Patient verbalized understanding.  No new medications ordered at this visit.    4. INTERVENTIONS:  Interventional treatments are not indicated for patient's presentation.  Advised patient that interventional treatments tend to be of limited benefit for weakness.  In addition, his symptoms suggest an extraspinal origin.  If back or radicular pain become a larger component of the presentation, can revisit interventional treatment options.    5. OTHER REFERRALS:  No other referrals at this time.    6. FOLLOW-UP  As needed.    Advised patient to call the [facility] if symptoms worsen or you have problems controlling bladder and bowel function.   ______________________________________________________________________    SUBJECTIVE:   Se Quinonez  is a 68 year old male who presents today for new patient  evaluation.    Patient presents with a primary concern of right-sided foot drop.  He reports a history of prostate cancer with radiation therapy, and in the summer 2023 he began noticing progressive weakness of the right foot and ankle.  He initially started with physical therapy in the summer and fall 2023 and was given an AFO brace for stability of the right ankle.  Since then he has seen neurology for workup of potential lumbosacral plexopathy given the history of pelvic radiation, and had MR imaging and EMG performed.  EMG suggested lumbosacral plexopathy versus multilevel lumbar radiculopathy, though plexopathy was favored given specific findings of absent sensory nerve action potentials and intact paraspinal activity.  He also had an MRI of the lumbar spine done which shows moderate to severe foraminal stenosis of L3-4, so was referred to spine health to see if there might be a spinal component to his presentation.    Patient reports after his EMG appointment he was recommended for nerve biopsy and possible steroid treatment, but he elected to defer at that time pending today's evaluation.    Patient denies any significant back or leg pain.  Denies any shooting down the legs to the foot, and no significant weakness or pain of the thighs or hip flexors.  No new bladder or bowel incontinence.    No prior back surgeries    -Treatment to Date: As above      Oswestry (ELOY) Questionnaire        2/21/2024     4:24 PM   OSWESTRY DISABILITY INDEX   Count 9   Sum 15   Oswestry Score (%) 33.33 %       Neck Disability Index:      2/21/2024     4:27 PM   Neck Disability Index (  Shivam H. and Brianna MONGE. 1991. All rights reserved.; used with permission)   SECTION 1 - PAIN INTENSITY 0   SECTION 2 - PERSONAL CARE 0   SECTION 3 - LIFTING 4   SECTION 4 - READING 0   SECTION 5 - HEADACHES 0   SECTION 6 - CONCENTRATION 1   SECTION 7 - WORK 2   SECTION 8 - DRIVING 0   SECTION 9 - SLEEPING 0   Count 9   Sum 7   Raw Score: /50 7.78    Neck Disability Index Score: (%) 15.56 %              -Medications:    Current Outpatient Medications   Medication     abiraterone (ZYTIGA) 250 MG tablet     acetaminophen (TYLENOL) 500 MG tablet     blood glucose (NO BRAND SPECIFIED) test strip     blood glucose monitoring (NO BRAND SPECIFIED) meter device kit     famotidine (PEPCID) 20 MG tablet     furosemide (LASIX) 20 MG tablet     hydrocortisone sodium succinate PF (SOLU-CORTEF) injection     ibuprofen (ADVIL/MOTRIN) 200 MG tablet     leuprolide (LUPRON DEPOT) 22.5 MG kit     metFORMIN (GLUCOPHAGE XR) 500 MG 24 hr tablet     multivitamin (CENTRUM SILVER) tablet     prednisoLONE 5 MG tablet     predniSONE (DELTASONE) 5 MG tablet     pregabalin (LYRICA) 25 MG capsule     tamsulosin (FLOMAX) 0.4 MG capsule     thin (NO BRAND SPECIFIED) lancets     UNABLE TO FIND     gabapentin (NEURONTIN) 300 MG capsule     olaparib (LYNPARZA) 150 MG tablet     STATIN NOT PRESCRIBED (INTENTIONAL)     No current facility-administered medications for this visit.       No Known Allergies    Past Medical History:   Diagnosis Date     Arthritis 04/05/2023    Rheumatoid factor confirmed     Cancer (H) 09/2923    Prostate     Crohn disease (H)      Diabetes (H) 08/21/2023     Herpes zoster     Created by Conversion  Replacement Utility updated for latest IMO load     Postherpetic polyneuropathy     Created by Conversion         Patient Active Problem List   Diagnosis     Crohn's disease with complication, unspecified gastrointestinal tract location (H)     Malignant tumor of prostate (H)     Ulceration of intestine     Raised prostate specific antigen     Hemorrhoids     Diverticular disease of large intestine     Constipation     Rheumatoid arthritis (H)     Malignant neoplasm metastatic to bone (H)     Malignant 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, unspecified lymph node region (H)     Bilateral leg weakness     Gait instability     Peripheral neuropathy due to chemotherapy  (H24)  "      Past Surgical History:   Procedure Laterality Date     COLONOSCOPY       ENT SURGERY  08/08/2023    partial right nasal polyp removal     EYE SURGERY       OTHER SURGICAL HISTORY      tonsillectomy       Family History   Problem Relation Age of Onset     Alzheimer Disease Mother      Breast Cancer Mother      Cancer Father         blood cancer     Prostate Cancer Father      Diabetes Paternal Grandmother        Reviewed past medical, surgical, and family history with patient found on new patient intake packet located in EMR Media tab.     SOCIAL HX: Reviewed    ROS: Specifically negative for bowel/bladder dysfunction, balance changes, headache, dizziness, foot drop, fevers, chills, appetite changes, nausea/vomiting, unexplained weight loss. Otherwise 13 systems reviewed are negative. Please see the patient's intake questionnaire from today for details.    OBJECTIVE:  /60 (BP Location: Left arm, Patient Position: Sitting, Cuff Size: Adult Regular)   Pulse 82   Ht 5' 8\" (1.727 m)   Wt 167 lb (75.8 kg)   SpO2 98%   BMI 25.39 kg/m      PHYSICAL EXAMINATION:  --CONSTITUTIONAL: Vital signs as above. No acute distress. The patient is well nourished and well groomed.  --PSYCHIATRIC: The patient is awake, alert, oriented to person, place, time and answering questions appropriately with clear speech. Appropriate mood and affect   --RESPIRATORY: Normal rhythm and effort. No abnormal accessory muscle breathing patterns noted.   --GROSS MOTOR: Easily arises from a seated position.  Walks with AFO and straight 4 point cane  --LUMBAR SPINE: Inspection reveals no evidence of deformity. Range of motion is not limited in flexion, extension, lateral rotation. Mild tenderness to palpation of lumbar paraspinals, no tenderness in spinous processes.  Facet loading (Grimes test) negative, seated SLR positive negative  --HIPS:   --LOWER EXTREMITY MOTOR TESTING:  Hip flexion left 5/5, right 5/5  Knee extension left 5/5, right " 5/5  Ankle dorsiflexors left 5/5, right 5/5  Ankle plantarflexors left 5/5, right 1/5   Great toe extension left 5/5, right 1/5   Knee flexion left 5/5, right 5/5  --NEUROLOGIC: Sensation to lower extremities is intact bilaterally in L2-S1 dermatomes.    --VASCULAR: Warm upper limbs bilaterally. Capillary refill in the upper extremities is less than 1 second.    RESULTS: Available medical records from [facility] and any other outside records were reviewed today.     Imaging:  Available relevant imaging was personally reviewed and interpreted today. The images were shown to the patient and the findings were explained using a spine model.    MR Lumbar Spine w/o Contrast    Result Date: 2/11/2024  EXAM: MR LUMBAR SPINE W/O CONTRAST LOCATION: [facility] DATE: 2/10/2024 INDICATION: abnormal EMG study showed multilevel lumbosacral radiculopathies, patient has right foot drop, bilateral neuropathy. History of prostate cancer. COMPARISON: None. TECHNIQUE: Routine Lumbar Spine MRI without IV contrast. FINDINGS: Prominent S1-S2 disc space.  Normal vertebral body heights. L1 vertebral body tiny nonspecific lesion may reflect atypical hemangioma, bone island, metastatic disease. L4 vertebral body, left posterior iliac bone small lesions may reflect metastatic disease or atypical hemangiomas. Left S1 segment tiny sclerotic lesion may reflect bone island or metastatic disease. PET/CT 01/18/2024 described a right iliac bone metastasis not visualized on current exam. Otherwise, no concerning bone marrow lesions.  Normal distal spinal cord and cauda equina with conus medullaris at L1.  Unremarkable visualized bony pelvis.  Bilateral paraspinal muscles overlying the lower lumbar spine demonstrate patchy increased STIR signal nonspecific possibly edema or inflammation. Mild multilevel degenerative disc disease. Multilevel facet arthropathy worse within the lower lumbar spine. Irregularity spinous  processes Baastrup's disease. Interspinous bursitis. Bilateral SI degenerative joint disease. L1-L2, L2-L3, L3-L4, L4-L5 mild degenerative grade 1 retrolisthesis. T11-T12: Slight bulge. No significant thecal sac stenosis. No significant neural foraminal stenosis. T12-L1: No significant bulge or posterior disc protrusion. No significant thecal sac stenosis. No significant neural foraminal stenosis.  L1-L2: No significant bulge or posterior disc protrusion. No significant thecal sac stenosis. No significant neural foraminal stenosis. L2-L3: Slight bulge. No significant thecal sac stenosis. No significant left foraminal stenosis. Moderate right foraminal stenosis. L3-L4: Mild bulge with bilateral foraminal extension. Facet hypertrophy. No significant thecal sac stenosis. Moderate to severe bilateral foraminal stenosis. L4-L5: Mild bulge. Facet hypertrophy. No significant thecal sac stenosis. Mild left foraminal stenosis. Mild to moderate right foraminal stenosis. L5-S1: Bulge. Facet hypertrophy. No significant thecal sac stenosis. Mild bilateral foraminal stenosis. EXTRASPINAL FINDINGS: No significant findings.      IMPRESSION: 1.  Nonspecific small bone marrow lesions described above. Please see above for discussion. 2.  Multilevel spondylosis described above. 3.  No significant thecal sac stenosis. 4.  L3-L4: Moderate to severe bilateral foraminal stenosis.          Again, thank you for allowing me to participate in the care of your patient.        Sincerely,        Marques Rod MD

## 2024-02-27 ENCOUNTER — TELEPHONE (OUTPATIENT)
Dept: NEUROLOGY | Facility: CLINIC | Age: 69
End: 2024-02-27
Payer: MEDICARE

## 2024-02-27 ENCOUNTER — MYC MEDICAL ADVICE (OUTPATIENT)
Dept: FAMILY MEDICINE | Facility: CLINIC | Age: 69
End: 2024-02-27
Payer: MEDICARE

## 2024-02-27 DIAGNOSIS — R29.898 LEG WEAKNESS, BILATERAL: ICD-10-CM

## 2024-02-27 DIAGNOSIS — M21.371 RIGHT FOOT DROP: Primary | ICD-10-CM

## 2024-02-27 NOTE — TELEPHONE ENCOUNTER
Health Call Center    Phone Message    May a detailed message be left on voicemail: yes     Reason for Call: Appointment Intake    Referring Provider Name: CLINT COTTOHIR  Diagnosis and/or Symptoms:   Foot drop, right  Lumbosacral plexopathy    Pt is requesting a call back to switch providers from Dr. Haas to a provider in [place] to be closer to his residence.     Please review and call pt to advise at # [phone]    Action Taken: Message routed to:  [facility] ([facility] Neurology     Travel Screening: Not Applicable

## 2024-02-28 NOTE — TELEPHONE ENCOUNTER
Yes, I can submit external referral. I often refer to [facility] if patients unable to get in with [facility] providers within specific time frame. I do not know any specific providers or neuromuscular specialists outside of [facility], but if he wants us to fax referral to specific clinic (maybe he found provider on his own) I can sign referral.     Ashley Akhtar, FRANKO CNP

## 2024-02-28 NOTE — TELEPHONE ENCOUNTER
Ashley  Pt sends provider name at [facility] they would like referral to.   It appears another provider placed a neuro referral also but to [place]    Magan Kowalski RN

## 2024-02-28 NOTE — TELEPHONE ENCOUNTER
Please fax referral to [facility]. I signed it. Thank you    FRANKO Kingston CNP

## 2024-03-01 ENCOUNTER — THERAPY VISIT (OUTPATIENT)
Dept: PHYSICAL THERAPY | Facility: CLINIC | Age: 69
End: 2024-03-01
Attending: NURSE PRACTITIONER
Payer: MEDICARE

## 2024-03-01 DIAGNOSIS — R29.898 BILATERAL LEG WEAKNESS: Primary | ICD-10-CM

## 2024-03-01 DIAGNOSIS — R26.81 GAIT INSTABILITY: ICD-10-CM

## 2024-03-01 PROCEDURE — 97112 NEUROMUSCULAR REEDUCATION: CPT | Mod: GP

## 2024-03-01 PROCEDURE — 97110 THERAPEUTIC EXERCISES: CPT | Mod: GP

## 2024-03-08 ENCOUNTER — THERAPY VISIT (OUTPATIENT)
Dept: PHYSICAL THERAPY | Facility: CLINIC | Age: 69
End: 2024-03-08
Attending: NURSE PRACTITIONER
Payer: MEDICARE

## 2024-03-08 ENCOUNTER — MYC REFILL (OUTPATIENT)
Dept: FAMILY MEDICINE | Facility: CLINIC | Age: 69
End: 2024-03-08
Payer: MEDICARE

## 2024-03-08 DIAGNOSIS — G62.9 PERIPHERAL POLYNEUROPATHY: ICD-10-CM

## 2024-03-08 DIAGNOSIS — R29.898 BILATERAL LEG WEAKNESS: Primary | ICD-10-CM

## 2024-03-08 DIAGNOSIS — R26.81 GAIT INSTABILITY: ICD-10-CM

## 2024-03-08 PROCEDURE — 97112 NEUROMUSCULAR REEDUCATION: CPT | Mod: GP

## 2024-03-08 PROCEDURE — 97110 THERAPEUTIC EXERCISES: CPT | Mod: GP

## 2024-03-08 RX ORDER — PREGABALIN 25 MG/1
25 CAPSULE ORAL 2 TIMES DAILY
Qty: 60 CAPSULE | Refills: 2 | Status: SHIPPED | OUTPATIENT
Start: 2024-03-08 | End: 2024-06-05

## 2024-03-15 ENCOUNTER — THERAPY VISIT (OUTPATIENT)
Dept: PHYSICAL THERAPY | Facility: CLINIC | Age: 69
End: 2024-03-15
Attending: NURSE PRACTITIONER
Payer: MEDICARE

## 2024-03-15 DIAGNOSIS — R29.898 BILATERAL LEG WEAKNESS: Primary | ICD-10-CM

## 2024-03-15 DIAGNOSIS — R26.81 GAIT INSTABILITY: ICD-10-CM

## 2024-03-15 PROCEDURE — 97112 NEUROMUSCULAR REEDUCATION: CPT | Mod: GP | Performed by: PHYSICAL THERAPIST

## 2024-03-15 PROCEDURE — 97110 THERAPEUTIC EXERCISES: CPT | Mod: GP | Performed by: PHYSICAL THERAPIST

## 2024-03-18 ENCOUNTER — TRANSFERRED RECORDS (OUTPATIENT)
Dept: HEALTH INFORMATION MANAGEMENT | Facility: CLINIC | Age: 69
End: 2024-03-18
Payer: MEDICARE

## 2024-03-29 ENCOUNTER — THERAPY VISIT (OUTPATIENT)
Dept: PHYSICAL THERAPY | Facility: CLINIC | Age: 69
End: 2024-03-29
Attending: NURSE PRACTITIONER
Payer: MEDICARE

## 2024-03-29 DIAGNOSIS — R29.898 BILATERAL LEG WEAKNESS: Primary | ICD-10-CM

## 2024-03-29 DIAGNOSIS — R26.81 GAIT INSTABILITY: ICD-10-CM

## 2024-03-29 PROCEDURE — 97110 THERAPEUTIC EXERCISES: CPT | Mod: GP | Performed by: PHYSICAL THERAPIST

## 2024-03-31 ENCOUNTER — HEALTH MAINTENANCE LETTER (OUTPATIENT)
Age: 69
End: 2024-03-31

## 2024-04-01 DIAGNOSIS — R60.0 EDEMA OF BOTH LEGS: ICD-10-CM

## 2024-04-01 RX ORDER — FUROSEMIDE 20 MG
TABLET ORAL
Qty: 30 TABLET | Refills: 0 | Status: SHIPPED | OUTPATIENT
Start: 2024-04-01 | End: 2024-08-29

## 2024-04-01 NOTE — TELEPHONE ENCOUNTER
GFR Estimate   Date Value Ref Range Status   12/06/2023 >90 >60 mL/min/1.73m2 Final   07/30/2018 >60 >60 mL/min/1.73m2 Final     GFR Estimate If Black   Date Value Ref Range Status   07/30/2018 >60 >60 mL/min/1.73m2 Final

## 2024-04-05 ENCOUNTER — THERAPY VISIT (OUTPATIENT)
Dept: PHYSICAL THERAPY | Facility: CLINIC | Age: 69
End: 2024-04-05
Attending: NURSE PRACTITIONER
Payer: MEDICARE

## 2024-04-05 DIAGNOSIS — R26.81 GAIT INSTABILITY: ICD-10-CM

## 2024-04-05 DIAGNOSIS — R29.898 BILATERAL LEG WEAKNESS: Primary | ICD-10-CM

## 2024-04-05 PROCEDURE — 97112 NEUROMUSCULAR REEDUCATION: CPT | Mod: GP | Performed by: PHYSICAL THERAPIST

## 2024-04-05 PROCEDURE — 97110 THERAPEUTIC EXERCISES: CPT | Mod: GP | Performed by: PHYSICAL THERAPIST

## 2024-04-10 ENCOUNTER — MYC MEDICAL ADVICE (OUTPATIENT)
Dept: FAMILY MEDICINE | Facility: CLINIC | Age: 69
End: 2024-04-10
Payer: MEDICARE

## 2024-04-11 ENCOUNTER — THERAPY VISIT (OUTPATIENT)
Dept: PHYSICAL THERAPY | Facility: CLINIC | Age: 69
End: 2024-04-11
Attending: NURSE PRACTITIONER
Payer: MEDICARE

## 2024-04-11 ENCOUNTER — LAB (OUTPATIENT)
Dept: LAB | Facility: CLINIC | Age: 69
End: 2024-04-11
Payer: MEDICARE

## 2024-04-11 DIAGNOSIS — E11.43 TYPE II DIABETES MELLITUS WITH PERIPHERAL AUTONOMIC NEUROPATHY (H): ICD-10-CM

## 2024-04-11 DIAGNOSIS — R26.81 GAIT INSTABILITY: ICD-10-CM

## 2024-04-11 DIAGNOSIS — R29.898 BILATERAL LEG WEAKNESS: Primary | ICD-10-CM

## 2024-04-11 LAB
ANION GAP SERPL CALCULATED.3IONS-SCNC: 12 MMOL/L (ref 7–15)
BUN SERPL-MCNC: 15.3 MG/DL (ref 8–23)
CALCIUM SERPL-MCNC: 9.2 MG/DL (ref 8.8–10.2)
CHLORIDE SERPL-SCNC: 105 MMOL/L (ref 98–107)
CREAT SERPL-MCNC: 0.85 MG/DL (ref 0.67–1.17)
DEPRECATED HCO3 PLAS-SCNC: 25 MMOL/L (ref 22–29)
EGFRCR SERPLBLD CKD-EPI 2021: >90 ML/MIN/1.73M2
GLUCOSE SERPL-MCNC: 111 MG/DL (ref 70–99)
HBA1C MFR BLD: 5.2 % (ref 0–5.6)
POTASSIUM SERPL-SCNC: 3.9 MMOL/L (ref 3.4–5.3)
SODIUM SERPL-SCNC: 142 MMOL/L (ref 135–145)

## 2024-04-11 PROCEDURE — 97110 THERAPEUTIC EXERCISES: CPT | Mod: GP | Performed by: PHYSICAL THERAPIST

## 2024-04-11 PROCEDURE — 97112 NEUROMUSCULAR REEDUCATION: CPT | Mod: GP | Performed by: PHYSICAL THERAPIST

## 2024-04-11 PROCEDURE — 83036 HEMOGLOBIN GLYCOSYLATED A1C: CPT

## 2024-04-11 PROCEDURE — 36415 COLL VENOUS BLD VENIPUNCTURE: CPT

## 2024-04-11 PROCEDURE — 80048 BASIC METABOLIC PNL TOTAL CA: CPT

## 2024-04-11 NOTE — TELEPHONE ENCOUNTER
Forwarding MyChart to PCP as FYI.   Unsure if patient needs anything at this time.  MyChart sent, and patient advised to schedule follow-up with you if needing further review/consultation, etc.    Noa Riddle RN  [facility]

## 2024-04-11 NOTE — PROGRESS NOTES
PHYSICAL THERAPY PROGRESS NOTE     04/11/24 0500   Appointment Info   Signing clinician's name / credentials Kris Hoenk,PT   Total/Authorized Visits    Visits Used 14 visits   Medical Diagnosis gait instability, B LE weakness, neuropathy   Quick Adds Certification   Progress Note/Certification   Start of Care Date 01/02/24   Onset of illness/injury or Date of Surgery 05/01/23  (approx date)   Therapy Frequency 1x/wk   Predicted Duration 8wks   Certification date from 02/27/24   Certification date to 04/23/24   GOALS   PT Goals 2;3;4;5   PT Goal 1   Goal Identifier 1 still valid, extend date to 4/9   Goal Description will obtain AFO  in order to ambulate indep household distance w/o asst device in 6wk   Goal Progress has AFO, using cane, can go w/o device, not comfortable for LB   Target Date 02/13/24   Date Met 03/29/24   PT Goal 2   Goal Identifier 2still valid, extend date to 4/9   Goal Description pt will be able to do reciprocal stairs up, (not down) in 8wk   Goal Progress mostly one at a time, can do both,  has to pull on arms a lot   Target Date 02/27/24   Date Met 04/11/24   PT Goal 3   Goal Identifier 3 still valid, extend date to 4/9   Goal Description pt will be able to walk community distances w/ least restrictive device in 8wk for shopping, social outings   Goal Progress must have cart in stores, tires, does use cane   Target Date 02/27/24   PT Goal 4   Goal Identifier 4 new   Goal Description will be kwaku to  walk in his home w/o assistve device in 8wk   Goal Progress can go some w/o device, increases LB, waddles   Target Date 04/23/24   PT Goal 5   Goal Identifier 5   Goal Description pt will be kwaku to walk with cane on uneven ground - grass, parking lot in 8wk   Goal Progress walked in grass at visit today, sideways on hil most difficult   Target Date 04/23/24   Date Met 04/11/24   Subjective Report   Subjective Report did not go to pool at all this week, does ex faithfully, wished leg was stronger,  "new MRI did not show anything different than prior one. This nuerologist seemed to think it is from the chemo he had.   Objective Measures   Objective Measures Objective Measure 1;Objective Measure 2   Objective Measure 1   Objective Measure SL hip abd can initiate R but kicks more forward, Supine hip flexion w/ some ER in hip, L hip abd 2+-3/5 at best,also very difficult   Details able to walk w/o device short distances, gets more lateral trunk motion which hurts the low back   Treatment Interventions (PT)   Interventions Therapeutic Procedure/Exercise;Gait Training;Neuromuscular Re-education   Therapeutic Procedure/Exercise   Therapeutic Procedures: strength, endurance, ROM, flexibility minutes (45078) 24   Ther Proc 1 - Details prone hip ext review x5B, standing hip abd hands on wall instead of counnter - made it very difficult dirk to kick L, pt now sees how much he leans on counter for assist, standing on R is weak   Skilled Intervention strength ex to continue HEP, improve walking, decrease falls   Patient Response/Progress Bilateral hip weakness R>L   Neuromuscular Re-education   Neuromuscular re-ed of mvmt, balance, coord, kinesthetic sense, posture, proprioception minutes (25248) 30   Neuro Re-ed 1 - Details walked outdoors on grass, up/down hill, sideways across hill R and L, pracitce 6\" step up with cane and no rail like a curb in public places, able to do up and down w/o LOB, retro walking w/cane 20ft x2, side step x10 R/L no assist   Skilled Intervention balance and NMR for weak  mm, improve walking, balance   Patient Response/Progress rests as needed   Education   Learner/Method Patient;Demonstration;No Barriers to Learning   Plan   Home program ex listed   Plan for next session cont to work strength, balance, gait 1x/wk, couple visit just to finalize HEP, about same status for R LE strength   Total Session Time   Timed Code Treatment Minutes 54   Total Treatment Time (sum of timed and untimed services) " 54     [facility]  Kris Hoenk  PT  [facility]  [address] [place] [address]  [email or web address]   Office: [phone]  Voicemail: [phone]

## 2024-04-15 ENCOUNTER — OFFICE VISIT (OUTPATIENT)
Dept: FAMILY MEDICINE | Facility: CLINIC | Age: 69
End: 2024-04-15
Attending: NURSE PRACTITIONER
Payer: MEDICARE

## 2024-04-15 VITALS
HEART RATE: 83 BPM | DIASTOLIC BLOOD PRESSURE: 68 MMHG | TEMPERATURE: 97.8 F | SYSTOLIC BLOOD PRESSURE: 112 MMHG | HEIGHT: 67 IN | OXYGEN SATURATION: 99 % | RESPIRATION RATE: 16 BRPM | BODY MASS INDEX: 26.68 KG/M2 | WEIGHT: 170 LBS

## 2024-04-15 DIAGNOSIS — G62.0 PERIPHERAL NEUROPATHY DUE TO CHEMOTHERAPY (H): ICD-10-CM

## 2024-04-15 DIAGNOSIS — T45.1X5A PERIPHERAL NEUROPATHY DUE TO CHEMOTHERAPY (H): ICD-10-CM

## 2024-04-15 DIAGNOSIS — E11.43 TYPE II DIABETES MELLITUS WITH PERIPHERAL AUTONOMIC NEUROPATHY (H): ICD-10-CM

## 2024-04-15 PROCEDURE — 99214 OFFICE O/P EST MOD 30 MIN: CPT | Performed by: NURSE PRACTITIONER

## 2024-04-15 RX ORDER — FAMOTIDINE 10 MG
20 TABLET ORAL
COMMUNITY
Start: 2023-06-20 | End: 2024-04-15

## 2024-04-15 ASSESSMENT — ENCOUNTER SYMPTOMS: NEUROLOGIC COMPLAINT: 1

## 2024-04-15 ASSESSMENT — PAIN SCALES - GENERAL: PAINLEVEL: NO PAIN (0)

## 2024-04-15 NOTE — PROGRESS NOTES
Assessment & Plan     Type II diabetes mellitus with peripheral autonomic neuropathy (H)  -well controlled  -continue Metformin as prescribed and follow diabetic diet   - PRIMARY CARE FOLLOW-UP SCHEDULING    Peripheral neuropathy due to chemotherapy (H24)   -patient has progressive asymmetric (R>L) leg weakness and numbness that started around the time he was receiving chemotherapy but significantly progressed in the past few months, since last summer.   -he is following with neurology, continue to use AFO brace on the right side, per neurology recommendations he is scheduled for LP for additional evaluation and than will have close follow up with his neurologist again   - PRIMARY CARE FOLLOW-UP SCHEDULING        Abi Saez is a 69 year old, presenting for the following health issues:  Diabetes, Neurologic Problem, and Edema        4/15/2024     9:35 AM   Additional Questions   Roomed by rmb   Accompanied by spouse         4/15/2024     9:35 AM   Patient Reported Additional Medications   Patient reports taking the following new medications none     Neurologic Problem    History of Present Illness       Diabetes:   He presents for follow up of diabetes.  He is checking home blood glucose one time daily.   He checks blood glucose before meals.  Blood glucose is never over 200 and never under 70.    He is concerned about other.   He is having numbness in feet and weight loss.  The patient has not had a diabetic eye exam in the last 12 months.          Reason for visit:  Edema, Leg Weakness, Neuropathy and DiabetesHe consumes 1 sweetened beverage(s) daily.He exercises with enough effort to increase his heart rate 30 to 60 minutes per day.  He exercises with enough effort to increase his heart rate 7 days per week.   He is taking medications regularly.           Review of Systems  Constitutional, HEENT, cardiovascular, pulmonary, gi and gu systems are negative, except as otherwise noted.      Objective    BP  "112/68   Pulse 83   Temp 97.8  F (36.6  C) (Tympanic)   Resp 16   Ht 1.702 m (5' 7\")   Wt 77.1 kg (170 lb)   SpO2 99%   BMI 26.63 kg/m    Body mass index is 26.63 kg/m .  Physical Exam   GENERAL: alert and no distress  EYES: Eyes grossly normal to inspection, PERRL and conjunctivae and sclerae normal  RESP: lungs clear to auscultation - no rales, rhonchi or wheezes  CV: regular rates and rhythm, normal S1 S2, no S3 or S4, no murmur, click or rub, peripheral pulses strong, and trace bilateral lower extremity pitting edema   MS: no gross musculoskeletal defects noted, no edema  NEURO: Normal strength and tone, mentation intact and speech normal  PSYCH: mentation appears normal, affect normal/bright    Lab Results   Component Value Date    A1C 5.2 04/11/2024    A1C 5.1 11/27/2023    A1C 6.6 08/21/2023    A1C 5.3 07/08/2022    A1C 5.8 07/30/2018            Signed Electronically by: FRANKO Kingston CNP    "

## 2024-04-18 ENCOUNTER — TRANSFERRED RECORDS (OUTPATIENT)
Dept: HEALTH INFORMATION MANAGEMENT | Facility: CLINIC | Age: 69
End: 2024-04-18
Payer: MEDICARE

## 2024-04-19 ENCOUNTER — THERAPY VISIT (OUTPATIENT)
Dept: PHYSICAL THERAPY | Facility: CLINIC | Age: 69
End: 2024-04-19
Attending: NURSE PRACTITIONER
Payer: MEDICARE

## 2024-04-19 DIAGNOSIS — R26.81 GAIT INSTABILITY: ICD-10-CM

## 2024-04-19 DIAGNOSIS — R29.898 BILATERAL LEG WEAKNESS: Primary | ICD-10-CM

## 2024-04-19 PROCEDURE — 97110 THERAPEUTIC EXERCISES: CPT | Mod: GP | Performed by: PHYSICAL THERAPIST

## 2024-04-19 PROCEDURE — 97112 NEUROMUSCULAR REEDUCATION: CPT | Mod: GP | Performed by: PHYSICAL THERAPIST

## 2024-04-19 NOTE — PROGRESS NOTES
[facility]                                                                                   OUTPATIENT PHYSICAL THERAPY    PLAN OF TREATMENT FOR OUTPATIENT REHABILITATION   Patient's Last Name, First Name, Se Monterroso YOB: 1955   Provider's Name   [facility]   Medical Record No.  [id number]     Onset Date: 05/01/23 (approx date)  Start of Care Date: 01/02/24     Medical Diagnosis:  gait instability, B LE weakness, neuropathy      PT Treatment Diagnosis:   weakness, impaired gait, balance Plan of Treatment  Frequency/Duration: 1x/wk/ (P) 6wks    Certification date from (P) 04/23/24 to (P) 06/04/24         See note for plan of treatment details and functional goals     Kris Hoenk, PT                         I CERTIFY THE NEED FOR THESE SERVICES FURNISHED UNDER        THIS PLAN OF TREATMENT AND WHILE UNDER MY CARE     (Physician attestation of this document indicates review and certification of the therapy plan).              Referring Provider:  Ashley Akhtar    Initial Assessment  See Epic Evaluation- Start of Care Date: 01/02/24 04/19/24 0500   Appointment Info   Signing clinician's name / credentials Kris Hoenk,PABLITO   Visits Used 15 note done at visit 8   Medical Diagnosis gait instability, B LE weakness, neuropathy   Quick Adds Certification   Progress Note/Certification   Start of Care Date 01/02/24   Onset of illness/injury or Date of Surgery 05/01/23  (approx date)   Therapy Frequency 1x/wk   Predicted Duration 6wks   Certification date from 04/23/24   Certification date to 06/04/24   GOALS   PT Goals 2;3;4;5;6   PT Goal 1   Goal Identifier 1 still valid, extend date to 4/9   Goal Description will obtain AFO  in order to ambulate indep household distance w/o asst device in 6wk   Goal Progress has AFO, using cane, can go w/o device, not comfortable for LB   Target Date 02/13/24   Date Met 03/29/24   PT  Goal 2   Goal Identifier 2still valid, extend date to 4/9   Goal Description pt will be able to do reciprocal stairs up, (not down) in 8wk   Goal Progress mostly one at a time, can do both,  has to pull on arms a lot   Target Date 02/27/24   Date Met 04/11/24   PT Goal 3   Goal Identifier 3 still valid, extend date to 4/9   Goal Description pt will be able to walk community distances w/ least restrictive device in 8wk for shopping, social outings   Goal Progress must have cart in stores, tires, does use cane   Target Date 02/27/24   PT Goal 4   Goal Identifier 4 still valid extend to 6/4   Goal Description will be kwaku to  walk in his home w/o assistve device in 8wk   Goal Progress uses cane mostly, can go some w/o device, increases LBP if he does too much, waddles   Target Date 04/23/24   PT Goal 5   Goal Identifier 5   Goal Description pt will be kwaku to walk with cane on uneven ground - grass, parking lot in 8wk   Goal Progress walked in grass at visit today, sideways on hil most difficult   Target Date 04/23/24   Date Met 04/11/24   PT Goal 6   Goal Identifier 6 new 4/19   Goal Description pt will be able to walk with AFO without cane for community ambulation 1/4 mile/shopping in 6wk   Target Date 05/31/24   Subjective Report   Subjective Report did get tour of gym, will go back next week and see how to use machines, switched neurologist , will do lumbar puncture on 4/30, had new scan, show little more activity at R ilium, has not had follow up with oncologist, foot does come up a little but now, not as droopy walking   Objective Measures   Objective Measures Objective Measure 1;Objective Measure 2   Objective Measure 1   Objective Measure DF 1+, can feel ant tib firing, small amount of active motion supine approx 10-15* range from PF position   Details SL hip abd can initiate R but kicks more forward, Supine hip flexion w/ some ER in hip, L hip abd 2+-3/5 at best,also very difficult   Treatment Interventions  "(PT)   Interventions Therapeutic Procedure/Exercise;Gait Training;Neuromuscular Re-education   Therapeutic Procedure/Exercise   Therapeutic Procedures: strength, endurance, ROM, flexibility minutes (46382) 30   Ther Proc 1 - Details w/o AFO : supine active DF, standing gastroc stretch, B heel raises, 6\" step up R using just cane x10, step up L not holding on x10, review hip flexor stretch, SLR x10, core leg thrust motion   Skilled Intervention strength ex to continue HEP, improve walking, decrease falls   Patient Response/Progress Bilateral hip weakness R>L   Neuromuscular Re-education   Neuromuscular re-ed of mvmt, balance, coord, kinesthetic sense, posture, proprioception minutes (88799) 15   Neuro Re-ed 1 - Details AFO on for these tasks: toe taps to 6\" step CGA floor R stance, walking 400ft w/o device, wearing AFO cues for hip stability/less trunk lean, SLS R with finger tip assist - lots of pressure on finger tip, marching moving forward SBA 25ft   Skilled Intervention balance and NMR for weak  mm, improve walking, balance   Patient Response/Progress rests as needed   Education   Learner/Method Patient;Demonstration;No Barriers to Learning   Plan   Home program ex listed   Plan for next session cont to work strength, balance, gait 1x/wk, couple visit just to finalize HEP, about same status for R LE strength   Total Session Time   Timed Code Treatment Minutes 45   Total Treatment Time (sum of timed and untimed services) 45     [facility]  Kris Hoenk  PT  [facility]  [address] [place] [address]  [email or web address]   Office: [phone]  Voicemail: [phone]    "

## 2024-04-25 ENCOUNTER — THERAPY VISIT (OUTPATIENT)
Dept: PHYSICAL THERAPY | Facility: CLINIC | Age: 69
End: 2024-04-25
Attending: NURSE PRACTITIONER
Payer: MEDICARE

## 2024-04-25 DIAGNOSIS — R29.898 BILATERAL LEG WEAKNESS: Primary | ICD-10-CM

## 2024-04-25 DIAGNOSIS — R26.81 GAIT INSTABILITY: ICD-10-CM

## 2024-04-25 PROCEDURE — 97110 THERAPEUTIC EXERCISES: CPT | Mod: GP | Performed by: PHYSICAL THERAPIST

## 2024-04-25 PROCEDURE — 97112 NEUROMUSCULAR REEDUCATION: CPT | Mod: GP | Performed by: PHYSICAL THERAPIST

## 2024-05-09 ENCOUNTER — TRANSFERRED RECORDS (OUTPATIENT)
Dept: HEALTH INFORMATION MANAGEMENT | Facility: CLINIC | Age: 69
End: 2024-05-09
Payer: MEDICARE

## 2024-05-10 ENCOUNTER — THERAPY VISIT (OUTPATIENT)
Dept: PHYSICAL THERAPY | Facility: CLINIC | Age: 69
End: 2024-05-10
Attending: NURSE PRACTITIONER
Payer: MEDICARE

## 2024-05-10 DIAGNOSIS — R26.81 GAIT INSTABILITY: ICD-10-CM

## 2024-05-10 DIAGNOSIS — R29.898 BILATERAL LEG WEAKNESS: Primary | ICD-10-CM

## 2024-05-10 PROCEDURE — 97110 THERAPEUTIC EXERCISES: CPT | Mod: GP | Performed by: PHYSICAL THERAPIST

## 2024-05-10 PROCEDURE — 97112 NEUROMUSCULAR REEDUCATION: CPT | Mod: GP | Performed by: PHYSICAL THERAPIST

## 2024-05-13 ENCOUNTER — TRANSFERRED RECORDS (OUTPATIENT)
Dept: HEALTH INFORMATION MANAGEMENT | Facility: CLINIC | Age: 69
End: 2024-05-13
Payer: MEDICARE

## 2024-05-23 ENCOUNTER — THERAPY VISIT (OUTPATIENT)
Dept: PHYSICAL THERAPY | Facility: CLINIC | Age: 69
End: 2024-05-23
Attending: NURSE PRACTITIONER
Payer: MEDICARE

## 2024-05-23 DIAGNOSIS — R26.81 GAIT INSTABILITY: ICD-10-CM

## 2024-05-23 DIAGNOSIS — R29.898 BILATERAL LEG WEAKNESS: Primary | ICD-10-CM

## 2024-05-23 PROCEDURE — 97110 THERAPEUTIC EXERCISES: CPT | Mod: GP | Performed by: PHYSICAL THERAPIST

## 2024-05-23 PROCEDURE — 97116 GAIT TRAINING THERAPY: CPT | Mod: GP | Performed by: PHYSICAL THERAPIST

## 2024-05-23 PROCEDURE — 97112 NEUROMUSCULAR REEDUCATION: CPT | Mod: GP | Performed by: PHYSICAL THERAPIST

## 2024-05-24 DIAGNOSIS — E09.9 STEROID-INDUCED DIABETES MELLITUS, INITIAL ENCOUNTER (H): ICD-10-CM

## 2024-05-24 DIAGNOSIS — T38.0X5A STEROID-INDUCED DIABETES MELLITUS, INITIAL ENCOUNTER (H): ICD-10-CM

## 2024-05-28 ENCOUNTER — TRANSFERRED RECORDS (OUTPATIENT)
Dept: HEALTH INFORMATION MANAGEMENT | Facility: CLINIC | Age: 69
End: 2024-05-28
Payer: MEDICARE

## 2024-05-28 RX ORDER — METFORMIN HCL 500 MG
500 TABLET, EXTENDED RELEASE 24 HR ORAL
Qty: 90 TABLET | Refills: 1 | Status: SHIPPED | OUTPATIENT
Start: 2024-05-28 | End: 2024-09-04

## 2024-05-28 NOTE — TELEPHONE ENCOUNTER
Requested Prescriptions   Pending Prescriptions Disp Refills    metFORMIN (GLUCOPHAGE XR) 500 MG 24 hr tablet [Pharmacy Med Name: METFORMIN ER 500MG 24HR TABS] 90 tablet 1     Sig: TAKE 1 TABLET(500 MG) BY MOUTH DAILY WITH DINNER       Biguanide Agents Failed - 5/24/2024  5:11 PM        Failed - Medication indicated for associated diagnosis     Medication is associated with one or more of the following diagnoses:     Gestational diabetes mellitus     Hyperinsulinar obesity     Hypersecretion of ovarian androgens    Non-alcoholic fatty liver    Polycystic ovarian syndrome               Pre-diabetes (DM 2 prevention)    Type 2 diabetes mellitus     Weight gain, antipsychotic therapy-induced             Passed - Patient is age 10 or older        Passed - Patient does NOT have a diagnosis of CHF.        Passed - Medication is active on med list        Passed - Has GFR on file in past 12 months and most recent value is normal        Passed - Recent (6 mo) or future (90 days) visit within the authorizing provider's specialty     The patient must have completed an in-person or virtual visit within the past 6 months or has a future visit scheduled within the next 90 days with the authorizing provider s specialty.  Urgent care and e-visits do not quality as an office visit for this protocol.

## 2024-06-05 DIAGNOSIS — G62.9 PERIPHERAL POLYNEUROPATHY: ICD-10-CM

## 2024-06-05 RX ORDER — PREGABALIN 25 MG/1
CAPSULE ORAL
Qty: 60 CAPSULE | Refills: 2 | Status: SHIPPED | OUTPATIENT
Start: 2024-06-05 | End: 2024-09-09

## 2024-06-06 ENCOUNTER — THERAPY VISIT (OUTPATIENT)
Dept: PHYSICAL THERAPY | Facility: CLINIC | Age: 69
End: 2024-06-06
Attending: NURSE PRACTITIONER
Payer: MEDICARE

## 2024-06-06 DIAGNOSIS — R29.898 BILATERAL LEG WEAKNESS: Primary | ICD-10-CM

## 2024-06-06 DIAGNOSIS — R26.81 GAIT INSTABILITY: ICD-10-CM

## 2024-06-06 PROCEDURE — 97164 PT RE-EVAL EST PLAN CARE: CPT | Mod: GP,KX | Performed by: PHYSICAL THERAPIST

## 2024-06-06 PROCEDURE — 97110 THERAPEUTIC EXERCISES: CPT | Mod: GP,KX | Performed by: PHYSICAL THERAPIST

## 2024-06-06 PROCEDURE — 97112 NEUROMUSCULAR REEDUCATION: CPT | Mod: GP,KX | Performed by: PHYSICAL THERAPIST

## 2024-06-06 NOTE — PROGRESS NOTES
[facility]                                                                                   OUTPATIENT PHYSICAL THERAPY    PLAN OF TREATMENT FOR OUTPATIENT REHABILITATION   Patient's Last Name, First Name, Se Monterroso YOB: 1955   Provider's Name   [facility]   Medical Record No.  [id number]     Onset Date: 05/01/23 (approx date)  Start of Care Date: 01/02/24     Medical Diagnosis:  gait instability, B LE weakness, neuropathy      PT Treatment Diagnosis:   weakness, impaired gait Plan of Treatment  Frequency/Duration: 1x every other week/ 8wks    Certification date from 06/04/24 to 07/30/24         See note for plan of treatment details and functional goals     Kris Hoenk, PT                         I CERTIFY THE NEED FOR THESE SERVICES FURNISHED UNDER        THIS PLAN OF TREATMENT AND WHILE UNDER MY CARE     (Physician attestation of this document indicates review and certification of the therapy plan).              Referring Provider:  Ashley Akhtar    Initial Assessment  See Epic Evaluation- Start of Care Date: 01/02/24 06/06/24 0500   Appointment Info   Signing clinician's name / credentials Kris Hoenk,PT   Total/Authorized Visits KX needed   Visits Used 19 note done at visit 15   Medical Diagnosis gait instability, B LE weakness, neuropathy   Quick Adds Certification   Progress Note/Certification   Start of Care Date 01/02/24   Onset of illness/injury or Date of Surgery 05/01/23  (approx date)   Therapy Frequency 1x every other week   Predicted Duration 8wks   Certification date from 06/04/24   Certification date to 07/30/24   KX Modifier Statement Therapy services meets medical necessity requirements beyond the therapy threshold for ongoing care.   Progress Note Completed Date 06/06/24   GOALS   PT Goals 2;3;4;5;6;7   PT Goal 1   Goal Identifier 1 still valid, extend date to 4/9   Goal Description  will obtain AFO  in order to ambulate indep household distance w/o asst device in 6wk   Goal Progress has AFO, using cane, can go w/o device, not comfortable for LB   Target Date 02/13/24   Date Met 03/29/24   PT Goal 2   Goal Identifier 2still valid, extend date to 4/9   Goal Description pt will be able to do reciprocal stairs up, (not down) in 8wk   Goal Progress mostly one at a time, can do both,  has to pull on arms a lot   Target Date 02/27/24   Date Met 04/11/24   PT Goal 3   Goal Identifier 3 still valid, extend date to 6/4   Goal Description pt will be able to walk community distances w/ least restrictive device in 8wk for shopping, social outings   Goal Progress walked outdoors at art fairs   Target Date 02/27/24   Date Met 06/06/24   PT Goal 4   Goal Identifier 4 still valid extend to 6/4   Goal Description will be kwaku to  walk in his home w/o assistve device in 8wk   Goal Progress able to do this   Target Date 04/23/24   Date Met 05/10/24   PT Goal 5   Goal Identifier 5   Goal Description pt will be kwaku to walk with cane on uneven ground - grass, parking lot in 8wk   Goal Progress walked in grass at visit today, sideways on hil most difficult   Target Date 04/23/24   Date Met 04/11/24   PT Goal 6   Goal Identifier 6 new 4/19   Goal Description pt will be able to walk with AFO without cane for community ambulation 1/4 mile/shopping in 6wk   Goal Progress uses cane outdoors, does inside his house   Target Date 05/31/24   PT Goal 7   Goal Identifier 7 new   Goal Description will be able to walk in home without AFO up to 2-3 hours in 8wk   Target Date 07/30/24   Subjective Report   Subjective Report took a tumble, went to 2 art fairs last weekend, second one tripped on street sign leg support, shoes fell off too , walking around 4-5 hours, does walk in home without AFO, mostly with AFO on   Objective Measures   Objective Measures Objective Measure 1;Objective Measure 2   Objective Measure 1   Objective  Measure MMT hip flex R4, L 4+, hip abd R 2, L 2+, glut med/clam L 3-, R 2+, hip ext R2, L 3 - very limited hip extension ROM R worse than L, ankle DF R1/5, can see ant tib tendon activation cannot feel ant tib muscle firing, R inversion 2-2+/5, EV 0/5, PF 2+-3/5 can do B heel raises   Details SL hip abd can initiate R but kicks more forward   Treatment Interventions (PT)   Interventions Therapeutic Procedure/Exercise;Gait Training;Neuromuscular Re-education   Therapeutic Procedure/Exercise   Therapeutic Procedures: strength, endurance, ROM, flexibility minutes (00489) 15   Ther Proc 1 - Details SL clam x10B, SL hip abd x10 min A B, prone hip ext x5B - limited hip  ext ROM   Skilled Intervention strength ex to continue HEP, improve walking, decrease falls   Patient Response/Progress improving strength, hip abd weaker than extension   Neuromuscular Re-education   Neuromuscular re-ed of mvmt, balance, coord, kinesthetic sense, posture, proprioception minutes (21175) 15   Neuro Re-ed 1 - Details NOT wearing AFO: SLS several trials B, walking 100ft to clear toes - still march quality but not as bad as prior, B heel raise not holding on 3 sec hold x5   Skilled Intervention balance and NMR for weak  mm, improve walking, balance   Patient Response/Progress SLS approx 3-4 seconds R   Eval/Assessments   Assessments PT Re-Eval   PT Eval, Re-eval Minutes (18465) 15   Education   Learner/Method Patient;Demonstration;No Barriers to Learning   Plan   Home program ex listed   Plan for next session strength has improved over past month, more stable gait, continue working to not needing assistive device or brace   Total Session Time   Timed Code Treatment Minutes 30   Total Treatment Time (sum of timed and untimed services) 45   [facility]  Kris Hoenk  PT  [facility]  [address] [place] [address]  [email or web address]   Office: [phone]  Voicemail: [phone]

## 2024-06-12 ENCOUNTER — OFFICE VISIT (OUTPATIENT)
Dept: FAMILY MEDICINE | Facility: CLINIC | Age: 69
End: 2024-06-12
Payer: MEDICARE

## 2024-06-12 ENCOUNTER — MYC MEDICAL ADVICE (OUTPATIENT)
Dept: FAMILY MEDICINE | Facility: CLINIC | Age: 69
End: 2024-06-12

## 2024-06-12 VITALS
TEMPERATURE: 97.3 F | DIASTOLIC BLOOD PRESSURE: 70 MMHG | WEIGHT: 171.9 LBS | HEART RATE: 78 BPM | SYSTOLIC BLOOD PRESSURE: 120 MMHG | OXYGEN SATURATION: 98 % | BODY MASS INDEX: 26.92 KG/M2 | RESPIRATION RATE: 16 BRPM

## 2024-06-12 DIAGNOSIS — L30.9 ECZEMA, UNSPECIFIED TYPE: ICD-10-CM

## 2024-06-12 DIAGNOSIS — T38.0X5A STEROID-INDUCED DIABETES MELLITUS, INITIAL ENCOUNTER (H): Primary | ICD-10-CM

## 2024-06-12 DIAGNOSIS — R35.0 FREQUENT URINATION: ICD-10-CM

## 2024-06-12 DIAGNOSIS — E09.9 STEROID-INDUCED DIABETES MELLITUS, INITIAL ENCOUNTER (H): Primary | ICD-10-CM

## 2024-06-12 DIAGNOSIS — Z85.46 HISTORY OF PROSTATE CANCER: ICD-10-CM

## 2024-06-12 LAB
ALBUMIN UR-MCNC: NEGATIVE MG/DL
APPEARANCE UR: CLEAR
BILIRUB UR QL STRIP: NEGATIVE
COLOR UR AUTO: YELLOW
GLUCOSE UR STRIP-MCNC: NEGATIVE MG/DL
HGB UR QL STRIP: NEGATIVE
KETONES UR STRIP-MCNC: NEGATIVE MG/DL
LEUKOCYTE ESTERASE UR QL STRIP: NEGATIVE
NITRATE UR QL: NEGATIVE
PH UR STRIP: 6 [PH] (ref 5–7)
RBC #/AREA URNS AUTO: ABNORMAL /HPF
SP GR UR STRIP: 1.02 (ref 1–1.03)
SQUAMOUS #/AREA URNS AUTO: ABNORMAL /LPF
UROBILINOGEN UR STRIP-ACNC: 0.2 E.U./DL
WBC #/AREA URNS AUTO: ABNORMAL /HPF

## 2024-06-12 PROCEDURE — 81001 URINALYSIS AUTO W/SCOPE: CPT | Performed by: NURSE PRACTITIONER

## 2024-06-12 PROCEDURE — 87086 URINE CULTURE/COLONY COUNT: CPT | Performed by: NURSE PRACTITIONER

## 2024-06-12 PROCEDURE — 99214 OFFICE O/P EST MOD 30 MIN: CPT | Performed by: NURSE PRACTITIONER

## 2024-06-12 RX ORDER — TRIAMCINOLONE ACETONIDE 1 MG/G
CREAM TOPICAL 2 TIMES DAILY
Qty: 30 G | Refills: 0 | Status: SHIPPED | OUTPATIENT
Start: 2024-06-12 | End: 2024-08-29

## 2024-06-12 ASSESSMENT — PAIN SCALES - GENERAL: PAINLEVEL: NO PAIN (0)

## 2024-06-12 NOTE — PROGRESS NOTES
Assessment & Plan     Steroid-induced diabetes mellitus, initial encounter (H24)  -well controlled, continue Metformin and follow diabetic diet    Frequent urination  -UA normal, no improvement with Flomax, recommended urology consult   - UA with Microscopic reflex to Culture - lab collect; Future  - Urine Culture Aerobic Bacterial - lab collect; Future  - Adult Urology  Referral; Future  - UA with Microscopic reflex to Culture - lab collect  - Urine Culture Aerobic Bacterial - lab collect  - UA Microscopic with Reflex to Culture    History of prostate cancer    - Adult Urology  Referral; Future    Eczema, unspecified type    - triamcinolone (KENALOG) 0.1 % external cream; Apply topically 2 times daily        Abi Saez is a 69 year old, presenting for the following health issues:  Derm Problem and Urinary Problem (Urinary frequency- has been up every hour in a half at night )        6/12/2024     6:53 AM   Additional Questions   Roomed by chiquita   Accompanied by self         6/12/2024     6:53 AM   Patient Reported Additional Medications   Patient reports taking the following new medications none     History of Present Illness       Reason for visit:  1) Peeling skin on right hand index and middle fingers and left hand index finger. 2) frequent and sudden urination.  Symptom onset:  1-2 weeks ago  Symptom intensity:  Severe  Symptom progression:  Worsening  Had these symptoms before:  Yes  Has tried/received treatment for these symptoms:  No    He eats 0-1 servings of fruits and vegetables daily.He consumes 0 sweetened beverage(s) daily.He exercises with enough effort to increase his heart rate 30 to 60 minutes per day.  He exercises with enough effort to increase his heart rate 7 days per week.   He is taking medications regularly.       Review of Systems  Constitutional, HEENT, cardiovascular, pulmonary, gi and gu systems are negative, except as otherwise noted.      Objective    BP  120/70   Pulse 78   Temp 97.3  F (36.3  C) (Tympanic)   Resp 16   Wt 78 kg (171 lb 14.4 oz)   SpO2 98%   BMI 26.92 kg/m    Body mass index is 26.92 kg/m .  Physical Exam   GENERAL: alert and no distress  EYES: Eyes grossly normal to inspection, PERRL and conjunctivae and sclerae normal  SKIN: right hand thumb peeling, dry skin with mild erythema   NEURO: Normal strength and tone, mentation intact and speech normal  PSYCH: mentation appears normal, affect normal/bright    Results for orders placed or performed in visit on 06/12/24 (from the past 24 hour(s))   UA with Microscopic reflex to Culture - lab collect    Specimen: Urine, Midstream   Result Value Ref Range    Color Urine Yellow Colorless, Straw, Light Yellow, Yellow    Appearance Urine Clear Clear    Glucose Urine Negative Negative mg/dL    Bilirubin Urine Negative Negative    Ketones Urine Negative Negative mg/dL    Specific Gravity Urine 1.020 1.003 - 1.035    Blood Urine Negative Negative    pH Urine 6.0 5.0 - 7.0    Protein Albumin Urine Negative Negative mg/dL    Urobilinogen Urine 0.2 0.2, 1.0 E.U./dL    Nitrite Urine Negative Negative    Leukocyte Esterase Urine Negative Negative   UA Microscopic with Reflex to Culture   Result Value Ref Range    RBC Urine None Seen 0-2 /HPF /HPF    WBC Urine None Seen 0-5 /HPF /HPF    Squamous Epithelials Urine Few (A) None Seen /LPF    Narrative    Urine Culture not indicated           Signed Electronically by: FRANKO Kingston CNP

## 2024-06-13 LAB — BACTERIA UR CULT: NORMAL

## 2024-06-13 NOTE — TELEPHONE ENCOUNTER
Answered in another MyChart from 06/12/24. Closing encounter.    Yasir HWANG RN  [facility]

## 2024-06-20 ENCOUNTER — THERAPY VISIT (OUTPATIENT)
Dept: PHYSICAL THERAPY | Facility: CLINIC | Age: 69
End: 2024-06-20
Attending: NURSE PRACTITIONER
Payer: MEDICARE

## 2024-06-20 DIAGNOSIS — R26.81 GAIT INSTABILITY: ICD-10-CM

## 2024-06-20 DIAGNOSIS — R29.898 BILATERAL LEG WEAKNESS: Primary | ICD-10-CM

## 2024-06-20 PROCEDURE — 97032 APPL MODALITY 1+ESTIM EA 15: CPT | Mod: GP,KX | Performed by: PHYSICAL THERAPIST

## 2024-06-20 PROCEDURE — 97110 THERAPEUTIC EXERCISES: CPT | Mod: GP,KX | Performed by: PHYSICAL THERAPIST

## 2024-06-20 PROCEDURE — 97112 NEUROMUSCULAR REEDUCATION: CPT | Mod: GP,KX | Performed by: PHYSICAL THERAPIST

## 2024-06-25 ENCOUNTER — MYC MEDICAL ADVICE (OUTPATIENT)
Dept: FAMILY MEDICINE | Facility: CLINIC | Age: 69
End: 2024-06-25
Payer: MEDICARE

## 2024-06-25 DIAGNOSIS — R60.0 EDEMA OF BOTH LOWER LEGS: Primary | ICD-10-CM

## 2024-07-02 ENCOUNTER — THERAPY VISIT (OUTPATIENT)
Dept: PHYSICAL THERAPY | Facility: CLINIC | Age: 69
End: 2024-07-02
Attending: NURSE PRACTITIONER
Payer: MEDICARE

## 2024-07-02 ENCOUNTER — OFFICE VISIT (OUTPATIENT)
Dept: UROLOGY | Facility: CLINIC | Age: 69
End: 2024-07-02
Payer: MEDICARE

## 2024-07-02 VITALS
OXYGEN SATURATION: 97 % | RESPIRATION RATE: 14 BRPM | TEMPERATURE: 97.5 F | HEIGHT: 67 IN | DIASTOLIC BLOOD PRESSURE: 79 MMHG | SYSTOLIC BLOOD PRESSURE: 131 MMHG | WEIGHT: 171 LBS | HEART RATE: 80 BPM | BODY MASS INDEX: 26.84 KG/M2

## 2024-07-02 DIAGNOSIS — N32.81 OVERACTIVE BLADDER: Primary | ICD-10-CM

## 2024-07-02 DIAGNOSIS — R60.0 EDEMA OF BOTH LOWER LEGS: ICD-10-CM

## 2024-07-02 PROCEDURE — 97140 MANUAL THERAPY 1/> REGIONS: CPT | Mod: GP,KX | Performed by: PHYSICAL THERAPIST

## 2024-07-02 PROCEDURE — 97161 PT EVAL LOW COMPLEX 20 MIN: CPT | Mod: GP,KX | Performed by: PHYSICAL THERAPIST

## 2024-07-02 PROCEDURE — 99202 OFFICE O/P NEW SF 15 MIN: CPT | Mod: 25 | Performed by: STUDENT IN AN ORGANIZED HEALTH CARE EDUCATION/TRAINING PROGRAM

## 2024-07-02 PROCEDURE — 51798 US URINE CAPACITY MEASURE: CPT | Performed by: STUDENT IN AN ORGANIZED HEALTH CARE EDUCATION/TRAINING PROGRAM

## 2024-07-02 RX ORDER — CHOLESTYRAMINE LIGHT 4 G/5.7G
POWDER, FOR SUSPENSION ORAL DAILY
COMMUNITY
Start: 2024-05-28

## 2024-07-02 ASSESSMENT — PAIN SCALES - GENERAL: PAINLEVEL: NO PAIN (0)

## 2024-07-02 NOTE — PROGRESS NOTES
UROLOGY OUTPATIENT VISIT      Chief Complaint:   Urinary frequency      Synopsis   Se Quinonez is a 69 year old male seen in consultation from [facility] regarding urinary frequency.    Patient was diagnosed with prostate cancer on 8/17/22 PSA: 28.3 Jose Score: 4+3 Stage: T2a Staging evaluation: Prostate MRI, Bone scan Prostate volume: 40 grams AUA / IPSS 1     High risk stage Ivb (T2N1M1) metastatic hormone sensitive prostate adenocarcinoma with bone and diffuse antionette metastasis below and above the diaphragm s/p chemo-hormonal treatment (Docetaxel, last Jan 2023), radiation in March-April 2023, on androgen deprivation therapy + abiraterone/prednisone + Olaparib.     He is here for urinary frequency  He voids around every 45 minutes during daytime and about 3 times at night time  It started about 2 years ago and seems to have worsened  He tried tamsulosin 0.4 mg daily and he didn't notice any improvement  +urgency but some urge incontinence  Given up caffeine  Drinks 1.5 liters  Has Crohn's disease, has loose stools.     BPH related history:  -- Urinary tract infections  -- Urinary tract stones  -- Gross hematuria  + Elevated PSA  -- History of Urinary retention  -- Chronic kidney disease  -- Prior BPH procedure  + Prior Prostate biopsy   + History of prostate cancer    PVR measured by US was 12 ml         The following  distinct labs were reviewed    I personally reviewed all applicable laboratory data and went over findings with patient  Significant for:    CBC RESULTS:  Recent Labs   Lab Test 09/18/23  1151 09/07/23  1440 04/05/23  0759 07/30/18  1546   WBC 5.2 5.1 4.5 8.2   HGB 11.6* 11.7* 13.4 14.2    236 219 264        BMP RESULTS:  Recent Labs   Lab Test 04/11/24  1138 12/06/23  1028 09/07/23  1440 07/08/22  1039 07/30/18  1546    138 136 138 138   POTASSIUM 3.9 4.1 4.1 4.5 4.0   CHLORIDE 105 103 100 108 105   CO2 25 27 23 25 21*   ANIONGAP 12 8 13 5 12   * 109* 139* 90 117    BUN 15.3 12.5 12.5 15 17   CR 0.85 0.74 0.82 0.84 0.86   GFRESTIMATED >90 >90 >90 >90 >60   GFRESTBLACK  --   --   --   --  >60       HGB A1C RESULTS:  Lab Results   Component Value Date    A1C 5.2 04/11/2024    A1C 5.1 11/27/2023    A1C 6.6 08/21/2023    A1C 5.3 07/08/2022    A1C 5.8 07/30/2018       UA RESULTS:   Recent Labs   Lab Test 06/12/24  0805 09/01/23  0913   SG 1.020 1.015   URINEPH 6.0 6.5   NITRITE Negative Negative   RBCU None Seen 0-2   WBCU None Seen 0-5       PSA RESULTS  Prostate Specific Antigen Screen   Date Value Ref Range Status   07/08/2022 28.30 (H) 0.00 - 4.00 ug/L Final       Medical Comorbidities      Past Medical History:   Diagnosis Date    Arthritis 04/05/2023    Rheumatoid factor confirmed    Cancer (H) 09/2923    Prostate    Crohn disease (H)     Diabetes (H) 08/21/2023    Herpes zoster     Created by Conversion  Replacement Utility updated for latest IMO load    Postherpetic polyneuropathy     Created by Conversion                Medications     Current Outpatient Medications   Medication Sig Dispense Refill    abiraterone (ZYTIGA) 250 MG tablet Take 250 mg by mouth daily Taking 2 250mg daily      cholestyramine light (QUESTRAN) 4 GM packet daily      famotidine (PEPCID) 20 MG tablet Take 20 mg by mouth 2 times daily      leuprolide (LUPRON DEPOT) 22.5 MG kit Inject 22.5 mg into the muscle every 3 months      metFORMIN (GLUCOPHAGE XR) 500 MG 24 hr tablet TAKE 1 TABLET(500 MG) BY MOUTH DAILY WITH DINNER 90 tablet 1    multivitamin (CENTRUM SILVER) tablet Take 1 tablet by mouth 2 times daily      olaparib (LYNPARZA) 150 MG tablet Take 2 tablets (300 mg) by mouth 2 times daily      predniSONE (DELTASONE) 5 MG tablet Take 1 tablet (5 mg) by mouth daily      pregabalin (LYRICA) 25 MG capsule TAKE 1 CAPSULE(25 MG) BY MOUTH TWICE DAILY 60 capsule 2    triamcinolone (KENALOG) 0.1 % external cream Apply topically 2 times daily 30 g 0    UNABLE TO FIND every 6 months MEDICATION NAME: Melissa       acetaminophen (TYLENOL) 500 MG tablet Acetaminophen Oral  PO  PRN May take up to 650 mg, for arthralgias   active (Patient not taking: Reported on 7/2/2024)      blood glucose (NO BRAND SPECIFIED) test strip Use to test blood sugar one time daily or as directed. To accompany: Blood Glucose Monitor Brands: per insurance. 100 strip 6    blood glucose monitoring (NO BRAND SPECIFIED) meter device kit Use to test blood sugar one time daily or as directed. Preferred blood glucose meter OR supplies to accompany: Blood Glucose Monitor Brands: per insurance. 1 kit 0    furosemide (LASIX) 20 MG tablet TAKE 1 TABLET BY MOUTH EVERY DAY AS NEEDED FOR LEG EDEMA (Patient not taking: Reported on 6/12/2024) 30 tablet 0    thin (NO BRAND SPECIFIED) lancets Use with lanceting device. To accompany: Blood Glucose Monitor Brands: per insurance. 100 each 6     No current facility-administered medications for this visit.            Assessment/Plan   69 year old year old person with stage Ivb (T2N1M1) metastatic hormone sensitive prostate adenocarcinoma with bone and diffuse antionette metastasis    Urinary Frequency - I think this is due to overactive bladder. He is emptying his bladder well, his stream is fine. This could be combination of age, XRT. I recommend we start with conservative measured with modifying fluid intake, avoiding bladder irritants, and urge supression techniques, I will also have him see pelvic floor physical therapy to help. I also gave him list of OAB medications to see which is covered by his insurance we could start if this initial trial fails. I would like to see him back in around 2 months to reassess symptoms    August Betancourt MD    CC:  Ashley Akhtar  Time spent:  25 minutes spent by me on the date of the encounter doing chart review, review of outside records, patient visit, and documentation

## 2024-07-02 NOTE — NURSING NOTE
"Chief Complaint   Patient presents with    Consult     Per pt. Frequent urination [R35.0], History of prostate cancer [Z85.46] Ref by Ashley Akhtar . Recs in epic, location verified.       Vitals:    07/02/24 1242   BP: 131/79   BP Location: Left arm   Patient Position: Sitting   Cuff Size: Adult Regular   Pulse: 80   Resp: 14   Temp: 97.5  F (36.4  C)   TempSrc: Tympanic   SpO2: 97%   Weight: 77.6 kg (171 lb)   Height: 1.702 m (5' 7\")     Wt Readings from Last 1 Encounters:   07/02/24 77.6 kg (171 lb)       Active order to obtain bladder scan? Yes   Name of ordering provider: [place]   Bladder scan preformed post void yes.    Bladder scan reveled 12ML  Provider notified?  Yes    Senait RAMOS, GILBERT RAMOS CMA.................7/2/2024    "

## 2024-07-02 NOTE — PROGRESS NOTES
PHYSICAL THERAPY EVALUATION  Type of Visit: Evaluation       Fall Risk Screen:  Fall screen completed by: PT  Have you fallen 2 or more times in the past year?: Yes  Have you fallen and had an injury in the past year?: No  Is patient a fall risk?: Department fall risk interventions implemented  Fall screen comments: patient seeing PT currently    Subjective       Presenting condition or subjective complaint: continuing lymphedema with a concern about future effects  Date of onset: 06/25/24 (date of referral)    Relevant medical history: Arthritis; Bladder or bowel problems; Cancer; Diabetes; Foot drop; Hearing problems; Radiation treatment   Dates & types of surgery: cataract and epiretnal membrane removal    Prior diagnostic imaging/testing results:       Prior therapy history for the same diagnosis, illness or injury: Yes message therapy; 3 visits + a recommendation for compression socks    Prior Level of Function  Transfers: Assistive equipment  Ambulation: Assistive equipment    Living Environment  Social support: With a significant other or spouse   Type of home: [facility]   Stairs to enter the home: No       Ramp: No   Stairs inside the home: Yes 13 Is there a railing: Yes     Help at home: None  Equipment owned: Straight Cane     Employment: Yes   Hobbies/Interests: birding, reading, history    Patient goals for therapy: walk normally    Pain assessment:  discomfort/pain from neuropathy - RLE to knee and LLE to mid-calf     Objective       EDEMA EVALUATION  Additional history:  Body part affected by edema: Legs  If cancer related, treatment: chemo and radiation  If not cancer related, problems with veins or cause of swelling:    Distance able to walk: with a cane; 1 mile  Time able to stand: 1/2 hour  Sensation problems in hands/feet: Yes Neuropathy in both feet  Edema etiology: Chemo, Radiation, history of prostate cancer; chemo ended January 2023, then had 28 sessions of radiation around  March 2023. Foot drop showed up after radiation, has had several falls - currently seeing PT.  Pt reports swelling first started October 2023. Pt wears knee hi compression stockings every day, reports current stockings are too long. Was measured by [facility] in [place] but doesn't use those stockings anymore.  Pt has been taught MLD before for his legs and was doing it daily from Dec 2023 - March 2024 and then quit.     FUNCTIONAL SCALES  LLIS = 31    Cognitive Status Examination  Orientation: Oriented to person, place and time   Level of Consciousness: Alert  Follows Commands and Answers Questions: 100% of the time  Personal Safety and Judgement: Intact  Memory: Intact    EDEMA  Skin Condition:  skin all intact with mild edema at bilateral dorsum and then 1+ mild pitting present ankle to knee  Scar: No  Capillary Refill: Symmetrical  Dorsal Pedal Pulse: Symmetrical  Stemmer Sign: -  Ulceration: No    GIRTH MEASUREMENTS: Refer to separate girth measurement flowsheet.     VOLUME LE  Right LE (mL) 1997.33    Left LE (mL) 1938.45    LE Volume Comparison RLE volume greater than LLE volume   % Difference +2.9%   Head/Neck Volume     Trunk Volume    Genital Volume       RANGE OF MOTION: LE ROM WFL  STRENGTH: LE Strength WFL, R known foot drop seeing PT for  POSTURE:  normal  PALPATION: denies sensitivities during pitting assessment/palpation  ACTIVITIES OF DAILY LIVING: mod-I using cane  GAIT/LOCOMOTION: mod-I using cane  BALANCE:  seeing PT  SENSATION:  known neuropathy  VASCULAR:  no concerns  COORDINATION: WFL  MUSCLE TONE: WFL    Assessment & Plan   CLINICAL IMPRESSIONS  Medical Diagnosis: edema of both lower legs    Treatment Diagnosis: BLE secondary lymphedema   Impression/Assessment: Patient is a 69 year old male with BLE lympehdema complaints.  The following significant findings have been identified: Edema. These impairments interfere with their ability to perform household mobility and community  mobility as compared to previous level of function.     Clinical Decision Making (Complexity):  Clinical Presentation: Stable/Uncomplicated  Clinical Presentation Rationale: based on medical and personal factors listed in PT evaluation  Clinical Decision Making (Complexity): Low complexity    PLAN OF CARE  Treatment Interventions:  Interventions: Manual Therapy, Therapeutic Exercise, Self-Care/Home Management, compression garment fitting/training    Long Term Goals     PT Goal 1  Goal Identifier: 1  Goal Description: Pt and wife to be independent with simplified MLD sequence of BLEs and performing at least 4days/week to assist in longterm lymphedema management/control  Rationale: to maximize safety and independence within the community;to maximize safety and independence with performance of ADLs and functional tasks  Target Date: 08/27/24  PT Goal 2  Goal Identifier: 2  Goal Description: pt to be independent with donning, doffing and care of new compression stockings for longterm lymphedema management for maintenance  Rationale: to maximize safety and independence with self cares  Target Date: 08/27/24  PT Goal 3  Goal Identifier: 3  Goal Description: pt to be independent with longterm BLE lymphedema management via HEP, elevation, skin cares, self MLD and compression garment wear/use  Rationale: to maximize safety and independence with self cares;to maximize safety and independence with performance of ADLs and functional tasks  Target Date: 08/27/24      Frequency of Treatment: Other (2-3 additional appts only anticipated over the next 8 weeks)  Duration of Treatment: 8 weeks    Recommended Referrals to Other Professionals:  n/a  Education Assessment:   Learner/Method: Patient;Listening;Demonstration;No Barriers to Learning    Risks and benefits of evaluation/treatment have been explained.   Patient/Family/caregiver agrees with Plan of Care.     Evaluation Time:     PT Eval, Low Complexity Minutes (96043): 10      Signing Clinician: Kim Barnes, PT, DPT, CLT        [facility]                                                                                   OUTPATIENT PHYSICAL THERAPY      PLAN OF TREATMENT FOR OUTPATIENT REHABILITATION   Patient's Last Name, First Name, Se Monterroso YOB: 1955   Provider's Name   [facility]   Medical Record No.  [id number]     Onset Date: 06/25/24 (date of referral)  Start of Care Date: 07/02/24     Medical Diagnosis:  edema of both lower legs      PT Treatment Diagnosis:  BLE secondary lymphedema Plan of Treatment  Frequency/Duration: Other (2-3 additional appts only anticipated over the next 8 weeks)/ 8 weeks    Certification date from 07/02/24 to 08/27/24         See note for plan of treatment details and functional goals     Kim Barnes, PT, DPT, CLT                         I CERTIFY THE NEED FOR THESE SERVICES FURNISHED UNDER        THIS PLAN OF TREATMENT AND WHILE UNDER MY CARE     (Physician attestation of this document indicates review and certification of the therapy plan).              Referring Provider:  Ashley Akhtar CNP    Initial Assessment  See Epic Evaluation- Start of Care Date: 07/02/24

## 2024-07-03 ENCOUNTER — THERAPY VISIT (OUTPATIENT)
Dept: PHYSICAL THERAPY | Facility: CLINIC | Age: 69
End: 2024-07-03
Attending: NURSE PRACTITIONER
Payer: MEDICARE

## 2024-07-03 DIAGNOSIS — R26.81 GAIT INSTABILITY: ICD-10-CM

## 2024-07-03 DIAGNOSIS — R29.898 BILATERAL LEG WEAKNESS: Primary | ICD-10-CM

## 2024-07-03 PROCEDURE — 97112 NEUROMUSCULAR REEDUCATION: CPT | Mod: GP,KX | Performed by: PHYSICAL THERAPIST

## 2024-07-03 PROCEDURE — 97110 THERAPEUTIC EXERCISES: CPT | Mod: GP,KX | Performed by: PHYSICAL THERAPIST

## 2024-07-10 ENCOUNTER — THERAPY VISIT (OUTPATIENT)
Dept: PHYSICAL THERAPY | Facility: CLINIC | Age: 69
End: 2024-07-10
Attending: STUDENT IN AN ORGANIZED HEALTH CARE EDUCATION/TRAINING PROGRAM
Payer: MEDICARE

## 2024-07-10 DIAGNOSIS — N32.81 OVERACTIVE BLADDER: ICD-10-CM

## 2024-07-10 PROCEDURE — 97530 THERAPEUTIC ACTIVITIES: CPT | Mod: GP,KX | Performed by: PHYSICAL THERAPIST

## 2024-07-10 PROCEDURE — 97162 PT EVAL MOD COMPLEX 30 MIN: CPT | Mod: GP,KX | Performed by: PHYSICAL THERAPIST

## 2024-07-10 PROCEDURE — 97110 THERAPEUTIC EXERCISES: CPT | Mod: GP,KX | Performed by: PHYSICAL THERAPIST

## 2024-07-10 NOTE — PROGRESS NOTES
PHYSICAL THERAPY EVALUATION  Type of Visit: Evaluation       Fall Risk Screen:  Fall screen completed by: PT  Have you fallen 2 or more times in the past year?: No (Fell 7/2/23 and 8/10/23; is being seen by Ortho PT for this)  Have you fallen and had an injury in the past year?: Yes (fell on face, no fx; but did seek medical attn)  Is patient a fall risk?: Yes; Department fall risk interventions implemented  Fall screen comments: patient seeing PT currently    Subjective     Pt was dx'd with Prostate Ca - rx included chemo and radiation, not a prostatectomy. Chemo ended in Jan 2023. Last Radiation was March/April 2023. Pt had sx's of OAB since around April of 2022. Primary c/o high frequency and urgency to void, with 5-7x up to void at nite. Has been able to stand still and hold urine - has dribbled on way to toilet sometimes. Secondarily, pt is being treated for (R) LE weakness with drop foot (of unknown origin).   Presenting condition or subjective complaint: Strong urges to urinate, mild urge UI, and nocturia of 5-7x per nite.   Date of onset: 07/02/24    Relevant medical history: Arthritis; Bladder or bowel problems; Cancer; Diabetes; Foot drop; Hearing problems; Radiation treatment   Dates & types of surgery: cataract and epiretnal membrane removal    Prior diagnostic imaging/testing results:       Prior therapy history for the same diagnosis, illness or injury: never had Pelvic Floor therapy    Prior Level of Function  Transfers: Independent  Ambulation: Independent  ADL: Independent    Living Environment  Social support: With a significant other or spouse   Type of home: [facility]   Stairs to enter the home: No       Ramp: No   Stairs inside the home: Yes 13 Is there a railing: Yes     Help at home: None  Equipment owned: Straight Cane     Employment: Yes   Hobbies/Interests: birding, reading, history    Patient goals for therapy: sleep thru the nite without getting up to pee    Pain  "assessment: Pain denied     Objective      PELVIC EVALUATION  ADDITIONAL HISTORY:  Sex assigned at birth: Male  Gender identity: Male    Pronouns: He/Him/His      Bladder History:  Feels bladder filling: No  Triggers for feeling of inability to wait to go to the bathroom:      How long can you wait to urinate: 45\"-90\"  Gets up at night to urinate: Yes    Can stop the flow of urine when urinating: Sometimes  Volume of urine usually released: Medium   Other issues: Dribbling after urinating  Number of bladder infections in last 12 months:    Fluid intake per day: 1200 mL      Medications taken for bladder: No     Activities causing urine leak: Hurrying to the bathroom due to a strong urge to urinate (pee)    Amount of urine typically leaked: a few drops  Pads used to help with leaking: No        Bowel History:  Frequency of bowel movement: 3-4 times/day  Consistency of stool: Soft-formed    Ignores the urge to defecate: No  Other bowel issues: Loss of stool  Length of time spent trying to have a bowel movement:      Sexual Function History:  Sexual orientation: Straight    Sexually active: No  Lubrication used: No No  Pelvic pain:      Pain or difficulty with orgasms/erection/ejaculation: Yes not sexually active  State of menopause:    Hormone medications: Yes abiraterone (zintiga) & lynparza (olaparib)    Are you currently pregnant: No  Have you been diagnosed with pelvic prolapse or abdominal separation: No  Do you get regular exercise: Yes  I do this type of exercise: home, Y machines and pool  Have you tried pelvic floor strengthening exercises for 4 weeks: No  Do you have any history of trauma that is relevant to your care that you d like to share: Yes, I d like to discuss it with my provider in person.      Discussed reason for referral regarding pelvic health needs and external/internal pelvic floor muscle examination with patient/guardian.  Opportunity provided to ask questions and verbal consent for " assessment and intervention was given.    POSTURE: Standing Posture: Rounded shoulders, Forward head, Lordosis decreased, Thoracic kyphosis increased  LUMBAR SCREEN: AROM WFL  HIP SCREEN:  Strength: WFL   Functional Strength Testing:  weakness (B) LE, being seen in Neuro/Ortho PT for this currently.     PELVIC/SI SCREEN:  WFL   PAIN PROVOCATION TEST: WFL  PELVIS/SI SPECIAL TESTS: WFL  BREATHING SYMMETRY: Decreased rib cage mobility    PELVIC EXAM  Deferred today based on time constraints of session.    ABDOMINAL ASSESSMENT  Diastasis Rectus Abdominis (MARYLOU):  Not formally assessed today    Abdominal Activation/Strength:  WFL for all transitions    Scar:   Location/Type: NA  Mobility:  NA    Fascial Tension/Restriction: WFL, but not formally assessed today    DERMATOMES:  No c/o saddle numbness or paresthesias  DTR S:  Not tested today    Assessment & Plan   CLINICAL IMPRESSIONS  Medical Diagnosis: Overactive bladder    Treatment Diagnosis: Pelvic Floor Dysfunction   Impression/Assessment: Patient is a 69 year old male with urgency and frequency of urination, and nocturia complaints.  The following significant findings have been identified: Decreased strength, Impaired balance, Impaired sensation, Impaired gait, Impaired muscle performance, Impaired posture, and Instability. These impairments interfere with their ability to perform self care tasks and recreational activities as compared to previous level of function.     Clinical Decision Making (Complexity):  Clinical Presentation: Unstable/Unpredictable   Clinical Presentation Rationale: based on medical and personal factors listed in PT evaluation  Clinical Decision Making (Complexity): Moderate complexity    PLAN OF CARE  Treatment Interventions:  Modalities: Biofeedback, E-stim, Ultrasound  Interventions: Manual Therapy, Neuromuscular Re-education, Therapeutic Activity, Therapeutic Exercise, Self-Care/Home Management    Long Term Goals     PT Goal 1  Goal  Identifier: STG  Goal Description: 1)Pt will sleep thru the nite with 3 or less times up to void each nite, in 4 weeks.  Target Date: 08/07/24  PT Goal 2  Goal Identifier: STG  Goal Description: 2)Pt will report no dribbles on way to the toilet with an urge, in 4 weeks.  Target Date: 08/07/24  PT Goal 3  Goal Identifier: LTG  Goal Description: 3)Pt will be up at nite to urinate no more than 2x each nite, in 8 weeks.  Target Date: 09/04/24  PT Goal 4  Goal Identifier: LTG  Goal Description: 4)Pt will report consistent void times of every 2-3 hours with manageable urges, in 8 weeks.  Target Date: 09/04/24  PT Goal 5  Goal Identifier: LTG  Goal Description: 5)Pt will be indep in HEP to prevent return of symptoms, in 8 weeks.  Target Date: 09/04/24      Frequency of Treatment: 1x every week, weaning to every other week  Duration of Treatment: 8 weeks    Recommended Referrals to Other Professionals:  none  Education Assessment:   Learner/Method: Patient;Listening;Reading;Demonstration;Pictures/Video;No Barriers to Learning    Risks and benefits of evaluation/treatment have been explained.   Patient/Family/caregiver agrees with Plan of Care.     Evaluation Time:     PT Eval, Moderate Complexity Minutes (40890): 15   Present: Not applicable     Signing Clinician: Alanna Paredes, PT        [facility]                                                                                   OUTPATIENT PHYSICAL THERAPY      PLAN OF TREATMENT FOR OUTPATIENT REHABILITATION   Patient's Last Name, First Name, Se Monterroso YOB: 1955   Provider's Name   [facility]   Medical Record No.  [id number]     Onset Date: 07/02/24  Start of Care Date: 07/10/24     Medical Diagnosis:  Overactive bladder      PT Treatment Diagnosis:  Pelvic Floor Dysfunction Plan of Treatment  Frequency/Duration: 1x every week, weaning to every other week/ 8  weeks    Certification date from 07/10/24 to 09/04/24         See note for plan of treatment details and functional goals     Alanna Paredes, PT                         I CERTIFY THE NEED FOR THESE SERVICES FURNISHED UNDER        THIS PLAN OF TREATMENT AND WHILE UNDER MY CARE     (Physician attestation of this document indicates review and certification of the therapy plan).              Referring Provider:  August Betancourt    Initial Assessment  See Epic Evaluation- Start of Care Date: 07/10/24

## 2024-07-15 ENCOUNTER — TRANSFERRED RECORDS (OUTPATIENT)
Dept: HEALTH INFORMATION MANAGEMENT | Facility: CLINIC | Age: 69
End: 2024-07-15
Payer: MEDICARE

## 2024-07-18 ENCOUNTER — THERAPY VISIT (OUTPATIENT)
Dept: PHYSICAL THERAPY | Facility: CLINIC | Age: 69
End: 2024-07-18
Attending: NURSE PRACTITIONER
Payer: MEDICARE

## 2024-07-18 DIAGNOSIS — R26.81 GAIT INSTABILITY: ICD-10-CM

## 2024-07-18 DIAGNOSIS — R29.898 BILATERAL LEG WEAKNESS: Primary | ICD-10-CM

## 2024-07-18 PROCEDURE — 97110 THERAPEUTIC EXERCISES: CPT | Mod: GP,KX | Performed by: PHYSICAL THERAPIST

## 2024-07-18 NOTE — PROGRESS NOTES
DISCHARGE  Reason for Discharge: Patient has met all goals. Strength measures approx same as prior progress note, hip ER strength improved on R. Pt is inep in home program      Discharge Plan: Patient to continue home program.    Referring Provider:  Ashley Akhtar     07/18/24 0500   Appointment Info   Signing clinician's name / credentials Kris Hoenk,PT   Total/Authorized Visits KX needed   Visits Used 22 note done at visit 15   Medical Diagnosis gait instability, B LE weakness, neuropathy   Quick Adds Certification   Progress Note/Certification   Start of Care Date 01/02/24   Onset of illness/injury or Date of Surgery 05/01/23  (approx date)   Therapy Frequency 1x every other week   Predicted Duration 8wks   Certification date from 06/04/24   Certification date to 07/30/24   KX Modifier Statement Therapy services meets medical necessity requirements beyond the therapy threshold for ongoing care.   Progress Note Completed Date 06/06/24   GOALS   PT Goals 2;3;4;5;6;7   PT Goal 1   Goal Identifier 1 still valid, extend date to 4/9   Goal Description will obtain AFO  in order to ambulate indep household distance w/o asst device in 6wk   Goal Progress has AFO, using cane, can go w/o device, not comfortable for LB   Target Date 02/13/24   Date Met 03/29/24   PT Goal 2   Goal Identifier 2still valid, extend date to 4/9   Goal Description pt will be able to do reciprocal stairs up, (not down) in 8wk   Goal Progress mostly one at a time, can do both,  has to pull on arms a lot   Target Date 02/27/24   Date Met 04/11/24   PT Goal 3   Goal Identifier 3 still valid, extend date to 6/4   Goal Description pt will be able to walk community distances w/ least restrictive device in 8wk for shopping, social outings   Goal Progress walked outdoors at art fairs   Target Date 02/27/24   Date Met 06/06/24   PT Goal 4   Goal Identifier 4 still valid extend to 6/4   Goal Description will be kwaku to  walk in his home w/o  assistve device in 8wk   Goal Progress able to do this   Target Date 04/23/24   Date Met 05/10/24   PT Goal 5   Goal Identifier 5   Goal Description pt will be kwaku to walk with cane on uneven ground - grass, parking lot in 8wk   Goal Progress walked in grass at visit today, sideways on hil most difficult   Target Date 04/23/24   Date Met 04/11/24   PT Goal 6   Goal Identifier 6 new 4/19   Goal Description pt will be able to walk with AFO without cane for community ambulation 1/4 mile/shopping in 6wk   Goal Progress uses cane outdoors, does inside his house   Target Date 05/31/24   Date Met 07/18/24   PT Goal 7   Goal Identifier 7 new   Goal Description will be able to walk in home without AFO up to 2-3 hours in 8wk   Goal Progress goes without it most all the time   Target Date 07/30/24   Date Met 07/18/24   Subjective Report   Subjective Report went biking on [place] side by side, only to gym once. still likes to have cane outside for wide open spaces, walking down steep hill difficult   Objective Measures   Objective Measures Objective Measure 1;Objective Measure 2   Objective Measure 1   Objective Measure MMT hip flex R4, L 4+, hip abd R 2-, L 2 to 2+, glut med/clam L 4-, R 3-3+, hip ext R2, L 3 - very limited hip extension ROM R worse than L, ankle DF R1/5, can see ant tib tendon activation cannot feel ant tib muscle firing, R inversion 2-2+/5, EV trace can see beginning of AROM,, PF 2+-3/5 can do B heel raises   Details not wearing AFO - R foot drop with gait, slight maarch L lateral lean to clear R foot, some evidence of imbalance in walking around gym today, occas feet cross midline. SLS not tested today,has been 2-4 seconds   Treatment Interventions (PT)   Interventions Therapeutic Procedure/Exercise;Gait Training;Neuromuscular Re-education   Therapeutic Procedure/Exercise   Therapeutic Procedures: strength, endurance, ROM, flexibility minutes (53307) 30   Ther Proc 1 - Details walked 2 laps ingym w/o AFO no  "cane, SL hip abd x10L, x3R,  SL clam x10 B,  6\" step down L 2x10, R cue to lower slow - cannot control lowering from 6\" step,, more pullinig on arms to go up x10   Skilled Intervention finalizing HEP: not wearing AFO at all today, strength ex to continue HEP, improve walking, decrease falls   Patient Response/Progress improving strength, hip abd weaker than extension   Education   Learner/Method Patient;Demonstration;No Barriers to Learning   Plan   Home program ex listed   Plan for next session will continue indep with HEP at this time, pt feel ing progress has plateaued a bit, knows exercises to do, should be better about going to gym     [facility]  Kris Hoenk  PT  [facility]  [address] [place] [address]  [email or web address]   Office: [phone]  Voicemail: [phone]    "

## 2024-08-01 ENCOUNTER — TRANSFERRED RECORDS (OUTPATIENT)
Dept: HEALTH INFORMATION MANAGEMENT | Facility: CLINIC | Age: 69
End: 2024-08-01
Payer: MEDICARE

## 2024-08-05 ENCOUNTER — THERAPY VISIT (OUTPATIENT)
Dept: PHYSICAL THERAPY | Facility: CLINIC | Age: 69
End: 2024-08-05
Attending: NURSE PRACTITIONER
Payer: MEDICARE

## 2024-08-05 DIAGNOSIS — N32.81 OVERACTIVE BLADDER: Primary | ICD-10-CM

## 2024-08-05 PROCEDURE — 97535 SELF CARE MNGMENT TRAINING: CPT | Mod: GP,XU | Performed by: PHYSICAL THERAPIST

## 2024-08-05 PROCEDURE — 90912 BFB TRAINING 1ST 15 MIN: CPT | Mod: GP | Performed by: PHYSICAL THERAPIST

## 2024-08-05 PROCEDURE — 97110 THERAPEUTIC EXERCISES: CPT | Mod: GP | Performed by: PHYSICAL THERAPIST

## 2024-08-05 PROCEDURE — 90913 BFB TRAINING EA ADDL 15 MIN: CPT | Mod: GP | Performed by: PHYSICAL THERAPIST

## 2024-08-07 ENCOUNTER — TRANSFERRED RECORDS (OUTPATIENT)
Dept: HEALTH INFORMATION MANAGEMENT | Facility: CLINIC | Age: 69
End: 2024-08-07
Payer: MEDICARE

## 2024-08-12 ENCOUNTER — THERAPY VISIT (OUTPATIENT)
Dept: PHYSICAL THERAPY | Facility: CLINIC | Age: 69
End: 2024-08-12
Attending: NURSE PRACTITIONER
Payer: MEDICARE

## 2024-08-12 DIAGNOSIS — R26.81 GAIT INSTABILITY: ICD-10-CM

## 2024-08-12 DIAGNOSIS — R29.898 BILATERAL LEG WEAKNESS: Primary | ICD-10-CM

## 2024-08-12 PROCEDURE — 999N000104 HC STATISTIC NO CHARGE: Performed by: PHYSICAL THERAPIST

## 2024-08-12 PROCEDURE — 97140 MANUAL THERAPY 1/> REGIONS: CPT | Mod: GP,CQ,KX | Performed by: REHABILITATION PRACTITIONER

## 2024-08-12 NOTE — PROGRESS NOTES
DISCHARGE  Reason for Discharge: Patient has met all goals.    Equipment Issued: BLE knee high compression garments    Discharge Plan: Patient to continue home program.    Referring Provider:  Ashley Akhtar    08/12/24 0500   Appointment Info   Signing clinician's name / credentials Brenda Warner PT CLT   Total/Authorized Visits KX needed   Visits Used 2 BLE/[place]/Medicare   Medical Diagnosis edema of both lower legs   PT Tx Diagnosis BLE secondary lymphedema   Progress Note/Certification   Start of Care Date 07/02/24   Onset of illness/injury or Date of Surgery 06/25/24  (date of referral)   Therapy Frequency Other  (2-3 additional appts only anticipated over the next 8 weeks)   Predicted Duration 8 weeks   Certification date from 07/02/24   Certification date to 08/27/24   KX Modifier Statement Therapy services meets medical necessity requirements beyond the therapy threshold for ongoing care.   Progress Note Due Date 08/01/24   Progress Note Completed Date 07/02/24   Supervision   Assistant Supervision PTA visit observed, intervention appropriate, plan of care reviewed and remains appropriate   PT Assistant Visit Number 1       Present No   PT Goal 1   Goal Identifier 1   Goal Description Pt and wife to be independent with simplified MLD sequence of BLEs and performing at least 4days/week to assist in longterm lymphedema management/control   Rationale to maximize safety and independence within the community;to maximize safety and independence with performance of ADLs and functional tasks   Target Date 08/27/24   Date Met 08/12/24   PT Goal 2   Goal Identifier 2   Goal Description pt to be independent with donning, doffing and care of new compression stockings for longterm lymphedema management for maintenance   Rationale to maximize safety and independence with self cares   Target Date 08/27/24   Date Met 08/12/24   PT Goal 3   Goal Identifier 3   Goal Description pt to be  independent with longterm BLE lymphedema management via HEP, elevation, skin cares, self MLD and compression garment wear/use   Rationale to maximize safety and independence with self cares;to maximize safety and independence with performance of ADLs and functional tasks   Target Date 08/27/24   Date Met 08/12/24   Subjective Report   Subjective Report easier to get the socks on vs taking them off, basically wanted to make sure these new ones are the right strength   Objective Measures   Objective Measures Objective Measure 1   Objective Measure 1   Objective Measure girth   Details (last taken on eval) R LE -11.4%, L LE -7.3%   Treatment Interventions (PT)   Interventions Manual Therapy   Manual Therapy   Manual Therapy: Mobilization, MFR, MLD, friction massage minutes (03148) 30   Skilled Intervention CDT education; MLD; compression stocking measuring/ordering   Plan   Home program daily compression stocking wear; self MLD of BLEs   Updates to plan of care return after wearing new stockings for ~ 1 week   Plan for next session BLE girth, assess fit of new stockings and make sure pt likes, any questions/concerns with MLD or HEP, possible dc if no issues   Comments   Comments 20-30mmHg knee hi Medi Plus, 2 tan and 1 black, CT, NO silicone border, size 3 regular length   Total Session Time   Timed Code Treatment Minutes 30   Total Treatment Time (sum of timed and untimed services) 30

## 2024-08-14 ENCOUNTER — MYC MEDICAL ADVICE (OUTPATIENT)
Dept: FAMILY MEDICINE | Facility: CLINIC | Age: 69
End: 2024-08-14
Payer: MEDICARE

## 2024-08-14 DIAGNOSIS — R20.0 BILATERAL LEG NUMBNESS: ICD-10-CM

## 2024-08-14 DIAGNOSIS — D64.9 ANEMIA, UNSPECIFIED TYPE: Primary | ICD-10-CM

## 2024-08-14 DIAGNOSIS — R29.898 WEAKNESS OF RIGHT FOOT: Primary | ICD-10-CM

## 2024-08-15 ENCOUNTER — LAB (OUTPATIENT)
Dept: LAB | Facility: CLINIC | Age: 69
End: 2024-08-15
Payer: MEDICARE

## 2024-08-15 DIAGNOSIS — T38.0X5A STEROID-INDUCED DIABETES MELLITUS, INITIAL ENCOUNTER (H): Primary | ICD-10-CM

## 2024-08-15 DIAGNOSIS — D64.9 ANEMIA, UNSPECIFIED TYPE: ICD-10-CM

## 2024-08-15 DIAGNOSIS — E09.9 STEROID-INDUCED DIABETES MELLITUS, INITIAL ENCOUNTER (H): Primary | ICD-10-CM

## 2024-08-15 PROCEDURE — 36415 COLL VENOUS BLD VENIPUNCTURE: CPT

## 2024-08-15 PROCEDURE — 82607 VITAMIN B-12: CPT

## 2024-08-16 LAB — VIT B12 SERPL-MCNC: 768 PG/ML (ref 232–1245)

## 2024-08-18 ENCOUNTER — HEALTH MAINTENANCE LETTER (OUTPATIENT)
Age: 69
End: 2024-08-18

## 2024-08-19 ENCOUNTER — TRANSFERRED RECORDS (OUTPATIENT)
Dept: HEALTH INFORMATION MANAGEMENT | Facility: CLINIC | Age: 69
End: 2024-08-19
Payer: MEDICARE

## 2024-08-19 NOTE — TELEPHONE ENCOUNTER
Forwarding MyChart to PCP for input per patient's request.    Noa Riddle RN  [facility]

## 2024-08-21 NOTE — TELEPHONE ENCOUNTER
Routed to provider.  Please see MyChart message from pt asking for referral to a different neurologist?  Natasha Longo RN

## 2024-08-22 ENCOUNTER — MYC MEDICAL ADVICE (OUTPATIENT)
Dept: FAMILY MEDICINE | Facility: CLINIC | Age: 69
End: 2024-08-22
Payer: MEDICARE

## 2024-08-22 ENCOUNTER — TELEPHONE (OUTPATIENT)
Dept: NEUROLOGY | Facility: CLINIC | Age: 69
End: 2024-08-22
Payer: MEDICARE

## 2024-08-22 DIAGNOSIS — R26.81 GAIT INSTABILITY: Primary | ICD-10-CM

## 2024-08-22 DIAGNOSIS — R29.898 BILATERAL LEG WEAKNESS: ICD-10-CM

## 2024-08-22 DIAGNOSIS — M21.371 RIGHT FOOT DROP: ICD-10-CM

## 2024-08-22 NOTE — TELEPHONE ENCOUNTER
[facility] Call Center    Phone Message    May a detailed message be left on voicemail: yes     Reason for Call: Appointment Intake    Referring Provider Name: Ashley Akhtar APRN CNP   Diagnosis and/or Symptoms: Weakness of right foot, Bilateral leg numbness, chronic right LE weakness and numbness, check for possible MS    Se received a referral for a 2nd opinion for the above diagnosis.    Se stated that he is not wanting to be seen in [place] any longer and would like to schedule at [place].    Action Taken: Message routed to:  [facility]    Travel Screening: Not Applicable     Date of Service:

## 2024-08-22 NOTE — TELEPHONE ENCOUNTER
Ayan sent to pt with Dr. Lozano' message.  (See previous note)    Mehreen KAUR RN, BSN  [facility]

## 2024-08-22 NOTE — TELEPHONE ENCOUNTER
[facility] Call Center    Phone Message    May a detailed message be left on voicemail: yes     Reason for Call: Other: Pt is returning call back to care team.  Pt can be reached at: [phone].  Pt stated information can be left on Pt MyChart if no further questions are needed.     Action Taken: Message routed to:  [facility] Neurology     Travel Screening: Not Applicable     Date of Service:

## 2024-08-22 NOTE — TELEPHONE ENCOUNTER
RN had Dr. Lozano review request for 2nd opinion.     Per Dr. Lozano:  1) Based on my review of patient's history and chart, he does NOT have MS. The findings of his neuro exam can NOT be explained by MS  2) I agree with Dr Haas's diagnosis and plan, and I do not have anything more to offer  3) Based on #1 and #2 appointment with me in [place] is probably unnecessary    Thank you     Attempted to call pt to let them know we are unable to accept transfer of care. No other neuromuscular specialist is in [place].     [facility]     Mehreen KAUR RN, BSN  [facility]

## 2024-08-26 ENCOUNTER — LAB (OUTPATIENT)
Dept: LAB | Facility: CLINIC | Age: 69
End: 2024-08-26
Payer: MEDICARE

## 2024-08-26 ENCOUNTER — TELEPHONE (OUTPATIENT)
Dept: NEUROLOGY | Facility: CLINIC | Age: 69
End: 2024-08-26

## 2024-08-26 DIAGNOSIS — E09.9 STEROID-INDUCED DIABETES MELLITUS, INITIAL ENCOUNTER (H): ICD-10-CM

## 2024-08-26 DIAGNOSIS — T38.0X5A STEROID-INDUCED DIABETES MELLITUS, INITIAL ENCOUNTER (H): ICD-10-CM

## 2024-08-26 LAB — HBA1C MFR BLD: 5.4 % (ref 0–5.6)

## 2024-08-26 PROCEDURE — 36415 COLL VENOUS BLD VENIPUNCTURE: CPT

## 2024-08-26 PROCEDURE — 83036 HEMOGLOBIN GLYCOSYLATED A1C: CPT

## 2024-08-26 NOTE — TELEPHONE ENCOUNTER
[facility] Call Center    Phone Message    May a detailed message be left on voicemail: yes     Reason for Call: Other: Pt stated they have a upcoming appointment with his PCP and will discuss options further at that time.      Action Taken: Message routed to:  [facility] Neurology     Travel Screening: Not Applicable     Date of Service:

## 2024-08-28 ENCOUNTER — THERAPY VISIT (OUTPATIENT)
Dept: PHYSICAL THERAPY | Facility: CLINIC | Age: 69
End: 2024-08-28
Attending: STUDENT IN AN ORGANIZED HEALTH CARE EDUCATION/TRAINING PROGRAM
Payer: MEDICARE

## 2024-08-28 DIAGNOSIS — N32.81 OVERACTIVE BLADDER: Primary | ICD-10-CM

## 2024-08-28 PROCEDURE — 90901 BIOFEEDBACK TRAIN ANY METH: CPT | Mod: GP,KX | Performed by: PHYSICAL THERAPIST

## 2024-08-28 PROCEDURE — 97112 NEUROMUSCULAR REEDUCATION: CPT | Mod: GP,KX | Performed by: PHYSICAL THERAPIST

## 2024-08-28 PROCEDURE — 97110 THERAPEUTIC EXERCISES: CPT | Mod: GP,KX,XU | Performed by: PHYSICAL THERAPIST

## 2024-08-29 ENCOUNTER — OFFICE VISIT (OUTPATIENT)
Dept: FAMILY MEDICINE | Facility: CLINIC | Age: 69
End: 2024-08-29
Payer: MEDICARE

## 2024-08-29 VITALS
HEIGHT: 67 IN | DIASTOLIC BLOOD PRESSURE: 82 MMHG | WEIGHT: 172.4 LBS | RESPIRATION RATE: 16 BRPM | HEART RATE: 75 BPM | TEMPERATURE: 97.5 F | BODY MASS INDEX: 27.06 KG/M2 | SYSTOLIC BLOOD PRESSURE: 122 MMHG | OXYGEN SATURATION: 98 %

## 2024-08-29 DIAGNOSIS — Z00.00 ANNUAL PHYSICAL EXAM: Primary | ICD-10-CM

## 2024-08-29 DIAGNOSIS — Z86.2 HISTORY OF ANEMIA: ICD-10-CM

## 2024-08-29 DIAGNOSIS — E55.9 VITAMIN D DEFICIENCY: ICD-10-CM

## 2024-08-29 LAB
ANION GAP SERPL CALCULATED.3IONS-SCNC: 9 MMOL/L (ref 7–15)
BASOPHILS # BLD AUTO: 0 10E3/UL (ref 0–0.2)
BASOPHILS NFR BLD AUTO: 0 %
BUN SERPL-MCNC: 16.7 MG/DL (ref 8–23)
CALCIUM SERPL-MCNC: 8.8 MG/DL (ref 8.8–10.4)
CHLORIDE SERPL-SCNC: 106 MMOL/L (ref 98–107)
CREAT SERPL-MCNC: 0.92 MG/DL (ref 0.67–1.17)
EGFRCR SERPLBLD CKD-EPI 2021: 89 ML/MIN/1.73M2
EOSINOPHIL # BLD AUTO: 0 10E3/UL (ref 0–0.7)
EOSINOPHIL NFR BLD AUTO: 0 %
ERYTHROCYTE [DISTWIDTH] IN BLOOD BY AUTOMATED COUNT: 15.4 % (ref 10–15)
GLUCOSE SERPL-MCNC: 102 MG/DL (ref 70–99)
HCO3 SERPL-SCNC: 27 MMOL/L (ref 22–29)
HCT VFR BLD AUTO: 36.1 % (ref 40–53)
HGB BLD-MCNC: 12 G/DL (ref 13.3–17.7)
IMM GRANULOCYTES # BLD: 0 10E3/UL
IMM GRANULOCYTES NFR BLD: 0 %
LYMPHOCYTES # BLD AUTO: 0.6 10E3/UL (ref 0.8–5.3)
LYMPHOCYTES NFR BLD AUTO: 9 %
MCH RBC QN AUTO: 35.1 PG (ref 26.5–33)
MCHC RBC AUTO-ENTMCNC: 33.2 G/DL (ref 31.5–36.5)
MCV RBC AUTO: 106 FL (ref 78–100)
MONOCYTES # BLD AUTO: 0.4 10E3/UL (ref 0–1.3)
MONOCYTES NFR BLD AUTO: 7 %
NEUTROPHILS # BLD AUTO: 5.1 10E3/UL (ref 1.6–8.3)
NEUTROPHILS NFR BLD AUTO: 83 %
PLATELET # BLD AUTO: 166 10E3/UL (ref 150–450)
POTASSIUM SERPL-SCNC: 3.9 MMOL/L (ref 3.4–5.3)
RBC # BLD AUTO: 3.42 10E6/UL (ref 4.4–5.9)
SODIUM SERPL-SCNC: 142 MMOL/L (ref 135–145)
WBC # BLD AUTO: 6.2 10E3/UL (ref 4–11)

## 2024-08-29 PROCEDURE — 82306 VITAMIN D 25 HYDROXY: CPT | Performed by: NURSE PRACTITIONER

## 2024-08-29 PROCEDURE — 99213 OFFICE O/P EST LOW 20 MIN: CPT | Mod: 25 | Performed by: NURSE PRACTITIONER

## 2024-08-29 PROCEDURE — 80048 BASIC METABOLIC PNL TOTAL CA: CPT | Performed by: NURSE PRACTITIONER

## 2024-08-29 PROCEDURE — 36415 COLL VENOUS BLD VENIPUNCTURE: CPT | Performed by: NURSE PRACTITIONER

## 2024-08-29 PROCEDURE — G0439 PPPS, SUBSEQ VISIT: HCPCS | Performed by: NURSE PRACTITIONER

## 2024-08-29 PROCEDURE — 85025 COMPLETE CBC W/AUTO DIFF WBC: CPT | Performed by: NURSE PRACTITIONER

## 2024-08-29 ASSESSMENT — PAIN SCALES - GENERAL: PAINLEVEL: NO PAIN (0)

## 2024-08-29 NOTE — PROGRESS NOTES
Physical Therapy Progress and Recertification Note      [facility]                                                                                   OUTPATIENT PHYSICAL THERAPY    PLAN OF TREATMENT FOR OUTPATIENT REHABILITATION   Patient's Last Name, First Name, Se Monterroso YOB: 1955   Provider's Name   [facility]   Medical Record No.  [id number]     Onset Date: 07/02/24  Start of Care Date: 07/10/24     Medical Diagnosis:  Overactive bladder      PT Treatment Diagnosis:  Pelvic Floor Dysfunction Plan of Treatment  Frequency/Duration: 1x every week, weaning to every other week/ 8 weeks    Certification date from 09/04/24 to 10/23/24         See note for plan of treatment details and functional goals     Alanna Paredes PT                         I CERTIFY THE NEED FOR THESE SERVICES FURNISHED UNDER        THIS PLAN OF TREATMENT AND WHILE UNDER MY CARE     (Physician attestation of this document indicates review and certification of the therapy plan).              Referring Provider:  August Betancourt    Initial Assessment  See Epic Evaluation- Start of Care Date: 07/10/24            PLAN  Continue therapy per current plan of care.    Beginning/End Dates of Progress Note Reporting Period:  7/10/24 to 08/28/2024    Referring Provider:  August Betancourt MD     08/28/24 0500   Appointment Info   Signing clinician's name / credentials Alanna Paredes PT MA #[id number]   Total/Authorized Visits 8 ([facility]) - KX modifier needed   Visits Used 3   Medical Diagnosis Overactive bladder   PT Tx Diagnosis Pelvic Floor Dysfunction   Progress Note/Certification   Start of Care Date 07/10/24   Onset of illness/injury or Date of Surgery 07/02/24   Therapy Frequency 1x every week, weaning to every other week   Predicted Duration 8 weeks   Certification date from 09/04/24   Certification date to 10/23/24   KX Modifier Statement Therapy  "services meets medical necessity requirements beyond the therapy threshold for ongoing care.   Progress Note Due Date 10/23/24   Progress Note Completed Date 08/28/24   PT Goal 1   Goal Identifier STG   Goal Description 1)Pt will sleep thru the nite with 3 or less times up to void each nite, in 4 weeks.   Goal Progress Not Met: pt states is bettter, but is at 4x per nite up in a 9 hour sleep session.  (cont x 4 weeks)   Target Date 09/25/24   PT Goal 2   Goal Identifier STG   Goal Description 2)Pt will report no dribbles on way to the toilet with an urge, in 4 weeks.   Goal Progress Met: this is fairly new that he is not dribbling on way to toilet - was still having this about a week ago. Recheck in 4 weeks.   Target Date 09/25/24   PT Goal 3   Goal Identifier LTG   Goal Description 3)Pt will be up at nite to urinate no more than 2x each nite, in 8 weeks.   Goal Progress Not yet met: see goal #1 above.  (recheck in 8 weeks)   Target Date 10/23/24   PT Goal 4   Goal Identifier LTG   Goal Description 4)Pt will report consistent void times of every 2-3 hours with manageable urges, in 8 weeks.   Goal Progress Not Fully Met: [place] [employer], but this was \"odd for me\" and is more at every 2 hours at home.  (REcheck in 6 weeks.)   Target Date 10/09/24   PT Goal 5   Goal Identifier LTG   Goal Description 5)Pt will be indep in HEP to prevent return of symptoms, in 8 weeks.   Goal Progress Ongoing and updated each session   Target Date 10/23/24   Subjective Report   Subjective Report [place] and was able to void about every 3 hours.  This is abnormal, usually has to go more often.  About a week prior to now was going a lot and very frequently (\"like when we first started; about every 30-45 mins and had dribbling as well\").  This last week or so, gets an urge and was able to calm this down.  Not sure what happened during that \"one bad week.\"   Objective Measure 1   Objective Measure " "Void Times   Details Urine: Overnight = avg 4x per nite wakes with an urge; Daytime = home and office every 2 hours on avg   Objective Measure 2   Objective Measure Leaking   Details About 1 week ago had 1-2 eye droppers full for amount.  Now is not leaking in last week.   Objective Measure 3   Objective Measure Biofeedback   Details Not completed today.   Objective Measure 4   Objective Measure RUSI   Details TPUS: visible lift, slide and pinch; minimal ability to bulge but states \"I have not had a BM today and am nervous.\"  AP levator diam = 54mm (normal = 40-50mm) - signifies elongated puborectalis, possible weakness in PFM.  Anorectal angle = 110* this is indicative of a paradoxical contraction of the pelvic floor muscles during defecation   Biofeedback   Treatment Detail Obtained consent for rx from pt after explaining the procedure.  Completed TPUS RUSI (ultrasound) for viewing and identifying anatomy of the pelvic floor.  Used 3D model to further help pt understand what he will be moving with cues. Guided pt through contractions, relaxations and bulging motions.  Cued for correct performance and breathing.   Muscle Perineal: bulbo, ischio, coccygeus   Patient Response/Progress Pt states \"this is really helpful to be able to see the effect of my efforts, and know I am doing the mvmts correctly.\"   Pt very cautious of doing too much in the PFM based on training with BF in last session.  Aware of over exertion and use of abdominals.   Biofeedback Training Minutes Untimed (40943) 15   Time 15 min   Therapeutic Procedure/Exercise   Therapeutic Procedures: strength, endurance, ROM, flexibility minutes (66018) 25   Ther Proc 1 - Details Taught pt elevators, based on the smaller increments of mvmt in PFM learned last session with BF and this session with RUSI.  Explained the effort level should be graded to incrementally reach a hypothetical 1st floor, 2nd floor and 3rd floor with his PFM.  Then instructed to return " "to the baseline (basement).  Pt able to do 8 times in session with 10 sec rests before fatigue and less discernment of each \"floor.\"  Taught and practiced diaphragm breathing.  Pt needed extra cues to focus on not moving his upper chest.  Would point it out and pt would say he did not feel this extra inhale and lift of chest, but then was able to correct in next exer.   Skilled Intervention Exer: strengthening, awareness of PFM and breathing patterns; progression of HEP   Patient Response/Progress Pt needing multiple cues for diaphragm breathing.   Neuromuscular Re-education   Neuromuscular re-ed of mvmt, balance, coord, kinesthetic sense, posture, proprioception minutes (49647) 15   Neuro Re-ed 1 - Details Pt trained with PT watching for mvmts on RUSI and screen obscured from pt.  Cued for slide, lift and pinch motions with \"Close like trying not to pee, trying not poop or shortening your penis.\" Multiple reps completed for bulging motion for defecation as this was difficult to visualize on RUSI and not confident that pt can generate correct pattern yet.   Skilled Intervention NMR: PFM coord for continence and for defecation.   Patient Response/Progress Pt has adequate \"lift\" and visible but minimal pinch and slide.  Poor ability to bulge PFM today - may be d/t fear of defecation in session as he states he did not yet have a BM today.   Plan   Home program PTRx   Updates to plan of care Updated diaphragm breathing and added elevators   Plan for next session See pt in 2-3 weeks.  Cont to train with BF and/or RUSI.  Advance HEP.   Comments   Comments KX Modifier needed   Pelvic Health Informed Consent Statement Discussed with patient/guardian reason for referral regarding pelvic health needs and external/internal pelvic floor muscle examination.  Opportunity provided to ask questions and verbal consent for assessment and intervention was given.   Total Session Time   Timed Code Treatment Minutes 40   Total Treatment " Time (sum of timed and untimed services) 55   Thank you for the referral of this patient.  Alanna Paredes, PT, MA  #[id number]

## 2024-08-29 NOTE — PROGRESS NOTES
Preventive Care Visit  [facility]  FRANKO Kingston CNP, Family Medicine      Assessment & Plan     Annual physical exam    - Basic metabolic panel  (Ca, Cl, CO2, Creat, Gluc, K, Na, BUN); Future  - Basic metabolic panel  (Ca, Cl, CO2, Creat, Gluc, K, Na, BUN)    Vitamin D deficiency    - Vitamin D Deficiency; Future  - Vitamin D Deficiency    History of anemia  -mild macrocytic anemia,   -hemoglobin level improved, recent B 12 level and Folate levels were normal, liver function normal.   -will continue monitoring   - CBC with platelets and differential; Future  - CBC with platelets and differential      Counseling  Appropriate preventive services were addressed with this patient via screening, questionnaire, or discussion as appropriate for fall prevention, nutrition, physical activity, Tobacco-use cessation, social engagement, weight loss and cognition.  Checklist reviewing preventive services available has been given to the patient.    Right lower leg neuropathy, chronic foot drop  -scheduled with neurologist from [facility]     Abi Saez is a 69 year old, presenting for the followin/29/2024    12:44 PM   Additional Questions   Roomed by chiquita   Accompanied by self         2024    12:44 PM   Patient Reported Additional Medications   Patient reports taking the following new medications none     Health Care Directive  Patient does not have a Health Care Directive or Living Will: Advance Directive received and scanned. Click on Code in the patient header to view.          2024   General Health   How would you rate your overall physical health? Good   Feel stress (tense, anxious, or unable to sleep) Not at all            2024   Nutrition   Diet: Diabetic            2024   Exercise   Days per week of moderate/strenous exercise 3 days   Average minutes spent exercising at this level 30 min            2024   Social Factors   Frequency  of gathering with friends or relatives Twice a week   Worry food won't last until get money to buy more No   Food not last or not have enough money for food? No   Do you have housing? (Housing is defined as stable permanent housing and does not include staying ouside in a car, in a tent, in an abandoned building, in an overnight shelter, or couch-surfing.) Yes   Are you worried about losing your housing? No   Lack of transportation? No   Unable to get utilities (heat,electricity)? No            8/29/2024   Fall Risk   Reason Gait Speed Test Not Completed Patient declines             8/24/2024   Activities of Daily Living- Home Safety   Needs help with the following daily activites None of the above   Safety concerns in the home No grab bars in the bathroom            8/24/2024   Dental   Dentist two times every year? Yes            8/24/2024   Hearing Screening   Hearing concerns? (!) I NEED TO ASK PEOPLE TO SPEAK UP OR REPEAT THEMSELVES.    (!) IT'S HARDER TO UNDERSTAND WOMEN'S VOICES THAN MEN'S VOICES.    (!) IT'S HARD TO FOLLOW A CONVERSATION IN A NOISY RESTAURANT OR CROWDED ROOM.    (!) TROUBLE UNDESTANDING A SPEAKER IN A PUBLIC MEETING OR [employer] SERVICE.    (!) TROUBLE UNDERSTANDING SOFT OR WHISPERED SPEECH.    (!) TROUBLE UNDERSTANDING SPEECH ON THE TELEPHONE       Multiple values from one day are sorted in reverse-chronological order         8/24/2024   Driving Risk Screening   Patient/family members have concerns about driving No            8/24/2024   General Alertness/Fatigue Screening   Have you been more tired than usual lately? No            8/24/2024   Urinary Incontinence Screening   Bothered by leaking urine in past 6 months Yes            8/24/2024   TB Screening   Were you born outside of the [place]? No            Today's PHQ-2 Score:       8/28/2024     2:42 PM   PHQ-2 ( 1999 [employer])   Q1: Little interest or pleasure in doing things 0   Q2: Feeling down, depressed or hopeless 0   PHQ-2 Score 0    Q1: Little interest or pleasure in doing things Not at all   Q2: Feeling down, depressed or hopeless Not at all   PHQ-2 Score 0           2024   Substance Use   Alcohol more than 3/day or more than 7/wk No   Do you have a current opioid prescription? No   How severe/bad is pain from 1 to 10? 5/10   Do you use any other substances recreationally? No        Social History     Tobacco Use    Smoking status: Former     Current packs/day: 0.00     Types: Cigarettes     Quit date: 2004     Years since quittin.8    Smokeless tobacco: Never   Vaping Use    Vaping status: Never Used   Substance Use Topics    Alcohol use: No    Drug use: No       ASCVD Risk   The 10-year ASCVD risk score (Yoel DK, et al., 2019) is: 28.1%    Values used to calculate the score:      Age: 69 years      Sex: Male      Is Non- : No      Diabetic: Yes      Tobacco smoker: No      Systolic Blood Pressure: 122 mmHg      Is BP treated: No      HDL Cholesterol: 59 mg/dL      Total Cholesterol: 175 mg/dL      Reviewed and updated as needed this visit by Provider                      Current providers sharing in care for this patient include:  Patient Care Team:  Ashley Akhtar APRN CNP as PCP - General (Family Medicine)  Ashley Akhtar APRN CNP as Assigned PCP  Erin Lara AuD as Audiologist (Audiology)  Diane Myles RD as Diabetes Educator (Dietitian, Registered)  Jahaira Haas MD as Physician (Neurology)  Jahaira Haas MD as Assigned Neuroscience Provider  Ange Randhawa PA-C as Physician Assistant (Urology)  Ange Randhawa PA-C as Physician Assistant (Urology)  Alanna Paredes PT as Physical Therapist (Physical Therapy)  August Betancourt MD as Assigned Surgical Provider    The following health maintenance items are reviewed in Epic and correct as of today:  Health Maintenance   Topic Date Due    COVID-19 Vaccine ( season) 09/15/2023     "EYE EXAM  10/18/2023    ZOSTER IMMUNIZATION (2 of 2) 09/15/2023    MEDICARE ANNUAL WELLNESS VISIT  09/01/2024    ANNUAL REVIEW OF HM ORDERS  09/01/2024    INFLUENZA VACCINE (1) 09/01/2024    A1C  11/26/2024    LIPID  11/27/2024    MICROALBUMIN  12/06/2024    DIABETIC FOOT EXAM  12/06/2024    BMP  04/11/2025    FALL RISK ASSESSMENT  08/29/2025    DTAP/TDAP/TD IMMUNIZATION (2 - Td or Tdap) 07/30/2028    ADVANCE CARE PLANNING  08/29/2029    COLORECTAL CANCER SCREENING  08/01/2034    HEPATITIS C SCREENING  Completed    PHQ-2 (once per calendar year)  Completed    Pneumococcal Vaccine: 65+ Years  Completed    RSV VACCINE  Completed    AORTIC ANEURYSM SCREENING (SYSTEM ASSIGNED)  Completed    HPV IMMUNIZATION  Aged Out    MENINGITIS IMMUNIZATION  Aged Out    RSV MONOCLONAL ANTIBODY  Aged Out         Review of Systems  Constitutional, HEENT, cardiovascular, pulmonary, gi and gu systems are negative, except as otherwise noted.     Objective    Exam  /82   Pulse 75   Temp 97.5  F (36.4  C) (Tympanic)   Resp 16   Ht 1.702 m (5' 7\")   Wt 78.2 kg (172 lb 6.4 oz)   SpO2 98%   BMI 27.00 kg/m     Estimated body mass index is 27 kg/m  as calculated from the following:    Height as of this encounter: 1.702 m (5' 7\").    Weight as of this encounter: 78.2 kg (172 lb 6.4 oz).    Physical Exam  GENERAL: alert and no distress  EYES: Eyes grossly normal to inspection, PERRL and conjunctivae and sclerae normal  HENT: ear canals and TM's normal, nose and mouth without ulcers or lesions  NECK: no adenopathy, no asymmetry, masses, or scars  RESP: lungs clear to auscultation - no rales, rhonchi or wheezes  CV: regular rate and rhythm, normal S1 S2, no S3 or S4, no murmur, click or rub, no peripheral edema   MS: no gross musculoskeletal defects noted, no edema  SKIN: no suspicious lesions or rashes  NEURO: mentation intact  PSYCH: mentation appears normal, affect normal/bright         8/29/2024   Mini Cog   Clock Draw Score 0 " Abnormal   3 Item Recall 2 objects recalled   Mini Cog Total Score 2            Signed Electronically by: FRANKO Kingston CNP

## 2024-08-30 LAB — VIT D+METAB SERPL-MCNC: 35 NG/ML (ref 20–50)

## 2024-09-01 DIAGNOSIS — T38.0X5A STEROID-INDUCED DIABETES MELLITUS, INITIAL ENCOUNTER (H): ICD-10-CM

## 2024-09-01 DIAGNOSIS — E09.9 STEROID-INDUCED DIABETES MELLITUS, INITIAL ENCOUNTER (H): ICD-10-CM

## 2024-09-04 ENCOUNTER — OFFICE VISIT (OUTPATIENT)
Dept: UROLOGY | Facility: CLINIC | Age: 69
End: 2024-09-04
Payer: MEDICARE

## 2024-09-04 VITALS — SYSTOLIC BLOOD PRESSURE: 144 MMHG | RESPIRATION RATE: 16 BRPM | HEART RATE: 78 BPM | DIASTOLIC BLOOD PRESSURE: 78 MMHG

## 2024-09-04 DIAGNOSIS — N32.81 OVERACTIVE BLADDER: Primary | ICD-10-CM

## 2024-09-04 PROCEDURE — 99214 OFFICE O/P EST MOD 30 MIN: CPT | Performed by: STUDENT IN AN ORGANIZED HEALTH CARE EDUCATION/TRAINING PROGRAM

## 2024-09-04 RX ORDER — MIRABEGRON 50 MG/1
50 TABLET, EXTENDED RELEASE ORAL DAILY
Qty: 30 TABLET | Refills: 3 | Status: SHIPPED | OUTPATIENT
Start: 2024-09-04 | End: 2024-09-09

## 2024-09-04 RX ORDER — METFORMIN HCL 500 MG
500 TABLET, EXTENDED RELEASE 24 HR ORAL
Qty: 90 TABLET | Refills: 0 | Status: SHIPPED | OUTPATIENT
Start: 2024-09-04

## 2024-09-04 NOTE — TELEPHONE ENCOUNTER
Ashley, I talked to the patient on the phone and he wants a different Neuromuscular Specialist other than the Dr Haas that he saw.  But he does want this Specialist in the [facility] system.  See My Chart messages.   Betsey had a hard time getting to the point of what patient is asking for.

## 2024-09-04 NOTE — PROGRESS NOTES
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     7/2/2024  He is here for urinary frequency  He voids around every 45 minutes during daytime and about 3 times at night time  It started about 2 years ago and seems to have worsened  He tried tamsulosin 0.4 mg daily and he didn't notice any improvement  +urgency but some urge incontinence  Given up caffeine  Drinks 1.5 liters  Has Crohn's disease, has loose stools.   PVR 12 ml  We recommended conservative measured with modifying fluid intake, avoiding bladder irritants, and urge supression techniques     Today  He reports things gotten better and it was the peak in 27th.   Then there was decline  Last few days every 45 minutes  The two things that bother him are the frequency and urge incontinence      Medical Comorbidities      Past Medical History:   Diagnosis Date    Arthritis 04/05/2023    Rheumatoid factor confirmed    Cancer (H) 09/2923    Prostate    Crohn disease (H)     Diabetes (H) 08/21/2023    Herpes zoster     Created by Conversion  Replacement Utility updated for latest IMO load    Postherpetic polyneuropathy     Created by Conversion                Medications     Current Outpatient Medications   Medication Sig Dispense Refill    abiraterone (ZYTIGA) 250 MG tablet Take 250 mg by mouth daily Taking 2 250mg daily      acetaminophen (TYLENOL) 500 MG tablet  Acetaminophen Oral  PO  PRN May take up to 650 mg, for arthralgias   active (Patient not taking: Reported on 7/2/2024)      blood glucose (NO BRAND SPECIFIED) test strip Use to test blood sugar one time daily or as directed. To accompany: Blood Glucose Monitor Brands: per insurance. 100 strip 6    blood glucose monitoring (NO BRAND SPECIFIED) meter device kit Use to test blood sugar one time daily or as directed. Preferred blood glucose meter OR supplies to accompany: Blood Glucose Monitor Brands: per insurance. 1 kit 0    cholestyramine light (QUESTRAN) 4 GM packet daily      famotidine (PEPCID) 20 MG tablet Take 20 mg by mouth 2 times daily      leuprolide (LUPRON DEPOT) 22.5 MG kit Inject 22.5 mg into the muscle every 3 months      metFORMIN (GLUCOPHAGE XR) 500 MG 24 hr tablet TAKE 1 TABLET(500 MG) BY MOUTH DAILY WITH DINNER 90 tablet 1    multivitamin (CENTRUM SILVER) tablet Take 1 tablet by mouth 2 times daily      olaparib (LYNPARZA) 150 MG tablet Take 2 tablets (300 mg) by mouth 2 times daily      predniSONE (DELTASONE) 5 MG tablet Take 1 tablet (5 mg) by mouth daily      pregabalin (LYRICA) 25 MG capsule TAKE 1 CAPSULE(25 MG) BY MOUTH TWICE DAILY 60 capsule 2    thin (NO BRAND SPECIFIED) lancets Use with lanceting device. To accompany: Blood Glucose Monitor Brands: per insurance. 100 each 6    UNABLE TO FIND every 6 months MEDICATION NAME: Melissa       No current facility-administered medications for this visit.       BP (!) 144/78 (BP Location: Left arm, Patient Position: Chair, Cuff Size: Adult Regular)   Pulse 78   Resp 16            Assessment/Plan   69 year old year old person with stage Ivb (T2N1M1) metastatic hormone sensitive prostate adenocarcinoma with bone and diffuse antionette metastasis    1.  OAB with urgency and urge incontinence - We tried conservative measures with pelvic floor physical therapy and modify fluid intake and take the pain he did have some improvement but back to baseline now.   At this point we will try pharmacotherapy and we will initiate a beta 3 agonist mirabegron 50 mg daily.  I did discuss with him the side effects of hypertension and to monitor for a     plan to see him back in around 6 weeks to reassess symptoms    CC:  Ashley Akhtar    Additional Coding Information:    Problems:  4 -- one or more chronic illnesses with exacerbation or side effects    Level of risk:  4 -- prescription drug management

## 2024-09-04 NOTE — NURSING NOTE
"Initial BP (!) 144/78 (BP Location: Left arm, Patient Position: Chair, Cuff Size: Adult Regular)   Pulse 78   Resp 16  Estimated body mass index is 27 kg/m  as calculated from the following:    Height as of 8/29/24: 1.702 m (5' 7\").    Weight as of 8/29/24: 78.2 kg (172 lb 6.4 oz). .  Patient is here for a recheck. Patient states only slightly better since he started therapy.  richard jenkins LPN    "

## 2024-09-05 ENCOUNTER — MYC MEDICAL ADVICE (OUTPATIENT)
Dept: UROLOGY | Facility: CLINIC | Age: 69
End: 2024-09-05
Payer: MEDICARE

## 2024-09-05 NOTE — TELEPHONE ENCOUNTER
I do not know any neurologist outside of [place]. I can put referral to [facility], or [facility] of neurology. [facility]

## 2024-09-06 NOTE — TELEPHONE ENCOUNTER
Patient is stating that he would not like to see Dr. Haas feels their was extreme personality differences. Would like to speak with someone about changing as he does not want to continue his care with her.

## 2024-09-07 DIAGNOSIS — G62.9 PERIPHERAL POLYNEUROPATHY: ICD-10-CM

## 2024-09-09 ENCOUNTER — TELEPHONE (OUTPATIENT)
Dept: SURGERY | Facility: CLINIC | Age: 69
End: 2024-09-09
Payer: MEDICARE

## 2024-09-09 ENCOUNTER — TELEPHONE (OUTPATIENT)
Dept: NEUROLOGY | Facility: CLINIC | Age: 69
End: 2024-09-09
Payer: MEDICARE

## 2024-09-09 DIAGNOSIS — N32.81 OVERACTIVE BLADDER: Primary | ICD-10-CM

## 2024-09-09 RX ORDER — PREGABALIN 25 MG/1
CAPSULE ORAL
Qty: 60 CAPSULE | Refills: 2 | Status: SHIPPED | OUTPATIENT
Start: 2024-09-09

## 2024-09-09 NOTE — TELEPHONE ENCOUNTER
Patient confirmed scheduled appointment:  Date: 12/18  Time: 11 am   Visit type: New Neurology   Provider: Marta   Location: [facility] - pt aware refer   Testing/imaging: n/a  Additional notes:   Please schedule pt with Dr Lozano at either location-first available as New Neurology.   Dr Haas has agreed to transfer his care to Dr Lozano at patient request for a different neuromuscular provider. Dr Lozano has agreed to the switch. Per Lety Sanchez on 9/9/2024 at 9:12 AM

## 2024-09-09 NOTE — TELEPHONE ENCOUNTER
Please see mycharts and advise.   Thank you  Saray WRIGHT RN BSN PHN  Specialty Clinics     Start alfuzosin    Check PSA    Return for cystoscopy    Patient Education     Cystoscopy    Cystoscopy is a procedure that lets your healthcare provider look directly inside your urethra and bladder. It can be used to:     Help diagnose a problem with your urethra, bladder, or kidneys.    Take a sample (biopsy) of bladder or urethral tissue.    Treat certain problems (such as removing kidney stones).    Place a tube (stent) to bypass a blockage.    Take special X-rays of the kidneys.  Based on the findings, your provider may advise other tests or treatments.   What is a cystoscope?  A cystoscope is a telescope-like tube that contains lenses and small glass wires that make bright light (fiberoptics). The cystoscope may be straight and rigid. Or it may be flexible to bend around curves in the urethra. The healthcare provider may look directly into the cystoscope, or project the image onto a screen.   Getting ready    Ask your healthcare provider if you should stop taking any medicines before the procedure.    Follow any directions you are given for not eating or drinking before the procedure.    Follow any other instructions your healthcare provider gives you.    Tell your healthcare provider before the exam if you:     Take any medicines, such as aspirin or blood thinners. This also includes any over-the-counter and prescription medicines, vitamins, herbs, and other supplements.    Have allergies to any medicines    Are pregnant or think you may be pregnant    During the procedure  Cystoscopy is done in the healthcare provider s office, surgery center, or hospital. The doctor and a nurse are present during the procedure. It takes only a few minutes. It takes longer if a biopsy, X-ray, or treatment needs to be done.   During the procedure:    You lie on an exam table on your back, knees bent and legs apart. You are covered with a drape.    Your urethra and the area around it are washed. Anesthetic jelly may be  applied to numb the urethra. Other pain medicine is often not needed. In some cases, you may be offered a mild sedative to help you relax. If a more extensive procedure is to be done, such as a biopsy or kidney stone removal, general anesthesia may be needed. Often a one-time antibiotic is given.    The cystoscope is inserted. A sterile fluid is put into the bladder to expand it. You may feel pressure from this fluid.    When the procedure is done, the cystoscope is removed.  After the procedure  If you had a sedative, general anesthesia, or spinal anesthesia, you must have someone drive you home. Once you re home:     Drink plenty of fluids.    You may have burning or light bleeding when you urinate. This is normal.    Medicines may be prescribed to ease any mild pain or prevent infection. Take these as directed.    Call your healthcare provider if you have heavy bleeding or blood clots, burning that lasts more than a day, a fever over  100  F  ( 38 C ), or trouble urinating.  [employer] last reviewed this educational content on 10/1/2019    4568-1892 [employer]. All rights reserved. This information is not intended as a substitute for professional medical care. Always follow your healthcare professional's instructions.

## 2024-09-11 ENCOUNTER — THERAPY VISIT (OUTPATIENT)
Dept: PHYSICAL THERAPY | Facility: CLINIC | Age: 69
End: 2024-09-11
Attending: STUDENT IN AN ORGANIZED HEALTH CARE EDUCATION/TRAINING PROGRAM
Payer: MEDICARE

## 2024-09-11 DIAGNOSIS — N32.81 OVERACTIVE BLADDER: Primary | ICD-10-CM

## 2024-09-11 PROCEDURE — 97535 SELF CARE MNGMENT TRAINING: CPT | Mod: GP,KX | Performed by: PHYSICAL THERAPIST

## 2024-09-11 PROCEDURE — 90901 BIOFEEDBACK TRAIN ANY METH: CPT | Mod: GP,KX | Performed by: PHYSICAL THERAPIST

## 2024-09-11 PROCEDURE — 97110 THERAPEUTIC EXERCISES: CPT | Mod: GP,KX | Performed by: PHYSICAL THERAPIST

## 2024-09-22 DIAGNOSIS — E11.9 TYPE 2 DIABETES MELLITUS WITHOUT COMPLICATION, WITHOUT LONG-TERM CURRENT USE OF INSULIN (H): ICD-10-CM

## 2024-09-23 RX ORDER — BLOOD SUGAR DIAGNOSTIC
STRIP MISCELLANEOUS
Qty: 100 STRIP | Refills: 0 | Status: SHIPPED | OUTPATIENT
Start: 2024-09-23 | End: 2024-09-25

## 2024-09-25 DIAGNOSIS — E11.9 TYPE 2 DIABETES MELLITUS WITHOUT COMPLICATION, WITHOUT LONG-TERM CURRENT USE OF INSULIN (H): ICD-10-CM

## 2024-09-25 RX ORDER — BLOOD SUGAR DIAGNOSTIC
STRIP MISCELLANEOUS
Qty: 100 STRIP | Refills: 1 | Status: SHIPPED | OUTPATIENT
Start: 2024-09-25

## 2024-09-25 NOTE — TELEPHONE ENCOUNTER
Prescription approved per [facility] Refill Protocol.  Julie Behrendt RN

## 2024-10-08 ENCOUNTER — THERAPY VISIT (OUTPATIENT)
Dept: PHYSICAL THERAPY | Facility: CLINIC | Age: 69
End: 2024-10-08
Attending: STUDENT IN AN ORGANIZED HEALTH CARE EDUCATION/TRAINING PROGRAM
Payer: MEDICARE

## 2024-10-08 DIAGNOSIS — N32.81 OVERACTIVE BLADDER: Primary | ICD-10-CM

## 2024-10-08 PROCEDURE — 97110 THERAPEUTIC EXERCISES: CPT | Mod: GP | Performed by: PHYSICAL THERAPIST

## 2024-10-08 PROCEDURE — 90912 BFB TRAINING 1ST 15 MIN: CPT | Mod: GP | Performed by: PHYSICAL THERAPIST

## 2024-10-08 PROCEDURE — 97530 THERAPEUTIC ACTIVITIES: CPT | Mod: GP | Performed by: PHYSICAL THERAPIST

## 2024-10-08 PROCEDURE — 90913 BFB TRAINING EA ADDL 15 MIN: CPT | Mod: GP | Performed by: PHYSICAL THERAPIST

## 2024-10-23 ENCOUNTER — OFFICE VISIT (OUTPATIENT)
Dept: UROLOGY | Facility: CLINIC | Age: 69
End: 2024-10-23
Payer: MEDICARE

## 2024-10-23 VITALS
WEIGHT: 170 LBS | SYSTOLIC BLOOD PRESSURE: 149 MMHG | OXYGEN SATURATION: 99 % | BODY MASS INDEX: 26.68 KG/M2 | HEART RATE: 83 BPM | HEIGHT: 67 IN | DIASTOLIC BLOOD PRESSURE: 77 MMHG

## 2024-10-23 DIAGNOSIS — R31.0 GROSS HEMATURIA: ICD-10-CM

## 2024-10-23 DIAGNOSIS — N32.81 OVERACTIVE BLADDER: Primary | ICD-10-CM

## 2024-10-23 PROCEDURE — 52000 CYSTOURETHROSCOPY: CPT | Performed by: STUDENT IN AN ORGANIZED HEALTH CARE EDUCATION/TRAINING PROGRAM

## 2024-10-23 NOTE — NURSING NOTE
Chief Complaint   Patient presents with    Cystoscopy     Cysto       There were no vitals filed for this visit.  Wt Readings from Last 1 Encounters:   08/29/24 78.2 kg (172 lb 6.4 oz)     Meghan Snyder MA

## 2024-10-23 NOTE — PROGRESS NOTES
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      7/2/2024  He is here for urinary frequency  He voids around every 45 minutes during daytime and about 3 times at night time  It started about 2 years ago and seems to have worsened  He tried tamsulosin 0.4 mg daily and he didn't notice any improvement  +urgency but some urge incontinence  Given up caffeine  Drinks 1.5 liters  Has Crohn's disease, has loose stools.   PVR 12 ml  We recommended conservative measured with modifying fluid intake, avoiding bladder irritants, and urge supression techniques      9/4/2024  He reports things gotten better and it was the peak in 27th.   Then there was decline  Last few days every 45 minutes  The two things that bother him are the frequency and urge incontinence  Started on Gemtesa      10/23/2024 today reports 50% improvement in symptoms with Gemtesa  Added cysto because outside imaging showed bladder diverticuli  Reported episode of hematuria but has resolved.    CYSTOSCOPY  We discussed the risks and benefits of the procedure which include risk of bleeding and infection.  Informed consent was obtained, the patient was prepped and draped in the standard sterile fashion.  A flexible cystoscope was introduced through a well-lubricated urethra.     Anterior urethra strictures were absent.   The urinary sphincter was weak.  The prostate demonstrated no hypertrophy.  Bladder neck was open.   Bladder signififcant for the following:    There was evidence of increased vascularity radiation cystitis changes on the lateral wall.       Diverticuli: Yes      Cellules: No      Trabeculation: mild       Tumors: No      Stones:No  The ureteral orifices  were identified on each side in orthotopic position  On retroflexion there was the usual bladder neck hyperemia.  There no intravesical protrusion of the prostate.      The flexible cystoscope was removed and the findings were described to the patient.     Assessment and Plan  68 yo male with LUTS, hematuria    Hematuria - cysto with evidence of radiation cystitis changes, likely source of the transient hematuria - his surveillance imaging doesn't show any renal abnormalities.  Diverticuli - likely from prior to treatment of prostate cancer - no signs of obstruction now  OAB - 50% improvement with Gemtesa so will continue this - follow-up 6 months virtually to monitor symptoms

## 2024-11-04 DIAGNOSIS — E11.9 TYPE 2 DIABETES MELLITUS WITHOUT COMPLICATION, WITHOUT LONG-TERM CURRENT USE OF INSULIN (H): ICD-10-CM

## 2024-11-04 RX ORDER — LANCETS
EACH MISCELLANEOUS
Qty: 100 EACH | Refills: 0 | Status: SHIPPED | OUTPATIENT
Start: 2024-11-04

## 2024-11-06 ENCOUNTER — THERAPY VISIT (OUTPATIENT)
Dept: PHYSICAL THERAPY | Facility: CLINIC | Age: 69
End: 2024-11-06
Attending: STUDENT IN AN ORGANIZED HEALTH CARE EDUCATION/TRAINING PROGRAM
Payer: MEDICARE

## 2024-11-06 DIAGNOSIS — N32.81 OVERACTIVE BLADDER: Primary | ICD-10-CM

## 2024-11-06 PROCEDURE — 90913 BFB TRAINING EA ADDL 15 MIN: CPT | Mod: GP,KX | Performed by: PHYSICAL THERAPIST

## 2024-11-06 PROCEDURE — 97535 SELF CARE MNGMENT TRAINING: CPT | Mod: GP,KX | Performed by: PHYSICAL THERAPIST

## 2024-11-06 PROCEDURE — 97110 THERAPEUTIC EXERCISES: CPT | Mod: GP,KX | Performed by: PHYSICAL THERAPIST

## 2024-11-06 PROCEDURE — 90912 BFB TRAINING 1ST 15 MIN: CPT | Mod: GP,KX | Performed by: PHYSICAL THERAPIST

## 2024-11-08 ENCOUNTER — TELEPHONE (OUTPATIENT)
Dept: FAMILY MEDICINE | Facility: CLINIC | Age: 69
End: 2024-11-08
Payer: MEDICARE

## 2024-11-08 NOTE — TELEPHONE ENCOUNTER
[facility] diabetic supply order form received, prepared, and routed to [facility]

## 2024-11-11 NOTE — TELEPHONE ENCOUNTER
Form completed, signed, and faxed to [facility] at [phone]. Copy sent to scan and copy placed in cabinet.

## 2024-11-20 ENCOUNTER — TRANSFERRED RECORDS (OUTPATIENT)
Dept: HEALTH INFORMATION MANAGEMENT | Facility: CLINIC | Age: 69
End: 2024-11-20
Payer: MEDICARE

## 2024-11-20 LAB
ALT SERPL-CCNC: 14 U/L (ref 9–46)
AST SERPL-CCNC: 19 U/L (ref 10–35)

## 2024-12-09 DIAGNOSIS — G62.9 PERIPHERAL POLYNEUROPATHY: ICD-10-CM

## 2024-12-09 RX ORDER — PREGABALIN 25 MG/1
CAPSULE ORAL
Qty: 60 CAPSULE | OUTPATIENT
Start: 2024-12-09

## 2024-12-09 RX ORDER — PREGABALIN 25 MG/1
CAPSULE ORAL
Qty: 60 CAPSULE | Refills: 3 | Status: SHIPPED | OUTPATIENT
Start: 2024-12-09

## 2024-12-18 ENCOUNTER — OFFICE VISIT (OUTPATIENT)
Dept: NEUROLOGY | Facility: CLINIC | Age: 69
End: 2024-12-18
Payer: MEDICARE

## 2024-12-18 VITALS
HEIGHT: 67 IN | BODY MASS INDEX: 26.21 KG/M2 | HEART RATE: 84 BPM | DIASTOLIC BLOOD PRESSURE: 79 MMHG | SYSTOLIC BLOOD PRESSURE: 160 MMHG | OXYGEN SATURATION: 99 % | WEIGHT: 167 LBS

## 2024-12-18 DIAGNOSIS — M21.371 RIGHT FOOT DROP: ICD-10-CM

## 2024-12-18 DIAGNOSIS — G54.1 RADIATION-INDUCED LUMBOSACRAL PLEXOPATHY: Primary | ICD-10-CM

## 2024-12-18 DIAGNOSIS — R29.898 BILATERAL LEG WEAKNESS: ICD-10-CM

## 2024-12-18 DIAGNOSIS — R26.81 GAIT INSTABILITY: ICD-10-CM

## 2024-12-18 NOTE — NURSING NOTE
"Se Quinonez is a 69 year old male who presents for:  Chief Complaint   Patient presents with    Lumbosacral Plexopathy     Dr Haas has agreed to transfer his care to Dr Lozano      Recheck BP: 160/79      Initial Vitals:  BP (!) 169/84   Pulse 84   Ht 1.702 m (5' 7\")   Wt 75.8 kg (167 lb)   SpO2 99%   BMI 26.16 kg/m   Estimated body mass index is 26.16 kg/m  as calculated from the following:    Height as of this encounter: 1.702 m (5' 7\").    Weight as of this encounter: 75.8 kg (167 lb).. Body surface area is 1.89 meters squared. BP completed using cuff size: dorothy Almanza CMA    "

## 2024-12-18 NOTE — PATIENT INSTRUCTIONS
I agree with the diagnosis of lumbosacral plexopathy and I think the most likely culprit is radiation.  Radiation-induced plexopathy can behave in 2 different ways.  Patients with symptoms very shortly after initiation of radiation therapy (within the first few months, like you), tend to often have a monophasic course with rapid worsening of their symptoms followed by stabilization or gradual improvement.  Patients with symptoms developing many months or years after radiation therapy tend to gradually worsen over the years without improvement.   Ultimately, we will need to keep an eye on you and examine you a few times in the next year(s), to see how this will change.    I would like you to get a blood test called neurofilament light chain.  This gives us an idea of how active an axonal nerve injury is.  If the levels are pretty low, then this is rather inactive, and we do not need to take any additional action.  If the levels are very high, the process may be active, and in that case I would discuss the option of a nerve biopsy.

## 2024-12-18 NOTE — PROGRESS NOTES
FRANKO Goldman CNP (PCP)  Jahaira Haas MD ([facility])    [place], December 18, 2024    I had the pleasure to see Mr. Quinonez at the [facility] neuromuscular clinic in [place] today.  He is here for second opinion.  He was previously evaluated and followed by my colleague Dr. Haas at [facility] neuromuscular clinic and was given a diagnosis of lumbosacral plexopathy most likely radiation-induced.    His history is as follows: He has a history of high risk metastatic hormone sensitive prostate cancer with bone and diffuse antionette metastasis for whichshe received chemo hormonal treatment including docetaxel, which was completed in January 2023, then radiation therapy between March and April 2023, and subsequently androgen deprivation therapy plus Zytiga and prednisone    He first noted some numbness and tingling in his feet when he was getting the chemotherapy that were relatively mild.  Around May 2023 the sensory symptoms in his feet and lower legs became worse especially on the right side, and  then he noted bilateral ankle weakness worse on the right, and foot drop.  He was not complaining of any back or radiating pain.  Neurological examination showed asymmetric distal more than proximal right more than left leg weakness and sensory loss. He did have an EMG study on January 16, 2024 that showed denervation at the right L3-S1 and left L5-S1 distribution which could be consistent with lumbosacral plexopathy.  I reviewed the study and I agree with the report. Myokymia was not observed.    Subsequent studies included an MRI of the lumbar spine without contrast done in February 2024. There was no high-grade neuroforaminal or spinal canal stenosis or signs of cauda equina except for the L3-L4 where the neuroforaminal stenosis was deemed moderate to severe.  However, neuroforaminal stenosis at this level could not explain his symptoms or EMG abnormalities.  Lumbosacral plexus  MRI done in November 2023 with contrast showed no evidence of mass or abnormal enhancement.      He had a lumbar puncture in April 2024 that was largely normal.  CSF protein was 49 mg per DL, which is normal for 69-year-old.  Glucose was normal cryptococcal antigen, CMV PCR were negative.  Nucleated cell count was only 1 CSF angiotensin-converting enzyme was negative.  Oligoclonal bands were not done.    Additional labs included serum protein immunofixation done in November 2023 that was negative for monoclonal protein, and so was electrophoresis.  Autoimmune/paraneoplastic axonal neuropathy antibodies done through [facility] were negative.  CRP was 7.07.  Rheumatoid factor, SSA, SSB, and AMAURY antibodies were negative.  TSH was 1.117, within normal limits.  Recent B12 levels were normal at 768    Of note he had one hemoglobin A1c value consistent with diabetes in August 2023 (6.6%). Curiously, all hemoglobin A1c values in 2022 on 2024 were normal and below 6.    Dr. Haas discussed the possibility of a sural nerve biopsy with him to rule out an inflammatory/micro vasculitic cause of his plexopathy, but this wasn't done.    The patient had some concerns about MS initially.  Importantly, in the last 6 months he has clearly improved.  At the venice of the disease he had to use a cane regularly, and occasionally a walker.  Now he does not use the cane or walker anymore, as of June 2024.  He was also prescribed a right ankle-foot orthosis that he found a little uncomfortable, and he stopped using it about 3 months ago.  In the last few months he has fallen a couple of times.  However, overall his balance is probably better than it was at the beginning of 2024.  He used to have neuropathic pain at the top of the right foot but this is no more the case.  He denies any weakness of the hands although occasionally they are a little clumsy.  He has minor numbness at the fingertips on both sides, since the  "chemotherapy.    Past Medical History:   Diagnosis Date    Arthritis 04/05/2023    Rheumatoid factor confirmed    Cancer (H) 09/2923    Prostate    Crohn disease (H)     Diabetes (H) 08/21/2023    Herpes zoster     Created by Conversion  Replacement Utility updated for latest IMO load    Postherpetic polyneuropathy     Created by Conversion        Current Outpatient Medications   Medication Sig Dispense Refill    abiraterone (ZYTIGA) 250 MG tablet Take 250 mg by mouth daily Taking 2 250mg daily      acetaminophen (TYLENOL) 500 MG tablet       blood glucose (ACCU-CHEK GUIDE) test strip USE TO TEST BLOOD SUGAR ONCE DAILY OR AS DIRECTED 100 strip 1    blood glucose monitoring (NO BRAND SPECIFIED) meter device kit Use to test blood sugar one time daily or as directed. Preferred blood glucose meter OR supplies to accompany: Blood Glucose Monitor Brands: per insurance. 1 kit 0    blood glucose monitoring (SOFTCLIX) lancets USE WITH LANCETING DEVICE TO TEST ONCE DAILY 100 each 0    cholestyramine light (QUESTRAN) 4 GM packet daily      famotidine (PEPCID) 20 MG tablet Take 20 mg by mouth 2 times daily      leuprolide (LUPRON DEPOT) 22.5 MG kit Inject 22.5 mg into the muscle every 3 months      metFORMIN (GLUCOPHAGE XR) 500 MG 24 hr tablet TAKE 1 TABLET(500 MG) BY MOUTH DAILY WITH DINNER 90 tablet 0    multivitamin (CENTRUM SILVER) tablet Take 1 tablet by mouth 2 times daily      olaparib (LYNPARZA) 150 MG tablet Take 2 tablets (300 mg) by mouth 2 times daily      predniSONE (DELTASONE) 5 MG tablet Take 1 tablet (5 mg) by mouth daily      pregabalin (LYRICA) 25 MG capsule TAKE 1 CAPSULE(25 MG) BY MOUTH TWICE DAILY 60 capsule 3    UNABLE TO FIND every 6 months MEDICATION NAME: Rituxin      vibegron (GEMTESA) 75 MG TABS tablet Take 1 tablet (75 mg) by mouth daily. 30 tablet 3     No current facility-administered medications for this visit.     VITALS: BP (!) 160/79   Pulse 84   Ht 1.702 m (5' 7\")   Wt 75.8 kg (167 lb)   " SpO2 99%   BMI 26.16 kg/m      EXAM: He is awake, alert, oriented x 3.  Strength in the upper extremities is 5/5 in all muscle groups proximally and distally without overt weakness.  At the lower extremities strength exam shows the following (right/left): Hip flexion 4/4, knee extension 5/5, knee flexion 5/5, hip adduction 5/5, hip abduction 4 -/4, foot dorsiflexion 3 -/5, great toe extension 2/4, foot inversion 3/5, foot eversion less than 3/5.  Plantarflexion is normal.  This exam appears improved compared to the last one in neurology clinic.  Tone is normal in the lower extremities.  Reflexes were absent at the ankles, 2+ at the right knee, 3+ at the left knee.  Plantar responses are mute.  Sensory exam showed absent vibration at the toes, and the medial malleoli, 3 seconds at the right kneecap, 0 left, and 11 at the right index finger, 14 left.  Joint position sensation was impaired at both great toes.  Pinprick was impaired at the toes on both sides but on the right there was further reduction of pinprick at the dorsum of the foot on the lateral calf in the distribution of the L5 root or peroneal nerve.  Pinprick perception at the medial calf was equal.  He could get up from a chair without significant difficulty and he could stand on his toes on both sides but she could not stand on his right heel.  Romberg was positive.      IMPRESSION: I told Mr. Quinonez that I agree with the clinical impression of Dr Haas.  I think he has lumbosacral plexopathy based on clinical exam and EMG findings.  MRI of the lumbar spine did not show any pathology to explain the problem.  Given the negative lumbosacral plexus MRI and unremarkable CSF examination there are only 2 possibilities that can explain this presentation: Radiation plexopathy or an inflammatory disorder.  I explained to him that in fact there may be an overlap between the two.  A recent paper from [facility] described two distinct phenotypes of  radiation-induced lumbosacral plexopathy: The one is the classic form, where symptoms develop several months or years after radiation, and is characterized by inexorably progressive leg weakness and numbness over the years.  The second form, occurs much earlier (in the first few months after radiation therapy), and is typically associated with more pain, but also a monophasic course with subsequent improvement, similar to most inflammatory lumbosacral plexopathies.  What is encouraging is that there is both subjective and objective neurological improvement in terms of his balance and muscle strength, compared to the previous visit with Dr. Haas.  This makes me think that he behaves more like the second form of radiation-induced plexopathy which apparently has a better prognosis.    I will check his blood levels of neurofilament light chain today.  This can indicate activity of axonal neuropathy.  If levels are low, I will take no further action and see him again in clinic in 6 months.  If levels are high, I would consider the option of a sural nerve biopsy to evaluate for microvasculitic changes.     All his questions were answered to the best I could.  I reassured the patient that he does not have multiple sclerosis as this disease typically does not start at age 68, and the clinical neurological signs should be completely different if that were the case.    Sincerely,    Randy Lozano MD, FAAN    Total time spent on this encounter today 65 minutes of which 40 face-to-face, 15 in postvisit note dictation, editing, and orders, and at least 10 in previsit chart review.    The longitudinal plan of care for the diagnosis(es)/condition(s) as documented were addressed during this visit. Due to the added complexity in care, I will continue to support Se in the subsequent management and with ongoing continuity of care.

## 2024-12-19 ENCOUNTER — LAB (OUTPATIENT)
Dept: LAB | Facility: CLINIC | Age: 69
End: 2024-12-19
Payer: MEDICARE

## 2024-12-19 DIAGNOSIS — G54.1 RADIATION-INDUCED LUMBOSACRAL PLEXOPATHY: ICD-10-CM

## 2024-12-19 LAB
Lab: NORMAL
PERFORMING LABORATORY: NORMAL
SPECIMEN STATUS: NORMAL
TEST NAME: NORMAL

## 2024-12-24 LAB — MAYO MISC RESULT: NORMAL

## 2024-12-28 ENCOUNTER — HEALTH MAINTENANCE LETTER (OUTPATIENT)
Age: 69
End: 2024-12-28

## 2024-12-30 ENCOUNTER — MYC MEDICAL ADVICE (OUTPATIENT)
Dept: FAMILY MEDICINE | Facility: CLINIC | Age: 69
End: 2024-12-30
Payer: MEDICARE

## 2024-12-30 NOTE — TELEPHONE ENCOUNTER
Although Ashley has a closed practice, would this be someone she would be willing to see?    Goldie Hanson on 12/30/2024 at 2:30 PM

## 2025-01-19 ENCOUNTER — MYC REFILL (OUTPATIENT)
Dept: FAMILY MEDICINE | Facility: CLINIC | Age: 70
End: 2025-01-19
Payer: MEDICARE

## 2025-01-19 DIAGNOSIS — N32.81 OVERACTIVE BLADDER: ICD-10-CM

## 2025-02-09 DIAGNOSIS — E11.9 TYPE 2 DIABETES MELLITUS WITHOUT COMPLICATION, WITHOUT LONG-TERM CURRENT USE OF INSULIN (H): ICD-10-CM

## 2025-02-11 RX ORDER — LANCETS
EACH MISCELLANEOUS
Qty: 100 EACH | Refills: 1 | Status: SHIPPED | OUTPATIENT
Start: 2025-02-11

## 2025-02-12 ENCOUNTER — MYC REFILL (OUTPATIENT)
Dept: FAMILY MEDICINE | Facility: CLINIC | Age: 70
End: 2025-02-12
Payer: MEDICARE

## 2025-02-12 DIAGNOSIS — T38.0X5A STEROID-INDUCED DIABETES MELLITUS, INITIAL ENCOUNTER: ICD-10-CM

## 2025-02-12 DIAGNOSIS — E09.9 STEROID-INDUCED DIABETES MELLITUS, INITIAL ENCOUNTER: ICD-10-CM

## 2025-02-13 RX ORDER — METFORMIN HYDROCHLORIDE 500 MG/1
500 TABLET, EXTENDED RELEASE ORAL
Qty: 90 TABLET | Refills: 0 | Status: SHIPPED | OUTPATIENT
Start: 2025-02-13

## 2025-02-27 ENCOUNTER — THERAPY VISIT (OUTPATIENT)
Dept: PHYSICAL THERAPY | Facility: REHABILITATION | Age: 70
End: 2025-02-27
Attending: PSYCHIATRY & NEUROLOGY
Payer: MEDICARE

## 2025-02-27 DIAGNOSIS — G54.1 RADIATION-INDUCED LUMBOSACRAL PLEXOPATHY: ICD-10-CM

## 2025-02-27 NOTE — PROGRESS NOTES
PHYSICAL THERAPY EVALUATION  Type of Visit: Evaluation       Fall Risk Screen:  Fall screen completed by: PT  Have you fallen 2 or more times in the past year?: Yes  Have you fallen and had an injury in the past year?: No  Is patient a fall risk?: Yes        Subjective         Presenting condition or subjective complaint: prostrate cancer treatment, chemotherapy and radiation, has caused an uneven walking gate    The patient is a 69-year-old male who presents with complaints of gait disturbances and lower extremity weakness following a course of radiation therapy. Post-chemotherapy, he has experienced neuropathy predominantly affecting the right foot and lower limb, with less severe symptoms on the left side. His main concern is an uneven gait and right drop foot, core issues as well. To stay active he currently goes to the  and does a summer sneaker program on Fridays. He is also doing previous PT exercises from a year ago; currently takes up an hour of his day.       Date of onset: 12/30/24    Relevant medical history: Bladder or bowel problems; Cancer; Diabetes; Radiation treatment; Sleep disorder like apnea   Dates & types of surgery:      Prior diagnostic imaging/testing results: MRI; CT scan; EMG     Prior therapy history for the same diagnosis, illness or injury: Yes      Prior Level of Function  Transfers:   Ambulation:   ADL:   IADL:     Living Environment  Social support: With a significant other or spouse   Type of home: [facility]; 2-story   Stairs to enter the home: No       Ramp: No   Stairs inside the home: Yes 13 Is there a railing: Yes     Help at home: None  Equipment owned: Straight Cane; Walker with wheels     Employment: Yes Realtor  Hobbies/Interests: Birding    Patient goals for therapy: improve gate    Pain assessment: Pain denied     Objective      Cognitive Status Examination  Orientation:    Level of Consciousness:   Follows Commands and Answers Questions:   Personal Safety and Judgement:    Memory:     OBSERVATION:   INTEGUMENTARY:   POSTURE: Sitting Posture: Rounded shoulders, Forward head, Thoracic kyphosis increased  PALPATION:   RANGE OF MOTION: LE ROM WFL  UE ROM WFL  STRENGTH:     BED MOBILITY:     TRANSFERS:     WHEELCHAIR MOBILITY:     GAIT:   Level of [place]:   Assistive Device(s):   Gait Deviations:   Gait Distance:   Stairs:     BALANCE:     SPECIAL TESTS  Functional Gait Assessment (FGA)   Next time   10 Meter Walk Test (Comfortable)     10 Meter Walk Test (Fast)     6 Minute Walk Test (6MWT)           Nuno Balance Scale (BBS)     5 Times Sit-to-Stand (5TSTS)  14. 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  The following significant findings have been identified: Pain, Decreased ROM/flexibility, Decreased joint mobility, Decreased strength, Impaired balance, Decreased proprioception, Impaired gait, Impaired muscle performance, Decreased activity tolerance, Impaired posture, and Instability. These impairments interfere with their ability to perform self care tasks, work tasks, recreational activities, household chores, driving , household mobility, and community mobility as compared to previous level of function.     Clinical Decision Making (Complexity):  Clinical Presentation: Stable/Uncomplicated  Clinical Presentation  Rationale: based on medical and personal factors listed in PT evaluation  Clinical Decision Making (Complexity): Low complexity    PLAN OF CARE  Treatment Interventions:  Modalities: Cryotherapy, Hot Pack  Interventions: Gait Training, Manual Therapy, Neuromuscular Re-education, Therapeutic Activity, Therapeutic Exercise, Self-Care/Home Management    Long Term Goals     PT Goal 1  Goal Identifier: HEP  Goal Description: Pt will be IND in HEP and self care management of symptoms  Target Date: 05/22/25  PT Goal 2  Goal Identifier: 5XSTS  Goal Description: Within 12 weeks, the patient will demonstrate the ability to achieve a 5XSTS score of less than 14 seconds compared to their baseline, indicating increased lower extremity strength and overall mobility.  Target Date: 05/22/25  PT Goal 3  Goal Identifier: TUG  Goal Description: Patient's TUG score will decrease by 5 seconds or more to demonstrate improved ability to transfer  Target Date: 05/22/25  PT Goal 4  Goal Identifier: 10 MWT  Goal Description: Improve 10-meter walk test time by 25% within 12 weeks through targeted speed and agility exercises.  Target Date: 05/22/25      Frequency of Treatment: 1x/week  Duration of Treatment: 12x weeks    Recommended Referrals to Other Professionals:   Education Assessment:   Learner/Method: Patient    Risks and benefits of evaluation/treatment have been explained.   Patient/Family/caregiver agrees with Plan of Care.     Evaluation Time:     PT Eval, Low Complexity Minutes (94792): 45       Signing Clinician: Jose Ortiz, PT        [facility]                                                                                   OUTPATIENT PHYSICAL THERAPY      PLAN OF TREATMENT FOR OUTPATIENT REHABILITATION   Patient's Last Name, First Name, Se Monterroso YOB: 1955   Provider's Name   [facility]   Medical Record No.  [id number]      Onset Date: 12/30/24  Start of Care Date: 02/27/25     Medical Diagnosis:  G54.1 (ICD-10-CM) - Radiation-induced lumbosacral plexopathy      PT Treatment Diagnosis:  balance deficits, gait instability, LE weakness Plan of Treatment  Frequency/Duration: 1x/week/ 12x weeks    Certification date from 02/27/25 to 05/22/25         See note for plan of treatment details and functional goals     Jose Ortiz, PT                         I CERTIFY THE NEED FOR THESE SERVICES FURNISHED UNDER        THIS PLAN OF TREATMENT AND WHILE UNDER MY CARE     (Physician attestation of this document indicates review and certification of the therapy plan).              Referring Provider:  Randy Lozano    Initial Assessment  See Epic Evaluation- Start of Care Date: 02/27/25

## 2025-02-28 NOTE — TELEPHONE ENCOUNTER
Infection precautions   Pt requesting a aubrie referral outside [place]. Also requested this from neurology today      Magan Kowalski RN

## 2025-03-04 PROBLEM — N32.81 OVERACTIVE BLADDER: Status: RESOLVED | Noted: 2024-07-10 | Resolved: 2025-03-04

## 2025-03-18 ENCOUNTER — THERAPY VISIT (OUTPATIENT)
Dept: PHYSICAL THERAPY | Facility: REHABILITATION | Age: 70
End: 2025-03-18
Payer: MEDICARE

## 2025-03-18 DIAGNOSIS — R26.81 GAIT INSTABILITY: ICD-10-CM

## 2025-03-18 DIAGNOSIS — G62.0 PERIPHERAL NEUROPATHY DUE TO CHEMOTHERAPY: ICD-10-CM

## 2025-03-18 DIAGNOSIS — R29.898 BILATERAL LEG WEAKNESS: Primary | ICD-10-CM

## 2025-03-18 DIAGNOSIS — G54.1 RADIATION-INDUCED LUMBOSACRAL PLEXOPATHY: ICD-10-CM

## 2025-03-18 DIAGNOSIS — T45.1X5A PERIPHERAL NEUROPATHY DUE TO CHEMOTHERAPY: ICD-10-CM

## 2025-03-18 PROCEDURE — 97110 THERAPEUTIC EXERCISES: CPT | Mod: GP

## 2025-03-18 PROCEDURE — 97112 NEUROMUSCULAR REEDUCATION: CPT | Mod: GP

## 2025-03-27 ENCOUNTER — MEDICAL CORRESPONDENCE (OUTPATIENT)
Dept: HEALTH INFORMATION MANAGEMENT | Facility: CLINIC | Age: 70
End: 2025-03-27
Payer: MEDICARE

## 2025-04-02 ENCOUNTER — MYC MEDICAL ADVICE (OUTPATIENT)
Dept: FAMILY MEDICINE | Facility: CLINIC | Age: 70
End: 2025-04-02
Payer: MEDICARE

## 2025-04-02 DIAGNOSIS — R73.03 PREDIABETES: Primary | ICD-10-CM

## 2025-04-09 ENCOUNTER — LAB (OUTPATIENT)
Dept: LAB | Facility: CLINIC | Age: 70
End: 2025-04-09
Payer: MEDICARE

## 2025-04-09 DIAGNOSIS — R73.03 PREDIABETES: ICD-10-CM

## 2025-04-09 LAB
EST. AVERAGE GLUCOSE BLD GHB EST-MCNC: 94 MG/DL
HBA1C MFR BLD: 4.9 % (ref 0–5.6)

## 2025-04-09 PROCEDURE — 36415 COLL VENOUS BLD VENIPUNCTURE: CPT

## 2025-04-09 PROCEDURE — 83036 HEMOGLOBIN GLYCOSYLATED A1C: CPT

## 2025-04-17 ENCOUNTER — THERAPY VISIT (OUTPATIENT)
Dept: PHYSICAL THERAPY | Facility: REHABILITATION | Age: 70
End: 2025-04-17
Payer: MEDICARE

## 2025-04-17 DIAGNOSIS — R29.898 BILATERAL LEG WEAKNESS: Primary | ICD-10-CM

## 2025-04-17 DIAGNOSIS — T45.1X5A PERIPHERAL NEUROPATHY DUE TO CHEMOTHERAPY: ICD-10-CM

## 2025-04-17 DIAGNOSIS — G54.1 RADIATION-INDUCED LUMBOSACRAL PLEXOPATHY: ICD-10-CM

## 2025-04-17 DIAGNOSIS — G62.0 PERIPHERAL NEUROPATHY DUE TO CHEMOTHERAPY: ICD-10-CM

## 2025-04-17 DIAGNOSIS — R26.81 GAIT INSTABILITY: ICD-10-CM

## 2025-04-17 NOTE — PROGRESS NOTES
DISCHARGE  Reason for Discharge: Patient has met all goals.    Equipment Issued: none    Discharge Plan: Patient to continue home program.    Referring Provider:  Randy Lozano     04/17/25 0500   Appointment Info   Signing clinician's name / credentials Lesly Rose, PT, DPT   Total/Authorized Visits 12   Visits Used 3   Medical Diagnosis G54.1 (ICD-10-CM) - Radiation-induced lumbosacral plexopathy   PT Tx Diagnosis balance deficits, gait instability, LE weakness   Precautions/Limitations falls risk   Progress Note/Certification   Start of Care Date 02/27/25   Onset of illness/injury or Date of Surgery 12/30/24   Therapy Frequency 1x/week   Predicted Duration 12x weeks   Certification date from 02/27/25   Certification date to 05/22/25   Progress Note Completed Date 02/27/25   GOALS   PT Goals 2;3;4   PT Goal 1   Goal Identifier HEP   Goal Description Pt will be IND in HEP and self care management of symptoms   Goal Progress met   Target Date 05/22/25   Date Met 04/17/25   PT Goal 2   Goal Identifier 5XSTS   Goal Description Within 12 weeks, the patient will demonstrate the ability to achieve a 5XSTS score of less than 14 seconds compared to their baseline, indicating increased lower extremity strength and overall mobility.   Goal Progress met   Target Date 05/22/25   Date Met 04/17/25   PT Goal 3   Goal Identifier TUG   Goal Description Patient's TUG score will decrease by 5 seconds or more to demonstrate improved ability to transfer   Goal Progress met   Target Date 05/22/25   Date Met 04/17/25   PT Goal 4   Goal Identifier 10 MWT   Goal Description Improve 10-meter walk test time by 25% within 12 weeks through targeted speed and agility exercises.   Goal Progress met   Target Date 05/22/25   Date Met 04/17/25   Subjective Report   Subjective Report Pt reports has been unable to perform the reverse toe raises, R foot still challenging. Will be following up with neurology soon.  Feels is able to  manage HEP and self activity program well. No recent falls.   Objective Measures   Objective Measures Objective Measure 1;Objective Measure 2;Objective Measure 3;Objective Measure 4   Objective Measure 1   Objective Measure 5XSTS   Details 9.7 sec (4/17/25)   Objective Measure 2   Objective Measure SLS   Details R: 1 sec, L: 3 sec (4/17/25)   Objective Measure 3   Objective Measure TUG   Details 8.8 sec (4/17/25)   Objective Measure 4   Objective Measure 10 MWT   Details 1.69 m/s normal, 2.12 m/s fast   Treatment Interventions (PT)   Interventions Therapeutic Procedure/Exercise;Self Care/Home Management;Manual Therapy;Neuromuscular Re-education;Gait Training   Therapeutic Procedure/Exercise   Therapeutic Procedures: strength, endurance, ROM, flexibility minutes (36288) 8   Ther Proc 1 - Details For improved LE mobility, strength and tissue perfusion - nustep level 5, 7 minutes, total steps 454 with subjective measures taken. TIme spent discussing and updating HEP   Skilled Intervention designed an individualized exercise program based on the patient s assessment of strength, range of motion, and functional limitations. The program includes targeted strengthening, stretching, and functional exercises aimed at improving overall physical function and addressing specific impairments. Initiated HEP, provided patient with verbal, tactile, and visual cues to ensure proper form and technique with exercises   Patient Response/Progress pt tolerated well   Neuromuscular Re-education   Neuromuscular re-ed of mvmt, balance, coord, kinesthetic sense, posture, proprioception minutes (28831) 23   Neuro Re-ed 1 - Details To improve proprioception and intrinsic foot strength - split stance x 1 min B. Split stance with head turns x 1 min B. Tandem stance x 2 min B; discussed head turns for more challenge. Performed in parallel bars: cross over steps x 4, back cross over step x 4, side stepping x 4. Backwards walking x 4.   Skilled  Intervention to promote functional and dynamic balance, created an individualized neuromuscular re-education program tailored to the patient s specific needs, including balance, coordination, and proprioceptive deficits. Selected exercises aimed at improving neuromuscular control and functional movement patterns   Patient Response/Progress pt tolerated well, performed all HEP at handrail for support. Only light touch needed for self correction and more challneged with RLE in forced stance.   Gait Training   Skilled Intervention To enhance overall mobility and function + promoting better gait patterns, encouraging proper heel strike, improving weight distribution, and help strengthen the muscles involved in walking.   Patient Response/Progress pt tolerated well   Manual Therapy   Skilled Intervention to enhance musculoskeletal function through hands-on techniques that improve joint mobility, alleviate pain, and increase tissue flexibility by employing methods such as joint mobilizations, soft tissue manipulation, and myofascial release to ultimately support functional recovery, promote better movement patterns, and contribute to long-term musculoskeletal health by alleviating dysfunction and preventing future injuries.   Patient Response/Progress pt tolerated well   Self Care/home Management   Skilled Intervention Education provided regarding response to exercise, differentiating between pain and soreness, as well as the importance of compliance with HEP.   Eval/Assessments   Assessments Physical Performance Test/Measures   Physical Performance Test/measures   Physical Performance Test/Measurement, Minutes (78872) 10   Physical Performance Test/Measurement Details 5 x STS, TUG, balance, gait speed   Skilled Intervention outcome measures   Patient Response/Progress Discussed outcome measures and resutls, pt improved in all outcome measures (see objective above)   Education   Learner/Method Patient   Plan   Home  program PTRx (phone) - reverse toe raise, step overs   Plan for next session DC   Comments   Comments Pt returns to PT for balance concerns and R foot drop following chemo. Continues to demonstrate weakness R Tib A and plantar flexors, though with outcome measures testing showing much improvemens functionally in global LE strength. Outcome measure improves demonstrating decreased falls risk, pt has good understanding of managment of HEP program and is appropriate to DC to HEP as has met all PT goals.   Total Session Time   Timed Code Treatment Minutes 41   Total Treatment Time (sum of timed and untimed services) 41   ROSLYN BEST, PT

## 2025-04-23 ENCOUNTER — MYC MEDICAL ADVICE (OUTPATIENT)
Dept: UROLOGY | Facility: CLINIC | Age: 70
End: 2025-04-23

## 2025-04-23 NOTE — TELEPHONE ENCOUNTER
Writer checked with Dr Betancourt's MA.   Dr Betancourt is currently online with this patient doing the Virtual Visit.  This MyChart encounter closed.    Kenny SLATER [facility]

## 2025-05-14 DIAGNOSIS — E09.9 STEROID-INDUCED DIABETES MELLITUS, INITIAL ENCOUNTER: ICD-10-CM

## 2025-05-14 DIAGNOSIS — T38.0X5A STEROID-INDUCED DIABETES MELLITUS, INITIAL ENCOUNTER: ICD-10-CM

## 2025-05-14 NOTE — TELEPHONE ENCOUNTER
Requested Prescriptions   Pending Prescriptions Disp Refills    metFORMIN (GLUCOPHAGE XR) 500 MG 24 hr tablet [Pharmacy Med Name: METFORMIN ER 500MG 24HR TABS] 90 tablet 0     Sig: TAKE 1 TABLET(500 MG) BY MOUTH DAILY WITH DINNER       Biguanide Agents Failed - 5/14/2025  1:52 PM        Failed - Recent (6 mo) or future (90 days) visit within the authorizing provider's specialty     The patient must have completed an in-person or virtual visit within the past 6 months or has a future visit scheduled within the next 90 days with the authorizing provider s specialty.  Urgent care and e-visits do not quality as an office visit for this protocol.          Passed - Patient is age 10 or older        Passed - Patient does NOT have a diagnosis of CHF.        Passed - Medication is active on med list and the sig matches. RN to manually verify dose and sig if red X/fail.     If the protocol passes (green check), you do not need to verify med dose and sig.    A prescription matches if they are the same clinical intention.    For Example: once daily and every morning are the same.    The protocol can not identify upper and lower case letters as matching and will fail.     For Example: Take 1 tablet (50 mg) by mouth daily     TAKE 1 TABLET (50 MG) BY MOUTH DAILY    For all fails (red x), verify dose and sig.    If the refill does match what is on file, the RN can still proceed to approve the refill request.       If they do not match, route to the appropriate provider.             Passed - Medication indicated for associated diagnosis     Medication is associated with one or more of the following diagnoses:     Gestational diabetes mellitus     Hyperinsulinar obesity     Hypersecretion of ovarian androgens    Non-alcoholic fatty liver    Polycystic ovarian syndrome               Pre-diabetes (DM 2 prevention)    Type 2 diabetes mellitus     Weight gain, antipsychotic therapy-induced    Impaired fasting glucose          Passed -  Has GFR on file in past 12 months and most recent value is normal          Julie Behrendt RN

## 2025-05-15 RX ORDER — METFORMIN HYDROCHLORIDE 500 MG/1
500 TABLET, EXTENDED RELEASE ORAL
Qty: 90 TABLET | Refills: 0 | Status: SHIPPED | OUTPATIENT
Start: 2025-05-15

## 2025-06-02 ENCOUNTER — MYC MEDICAL ADVICE (OUTPATIENT)
Dept: UROLOGY | Facility: CLINIC | Age: 70
End: 2025-06-02

## 2025-06-02 ENCOUNTER — LAB (OUTPATIENT)
Dept: LAB | Facility: CLINIC | Age: 70
End: 2025-06-02
Payer: MEDICARE

## 2025-06-02 ENCOUNTER — MYC MEDICAL ADVICE (OUTPATIENT)
Dept: FAMILY MEDICINE | Facility: CLINIC | Age: 70
End: 2025-06-02
Payer: MEDICARE

## 2025-06-02 DIAGNOSIS — R31.0 GROSS HEMATURIA: ICD-10-CM

## 2025-06-02 DIAGNOSIS — R35.0 URINARY FREQUENCY: Primary | ICD-10-CM

## 2025-06-02 DIAGNOSIS — R35.0 URINARY FREQUENCY: ICD-10-CM

## 2025-06-02 LAB
ALBUMIN UR-MCNC: NEGATIVE MG/DL
APPEARANCE UR: CLEAR
BILIRUB UR QL STRIP: NEGATIVE
COLOR UR AUTO: YELLOW
GLUCOSE UR STRIP-MCNC: NEGATIVE MG/DL
HGB UR QL STRIP: NEGATIVE
KETONES UR STRIP-MCNC: NEGATIVE MG/DL
LEUKOCYTE ESTERASE UR QL STRIP: NEGATIVE
NITRATE UR QL: NEGATIVE
PH UR STRIP: 6 [PH] (ref 5–7)
RBC #/AREA URNS AUTO: NORMAL /HPF
SP GR UR STRIP: <=1.005 (ref 1–1.03)
UROBILINOGEN UR STRIP-ACNC: 0.2 E.U./DL
WBC #/AREA URNS AUTO: NORMAL /HPF

## 2025-06-02 PROCEDURE — 81001 URINALYSIS AUTO W/SCOPE: CPT

## 2025-06-02 PROCEDURE — 87086 URINE CULTURE/COLONY COUNT: CPT

## 2025-06-03 NOTE — TELEPHONE ENCOUNTER
Noted, thank you. Urine that was submitted yesterday was negative for blood.    FRANKO Kingston CNP

## 2025-06-04 LAB — BACTERIA UR CULT: NORMAL

## 2025-06-05 ENCOUNTER — RESULTS FOLLOW-UP (OUTPATIENT)
Dept: SURGERY | Facility: CLINIC | Age: 70
End: 2025-06-05

## 2025-06-16 DIAGNOSIS — E11.9 TYPE 2 DIABETES MELLITUS WITHOUT COMPLICATION, WITHOUT LONG-TERM CURRENT USE OF INSULIN (H): ICD-10-CM

## 2025-06-17 RX ORDER — BLOOD SUGAR DIAGNOSTIC
STRIP MISCELLANEOUS
Qty: 50 STRIP | Refills: 0 | Status: SHIPPED | OUTPATIENT
Start: 2025-06-17

## 2025-06-18 ENCOUNTER — OFFICE VISIT (OUTPATIENT)
Dept: NEUROLOGY | Facility: CLINIC | Age: 70
End: 2025-06-18
Payer: MEDICARE

## 2025-06-18 VITALS
HEART RATE: 83 BPM | DIASTOLIC BLOOD PRESSURE: 89 MMHG | OXYGEN SATURATION: 99 % | WEIGHT: 167.11 LBS | BODY MASS INDEX: 26.23 KG/M2 | SYSTOLIC BLOOD PRESSURE: 168 MMHG | HEIGHT: 67 IN

## 2025-06-18 DIAGNOSIS — G54.1 RADIATION-INDUCED LUMBOSACRAL PLEXOPATHY: Primary | ICD-10-CM

## 2025-06-18 NOTE — PROGRESS NOTES
FRANKO Gonzalez [facility], June 18, 2025    Dear Dr Akhtar,    I had the pleasure to see Mr. Quinonez in follow-up at the [facility] neuromuscular clinic in [place] today for his suspected radiation-induced lumbosacral plexopathy.  Please see my previous note for details on his presentation, diagnostic tests including MRI of the lumbar spine lumbosacral plexus, CSF, EMG, and labs. Fortunately, the patient reported improvement of his leg weakness in the second half of year 2024 and stopped using a cane and walker as of June 2024.  He also stopped using AFO last year.    He reports some further improvement over the past 6 months.  He still has constant numbness at the top of his feet and difficulty moving his right ankle.  He fell 1 time when she was in an open house.  He still does not like the idea of using the AFO.  He denies neuropathic pain and in fact he stopped taking pregabalin that he was taking last year.      Current Outpatient Medications   Medication Sig Dispense Refill    abiraterone (ZYTIGA) 250 MG tablet Take 250 mg by mouth daily Taking 2 250mg daily      blood glucose (ACCU-CHEK GUIDE TEST) test strip USE TO TEST BLOOD SUGAR ONCE DAILY OR AS DIRECTED 50 strip 0    blood glucose monitoring (NO BRAND SPECIFIED) meter device kit Use to test blood sugar one time daily or as directed. Preferred blood glucose meter OR supplies to accompany: Blood Glucose Monitor Brands: per insurance. 1 kit 0    blood glucose monitoring (SOFTCLIX) lancets USE WITH LANCETING DEVICE TO TEST ONCE DAILY 100 each 1    cholestyramine light (QUESTRAN) 4 GM packet daily      famotidine (PEPCID) 20 MG tablet Take 20 mg by mouth 2 times daily      leuprolide (LUPRON DEPOT) 22.5 MG kit Inject 22.5 mg into the muscle every 3 months      metFORMIN (GLUCOPHAGE XR) 500 MG 24 hr tablet TAKE 1 TABLET(500 MG) BY MOUTH DAILY WITH DINNER 90 tablet 0    multivitamin (CENTRUM SILVER) tablet Take 1 tablet by mouth 2 times  "daily      olaparib (LYNPARZA) 150 MG tablet Take 2 tablets (300 mg) by mouth 2 times daily      predniSONE (DELTASONE) 5 MG tablet Take 1 tablet (5 mg) by mouth daily      vibegron (GEMTESA) 75 MG TABS tablet Take 1 tablet (75 mg) by mouth daily. 30 tablet 3    UNABLE TO FIND every 6 months MEDICATION NAME: Melissa       No current facility-administered medications for this visit.     VITALS: BP (!) 168/89   Pulse 83   Ht 1.702 m (5' 7\")   Wt 75.8 kg (167 lb 1.7 oz)   SpO2 99%   BMI 26.17 kg/m      EXAM: Strength in the lower limbs is 4 for bilateral hip flexion, 4 for right hip abduction, 5 left, 5 for bilateral hip adduction, knee extension, knee flexion.  Foot dorsiflexion is MRC 3 to 4-on the right, and 5 on the left.  Great toe extension is less than antigravity on the right and 4 on the left.  Foot inversion is 3 on the right and 5 on the left.  Ankle jerks are bilaterally absent.  Knee jerks are 3+ and symmetric.  He has no vibration sensation at the toes, but there is some vibration sensation of the medial malleoli of approximately 3 seconds on the right and 5 on the left.  Joint position sensation is absent at the right great toe and slightly impaired yet present on the left.    Comparison to last visit indicates some improvement.  Specifically, the hip abduction is stronger on both sides, the right foot dorsiflexion is slightly improved, and there is some vibration feeling at the medial malleoli that the patient did not have before.    IMPRESSION: I told Mr. 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  He has shown some improvement over the last 6 months, which is encouraging.  That said, I do expect some permanent numbness of the top of his feet, and I am not sure that his ankle dorsiflexion will fully recover.    At this point, no further action is needed.  He will contact me if he has a setback, and in that case I " would remeasure his plasma neurofilament light chain levels and reexamine him.  If there is definite objective worsening, I would consider a sural nerve biopsy to rule out active inflammation.    Follow up as needed.  Thank you for allow me to participate in his care.    Sincerely,    Randy Lozano MD, FAAN    Total time spent on this encounter today 25 minutes of which 10 face-to-face, 10 in postvisit note dictation, editing, and orders, and 5 in previsit chart review.

## 2025-06-18 NOTE — LETTER
6/18/2025      Se Quinonez  [address] [facility] [address]      Dear Colleague,    Thank you for referring your patient, Se Quinonez, to the [facility]. Please see a copy of my visit note below.    FRANKO Gonzalez [facility], June 18, 2025    Dear Dr Akhtar,    I had the pleasure to see Mr. Quinonez in follow-up at the 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  Please see my previous note for details on his presentation, diagnostic tests including MRI of the lumbar spine lumbosacral plexus, CSF, EMG, and labs. Fortunately, the patient reported improvement of his leg weakness in the second half of year 2024 and stopped using a cane and walker as of June 2024.  He also stopped using AFO last year.    He reports some further improvement over the past 6 months.  He still has constant numbness at the top of his feet and difficulty moving his right ankle.  He fell 1 time when she was in an open house.  He still does not like the idea of using the AFO.  He denies neuropathic pain and in fact he stopped taking pregabalin that he was taking last year.      Current Outpatient Medications   Medication Sig Dispense Refill     abiraterone (ZYTIGA) 250 MG tablet Take 250 mg by mouth daily Taking 2 250mg daily       blood glucose (ACCU-CHEK GUIDE TEST) test strip USE TO TEST BLOOD SUGAR ONCE DAILY OR AS DIRECTED 50 strip 0     blood glucose monitoring (NO BRAND SPECIFIED) meter device kit Use to test blood sugar one time daily or as directed. Preferred blood glucose meter OR supplies to accompany: Blood Glucose Monitor Brands: per insurance. 1 kit 0     blood glucose monitoring (SOFTCLIX) lancets USE WITH LANCETING DEVICE TO TEST ONCE DAILY 100 each 1     cholestyramine light (QUESTRAN) 4 GM packet daily       famotidine (PEPCID) 20 MG tablet Take 20 mg by mouth 2 times daily       leuprolide  "(LUPRON DEPOT) 22.5 MG kit Inject 22.5 mg into the muscle every 3 months       metFORMIN (GLUCOPHAGE XR) 500 MG 24 hr tablet TAKE 1 TABLET(500 MG) BY MOUTH DAILY WITH DINNER 90 tablet 0     multivitamin (CENTRUM SILVER) tablet Take 1 tablet by mouth 2 times daily       olaparib (LYNPARZA) 150 MG tablet Take 2 tablets (300 mg) by mouth 2 times daily       predniSONE (DELTASONE) 5 MG tablet Take 1 tablet (5 mg) by mouth daily       vibegron (GEMTESA) 75 MG TABS tablet Take 1 tablet (75 mg) by mouth daily. 30 tablet 3     UNABLE TO FIND every 6 months MEDICATION NAME: Melissa       No current facility-administered medications for this visit.     VITALS: BP (!) 168/89   Pulse 83   Ht 1.702 m (5' 7\")   Wt 75.8 kg (167 lb 1.7 oz)   SpO2 99%   BMI 26.17 kg/m      EXAM: Strength in the lower limbs is 4 for bilateral hip flexion, 4 for right hip abduction, 5 left, 5 for bilateral hip adduction, knee extension, knee flexion.  Foot dorsiflexion is MRC 3 to 4-on the right, and 5 on the left.  Great toe extension is less than antigravity on the right and 4 on the left.  Foot inversion is 3 on the right and 5 on the left.  Ankle jerks are bilaterally absent.  Knee jerks are 3+ and symmetric.  He has no vibration sensation at the toes, but there is some vibration sensation of the medial malleoli of approximately 3 seconds on the right and 5 on the left.  Joint position sensation is absent at the right great toe and slightly impaired yet present on the left.    Comparison to last visit indicates some improvement.  Specifically, the hip abduction is stronger on both sides, the right foot dorsiflexion is slightly improved, and there is some vibration feeling at the medial malleoli that the patient did not have before.    IMPRESSION: I told Mr. 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  He has shown some improvement over the last 6 months, which " is encouraging.  That said, I do expect some permanent numbness of the top of his feet, and I am not sure that his ankle dorsiflexion will fully recover.    At this point, no further action is needed.  He will contact me if he has a setback, and in that case I would remeasure his plasma neurofilament light chain levels and reexamine him.  If there is definite objective worsening, I would consider a sural nerve biopsy to rule out active inflammation.    Follow up as needed.  Thank you for allow me to participate in his care.    Sincerely,    Randy Lozano MD, FAAN    Total time spent on this encounter today 25 minutes of which 10 face-to-face, 10 in postvisit note dictation, editing, and orders, and 5 in previsit chart review.          Again, thank you for allowing me to participate in the care of your patient.        Sincerely,        Randy Lozano MD    Electronically signed

## 2025-06-18 NOTE — NURSING NOTE
"Se Quinonez is a 70 year old male who presents for:  Chief Complaint   Patient presents with    Lumbosacral Plexopathy     Follow up        Initial Vitals:  BP (!) 168/89   Pulse 83   Ht 1.702 m (5' 7\")   Wt 75.8 kg (167 lb 1.7 oz)   SpO2 99%   BMI 26.17 kg/m   Estimated body mass index is 26.17 kg/m  as calculated from the following:    Height as of this encounter: 1.702 m (5' 7\").    Weight as of this encounter: 75.8 kg (167 lb 1.7 oz).. Body surface area is 1.89 meters squared. BP completed using cuff size: aleah Almanza CMA    "

## 2025-06-18 NOTE — PATIENT INSTRUCTIONS
Follow-up as needed.  Please contact me if you think you have a setback.  In that case, happy to see you within 1 to 2 weeks without delays.    I think there will be some permanent numbness at the top of your feet, and I am not sure that your ability to move the right ankle will recover fully, although I remain somewhat cautiously optimistic about it.

## 2025-06-19 DIAGNOSIS — E11.9 TYPE 2 DIABETES MELLITUS WITHOUT COMPLICATION, WITHOUT LONG-TERM CURRENT USE OF INSULIN (H): ICD-10-CM

## 2025-06-19 RX ORDER — BLOOD SUGAR DIAGNOSTIC
STRIP MISCELLANEOUS
Qty: 50 STRIP | Refills: 0 | OUTPATIENT
Start: 2025-06-19

## 2025-07-16 ENCOUNTER — TRANSFERRED RECORDS (OUTPATIENT)
Dept: HEALTH INFORMATION MANAGEMENT | Facility: CLINIC | Age: 70
End: 2025-07-16
Payer: MEDICARE

## 2025-07-27 ENCOUNTER — HEALTH MAINTENANCE LETTER (OUTPATIENT)
Age: 70
End: 2025-07-27

## 2025-07-30 ENCOUNTER — PATIENT OUTREACH (OUTPATIENT)
Dept: CARE COORDINATION | Facility: CLINIC | Age: 70
End: 2025-07-30
Payer: MEDICARE

## 2025-07-30 ENCOUNTER — DOCUMENTATION ONLY (OUTPATIENT)
Dept: LAB | Facility: CLINIC | Age: 70
End: 2025-07-30
Payer: MEDICARE

## 2025-07-30 DIAGNOSIS — Z79.4 TYPE 2 DIABETES MELLITUS WITHOUT COMPLICATION, WITH LONG-TERM CURRENT USE OF INSULIN (H): Primary | ICD-10-CM

## 2025-07-30 DIAGNOSIS — E11.9 TYPE 2 DIABETES MELLITUS WITHOUT COMPLICATION, WITH LONG-TERM CURRENT USE OF INSULIN (H): Primary | ICD-10-CM

## 2025-07-30 NOTE — PROGRESS NOTES
Se Quinonez has an upcoming lab appointment:    Future Appointments   Date Time Provider Department Center   8/4/2025  2:15 PM [facility]     Patient is scheduled for the following lab(s): A1c    There is no order available. Please review and place either future orders or HMPO (Review of Health Maintenance Protocol Orders), as appropriate.    Health Maintenance Due   Topic    ANNUAL REVIEW OF HM ORDERS     LIPID     MICROALBUMIN     A1C     VISHNU Vaqsuez

## 2025-08-04 ENCOUNTER — LAB (OUTPATIENT)
Dept: LAB | Facility: CLINIC | Age: 70
End: 2025-08-04
Payer: MEDICARE

## 2025-08-04 DIAGNOSIS — E11.9 TYPE 2 DIABETES MELLITUS WITHOUT COMPLICATION, WITH LONG-TERM CURRENT USE OF INSULIN (H): ICD-10-CM

## 2025-08-04 DIAGNOSIS — Z79.4 TYPE 2 DIABETES MELLITUS WITHOUT COMPLICATION, WITH LONG-TERM CURRENT USE OF INSULIN (H): ICD-10-CM

## 2025-08-04 LAB
EST. AVERAGE GLUCOSE BLD GHB EST-MCNC: 97 MG/DL
HBA1C MFR BLD: 5 % (ref 0–5.6)

## 2025-08-04 PROCEDURE — 36415 COLL VENOUS BLD VENIPUNCTURE: CPT

## 2025-08-04 PROCEDURE — 83036 HEMOGLOBIN GLYCOSYLATED A1C: CPT

## 2025-08-11 DIAGNOSIS — T38.0X5A STEROID-INDUCED DIABETES MELLITUS, INITIAL ENCOUNTER: ICD-10-CM

## 2025-08-11 DIAGNOSIS — E09.9 STEROID-INDUCED DIABETES MELLITUS, INITIAL ENCOUNTER: ICD-10-CM

## 2025-08-11 RX ORDER — METFORMIN HYDROCHLORIDE 500 MG/1
500 TABLET, EXTENDED RELEASE ORAL
Qty: 90 TABLET | Refills: 0 | Status: SHIPPED | OUTPATIENT
Start: 2025-08-11

## 2025-08-13 ENCOUNTER — PATIENT OUTREACH (OUTPATIENT)
Dept: CARE COORDINATION | Facility: CLINIC | Age: 70
End: 2025-08-13
Payer: MEDICARE

## 2025-08-13 ENCOUNTER — TELEPHONE (OUTPATIENT)
Dept: UROLOGY | Facility: CLINIC | Age: 70
End: 2025-08-13
Payer: MEDICARE

## 2025-08-13 DIAGNOSIS — N32.81 OVERACTIVE BLADDER: ICD-10-CM

## 2025-08-17 DIAGNOSIS — E11.9 TYPE 2 DIABETES MELLITUS WITHOUT COMPLICATION, WITHOUT LONG-TERM CURRENT USE OF INSULIN (H): ICD-10-CM

## 2025-08-18 ENCOUNTER — OFFICE VISIT (OUTPATIENT)
Dept: FAMILY MEDICINE | Facility: CLINIC | Age: 70
End: 2025-08-18
Payer: MEDICARE

## 2025-08-18 ENCOUNTER — HOSPITAL ENCOUNTER (OUTPATIENT)
Dept: GENERAL RADIOLOGY | Facility: HOSPITAL | Age: 70
Discharge: HOME | End: 2025-08-18
Attending: FAMILY MEDICINE | Admitting: FAMILY MEDICINE
Payer: MEDICARE

## 2025-08-18 VITALS
OXYGEN SATURATION: 100 % | BODY MASS INDEX: 26.24 KG/M2 | HEART RATE: 81 BPM | HEIGHT: 67 IN | TEMPERATURE: 97.6 F | RESPIRATION RATE: 20 BRPM | WEIGHT: 167.2 LBS | SYSTOLIC BLOOD PRESSURE: 152 MMHG | DIASTOLIC BLOOD PRESSURE: 82 MMHG
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
## 2025-08-18 DIAGNOSIS — M79.661 PAIN OF RIGHT LOWER LEG: ICD-10-CM

## 2025-08-18 DIAGNOSIS — E11.9 TYPE 2 DIABETES MELLITUS WITHOUT COMPLICATION, WITHOUT LONG-TERM CURRENT USE OF INSULIN (H): ICD-10-CM
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
## 2025-08-18 PROCEDURE — 99214 OFFICE O/P EST MOD 30 MIN: CPT | Performed by: FAMILY MEDICINE

## 2025-08-18 PROCEDURE — 73610 X-RAY EXAM OF ANKLE: CPT | Mod: RT

## 2025-08-18 PROCEDURE — 3077F SYST BP >= 140 MM HG: CPT | Performed by: FAMILY MEDICINE

## 2025-08-18 PROCEDURE — 73590 X-RAY EXAM OF LOWER LEG: CPT | Mod: RT

## 2025-08-18 PROCEDURE — 3079F DIAST BP 80-89 MM HG: CPT | Performed by: FAMILY MEDICINE

## 2025-08-18 RX ORDER — LANCETS
EACH MISCELLANEOUS
Qty: 100 EACH | Refills: 0 | Status: SHIPPED | OUTPATIENT
Start: 2025-08-18

## 2025-08-19 RX ORDER — BLOOD SUGAR DIAGNOSTIC
STRIP MISCELLANEOUS DAILY
Qty: 100 STRIP | Refills: 0 | Status: SHIPPED | OUTPATIENT
Start: 2025-08-19

## 2025-08-20 ENCOUNTER — OFFICE VISIT (OUTPATIENT)
Dept: ORTHOPEDICS | Facility: CLINIC | Age: 70
End: 2025-08-20
Attending: FAMILY MEDICINE
Payer: MEDICARE

## 2025-08-20 VITALS
BODY MASS INDEX: 26.21 KG/M2 | OXYGEN SATURATION: 99 % | DIASTOLIC BLOOD PRESSURE: 85 MMHG | HEIGHT: 67 IN | WEIGHT: 167 LBS | SYSTOLIC BLOOD PRESSURE: 174 MMHG | HEART RATE: 90 BPM

## 2025-08-20 DIAGNOSIS — S82.831A OTHER CLOSED FRACTURE OF PROXIMAL END OF RIGHT FIBULA, INITIAL ENCOUNTER: Primary | ICD-10-CM

## 2025-08-20 DIAGNOSIS — Z87.311 HISTORY OF FRAGILITY FRACTURE: ICD-10-CM
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
## 2025-08-20 DIAGNOSIS — M85.80 OSTEOPENIA DETERMINED BY X-RAY: ICD-10-CM

## 2025-08-20 DIAGNOSIS — M79.661 PAIN OF RIGHT LOWER LEG: ICD-10-CM

## 2025-08-20 ASSESSMENT — PAIN SCALES - GENERAL: PAINLEVEL_OUTOF10: SEVERE PAIN (7)

## 2025-08-23 DIAGNOSIS — E11.9 TYPE 2 DIABETES MELLITUS WITHOUT COMPLICATION, WITHOUT LONG-TERM CURRENT USE OF INSULIN (H): ICD-10-CM

## 2025-08-25 RX ORDER — LANCETS
EACH MISCELLANEOUS
OUTPATIENT
Start: 2025-08-25

## 2025-08-25 RX ORDER — BLOOD SUGAR DIAGNOSTIC
STRIP MISCELLANEOUS DAILY
Qty: 100 STRIP | Refills: 0 | OUTPATIENT
Start: 2025-08-25

## 2025-08-27 ENCOUNTER — ANCILLARY PROCEDURE (OUTPATIENT)
Dept: BONE DENSITY | Facility: CLINIC | Age: 70
End: 2025-08-27
Attending: ORTHOPAEDIC SURGERY
Payer: MEDICARE

## 2025-08-27 DIAGNOSIS — M81.8 OTHER OSTEOPOROSIS WITHOUT CURRENT PATHOLOGICAL FRACTURE: ICD-10-CM

## 2025-08-27 DIAGNOSIS — G54.1 RADIATION-INDUCED LUMBOSACRAL PLEXOPATHY: ICD-10-CM

## 2025-08-27 PROCEDURE — 77080 DXA BONE DENSITY AXIAL: CPT | Mod: TC | Performed by: RADIOLOGY

## 2025-08-31 ENCOUNTER — MYC MEDICAL ADVICE (OUTPATIENT)
Dept: FAMILY MEDICINE | Facility: CLINIC | Age: 70
End: 2025-08-31
Payer: MEDICARE

## 2025-09-03 ENCOUNTER — OFFICE VISIT (OUTPATIENT)
Dept: FAMILY MEDICINE | Facility: CLINIC | Age: 70
End: 2025-09-03
Payer: MEDICARE

## 2025-09-03 VITALS
WEIGHT: 164.1 LBS | TEMPERATURE: 97.1 F | OXYGEN SATURATION: 99 % | SYSTOLIC BLOOD PRESSURE: 142 MMHG | HEIGHT: 66 IN | BODY MASS INDEX: 26.37 KG/M2 | DIASTOLIC BLOOD PRESSURE: 83 MMHG | RESPIRATION RATE: 16 BRPM | HEART RATE: 86 BPM

## 2025-09-03 DIAGNOSIS — Z79.4 TYPE 2 DIABETES MELLITUS WITHOUT COMPLICATION, WITH LONG-TERM CURRENT USE OF INSULIN (H): ICD-10-CM

## 2025-09-03 DIAGNOSIS — M85.80 OSTEOPENIA, UNSPECIFIED LOCATION: ICD-10-CM

## 2025-09-03 DIAGNOSIS — E11.9 TYPE 2 DIABETES MELLITUS WITHOUT COMPLICATION, WITH LONG-TERM CURRENT USE OF INSULIN (H): ICD-10-CM

## 2025-09-03 DIAGNOSIS — C61 MALIGNANT TUMOR OF PROSTATE (H): ICD-10-CM

## 2025-09-03 DIAGNOSIS — I10 BENIGN ESSENTIAL HYPERTENSION: ICD-10-CM

## 2025-09-03 DIAGNOSIS — Z23 NEED FOR VACCINATION: ICD-10-CM

## 2025-09-03 DIAGNOSIS — H26.9 CATARACT OF BOTH EYES, UNSPECIFIED CATARACT TYPE: ICD-10-CM

## 2025-09-03 DIAGNOSIS — B35.1 TOENAIL FUNGUS: ICD-10-CM

## 2025-09-03 DIAGNOSIS — Z01.818 PREOP GENERAL PHYSICAL EXAM: Primary | ICD-10-CM

## 2025-09-03 LAB
ANION GAP SERPL CALCULATED.3IONS-SCNC: 9 MMOL/L (ref 7–15)
BUN SERPL-MCNC: 15.5 MG/DL (ref 8–23)
CALCIUM SERPL-MCNC: 9.3 MG/DL (ref 8.8–10.4)
CHLORIDE SERPL-SCNC: 104 MMOL/L (ref 98–107)
CHOLEST SERPL-MCNC: 151 MG/DL
CREAT SERPL-MCNC: 0.79 MG/DL (ref 0.67–1.17)
CREAT UR-MCNC: 109.8 MG/DL
EGFRCR SERPLBLD CKD-EPI 2021: >90 ML/MIN/1.73M2
ERYTHROCYTE [DISTWIDTH] IN BLOOD BY AUTOMATED COUNT: 14.4 % (ref 10–15)
FASTING STATUS PATIENT QL REPORTED: YES
FASTING STATUS PATIENT QL REPORTED: YES
GLUCOSE SERPL-MCNC: 115 MG/DL (ref 70–99)
HCO3 SERPL-SCNC: 24 MMOL/L (ref 22–29)
HCT VFR BLD AUTO: 37 % (ref 40–53)
HDLC SERPL-MCNC: 59 MG/DL
HGB BLD-MCNC: 12.8 G/DL (ref 13.3–17.7)
LDLC SERPL CALC-MCNC: 71 MG/DL
MCH RBC QN AUTO: 37.1 PG (ref 26.5–33)
MCHC RBC AUTO-ENTMCNC: 34.6 G/DL (ref 31.5–36.5)
MCV RBC AUTO: 107.2 FL (ref 78–100)
MICROALBUMIN UR-MCNC: <12 MG/L
MICROALBUMIN/CREAT UR: NORMAL MG/G{CREAT}
NONHDLC SERPL-MCNC: 92 MG/DL
PLATELET # BLD AUTO: 209 10E3/UL (ref 150–450)
POTASSIUM SERPL-SCNC: 4 MMOL/L (ref 3.4–5.3)
RBC # BLD AUTO: 3.45 10E6/UL (ref 4.4–5.9)
SODIUM SERPL-SCNC: 137 MMOL/L (ref 135–145)
TRIGL SERPL-MCNC: 105 MG/DL
WBC # BLD AUTO: 6.62 10E3/UL (ref 4–11)

## 2025-09-03 PROCEDURE — 80048 BASIC METABOLIC PNL TOTAL CA: CPT | Performed by: NURSE PRACTITIONER

## 2025-09-03 PROCEDURE — 85027 COMPLETE CBC AUTOMATED: CPT | Performed by: NURSE PRACTITIONER

## 2025-09-03 PROCEDURE — 36415 COLL VENOUS BLD VENIPUNCTURE: CPT | Performed by: NURSE PRACTITIONER

## 2025-09-03 PROCEDURE — 3048F LDL-C <100 MG/DL: CPT | Performed by: NURSE PRACTITIONER

## 2025-09-03 PROCEDURE — 99214 OFFICE O/P EST MOD 30 MIN: CPT | Mod: 25 | Performed by: NURSE PRACTITIONER

## 2025-09-03 PROCEDURE — 90662 IIV NO PRSV INCREASED AG IM: CPT | Performed by: NURSE PRACTITIONER

## 2025-09-03 PROCEDURE — 82043 UR ALBUMIN QUANTITATIVE: CPT | Performed by: NURSE PRACTITIONER

## 2025-09-03 PROCEDURE — 3079F DIAST BP 80-89 MM HG: CPT | Performed by: NURSE PRACTITIONER

## 2025-09-03 PROCEDURE — 3077F SYST BP >= 140 MM HG: CPT | Performed by: NURSE PRACTITIONER

## 2025-09-03 PROCEDURE — 80061 LIPID PANEL: CPT | Performed by: NURSE PRACTITIONER

## 2025-09-03 PROCEDURE — 82570 ASSAY OF URINE CREATININE: CPT | Performed by: NURSE PRACTITIONER

## 2025-09-03 PROCEDURE — 1125F AMNT PAIN NOTED PAIN PRSNT: CPT | Performed by: NURSE PRACTITIONER

## 2025-09-03 PROCEDURE — G0008 ADMIN INFLUENZA VIRUS VAC: HCPCS | Performed by: NURSE PRACTITIONER

## 2025-09-03 PROCEDURE — 93000 ELECTROCARDIOGRAM COMPLETE: CPT | Performed by: NURSE PRACTITIONER

## 2025-09-03 RX ORDER — CICLOPIROX 80 MG/ML
SOLUTION TOPICAL
Qty: 6.6 ML | Refills: 11 | Status: SHIPPED | OUTPATIENT
Start: 2025-09-03

## 2025-09-03 RX ORDER — ALENDRONATE SODIUM 70 MG/1
70 TABLET ORAL
Qty: 12 TABLET | Refills: 3 | Status: SHIPPED | OUTPATIENT
Start: 2025-09-03

## 2025-09-03 ASSESSMENT — PAIN SCALES - GENERAL: PAINLEVEL_OUTOF10: MODERATE PAIN (5)
